# Patient Record
Sex: FEMALE | Race: WHITE | NOT HISPANIC OR LATINO | Employment: OTHER | URBAN - METROPOLITAN AREA
[De-identification: names, ages, dates, MRNs, and addresses within clinical notes are randomized per-mention and may not be internally consistent; named-entity substitution may affect disease eponyms.]

---

## 2017-04-21 ENCOUNTER — OUTSIDE FACILITY SERVICE (OUTPATIENT)
Dept: FAMILY MEDICINE CLINIC | Facility: CLINIC | Age: 82
End: 2017-04-21

## 2017-04-21 PROCEDURE — 99212 OFFICE O/P EST SF 10 MIN: CPT | Performed by: NURSE PRACTITIONER

## 2017-04-21 RX ORDER — DONEPEZIL HYDROCHLORIDE 10 MG/1
10 TABLET, FILM COATED ORAL DAILY
Qty: 30 TABLET | Refills: 11 | Status: SHIPPED | OUTPATIENT
Start: 2017-04-21 | End: 2017-10-07 | Stop reason: SDUPTHER

## 2017-04-29 ENCOUNTER — HOSPITAL ENCOUNTER (INPATIENT)
Facility: HOSPITAL | Age: 82
LOS: 6 days | Discharge: NURSING, REHAB OR LONG-TERM CARE | End: 2017-05-05
Attending: EMERGENCY MEDICINE | Admitting: INTERNAL MEDICINE

## 2017-04-29 ENCOUNTER — APPOINTMENT (OUTPATIENT)
Dept: GENERAL RADIOLOGY | Facility: HOSPITAL | Age: 82
End: 2017-04-29

## 2017-04-29 DIAGNOSIS — I50.21 ACUTE SYSTOLIC CONGESTIVE HEART FAILURE (HCC): ICD-10-CM

## 2017-04-29 DIAGNOSIS — R53.1 GENERALIZED WEAKNESS: ICD-10-CM

## 2017-04-29 DIAGNOSIS — R00.0 SINUS TACHYCARDIA: ICD-10-CM

## 2017-04-29 DIAGNOSIS — R06.09 DYSPNEA ON EXERTION: Primary | ICD-10-CM

## 2017-04-29 LAB
ALBUMIN SERPL-MCNC: 4 G/DL (ref 3.5–5.2)
ALBUMIN/GLOB SERPL: 1.1 G/DL
ALP SERPL-CCNC: 67 U/L (ref 39–117)
ALT SERPL W P-5'-P-CCNC: 37 U/L (ref 1–33)
ANION GAP SERPL CALCULATED.3IONS-SCNC: 17.8 MMOL/L
ANION GAP SERPL CALCULATED.3IONS-SCNC: 18.8 MMOL/L
ARTERIAL PATENCY WRIST A: ABNORMAL
AST SERPL-CCNC: 26 U/L (ref 1–32)
ATMOSPHERIC PRESS: 748.8 MMHG
BACTERIA UR QL AUTO: ABNORMAL /HPF
BASE EXCESS BLDA CALC-SCNC: -3.4 MMOL/L (ref 0–2)
BASOPHILS # BLD AUTO: 0.02 10*3/MM3 (ref 0–0.2)
BASOPHILS # BLD AUTO: 0.03 10*3/MM3 (ref 0–0.2)
BASOPHILS NFR BLD AUTO: 0.2 % (ref 0–1.5)
BASOPHILS NFR BLD AUTO: 0.3 % (ref 0–1.5)
BDY SITE: ABNORMAL
BILIRUB SERPL-MCNC: 1.1 MG/DL (ref 0.1–1.2)
BILIRUB UR QL STRIP: NEGATIVE
BUN BLD-MCNC: 25 MG/DL (ref 8–23)
BUN BLD-MCNC: 25 MG/DL (ref 8–23)
BUN/CREAT SERPL: 26.6 (ref 7–25)
BUN/CREAT SERPL: 26.9 (ref 7–25)
CALCIUM SPEC-SCNC: 9 MG/DL (ref 8.2–9.6)
CALCIUM SPEC-SCNC: 9.3 MG/DL (ref 8.2–9.6)
CHLORIDE SERPL-SCNC: 100 MMOL/L (ref 98–107)
CHLORIDE SERPL-SCNC: 98 MMOL/L (ref 98–107)
CLARITY UR: ABNORMAL
CO2 SERPL-SCNC: 22.2 MMOL/L (ref 22–29)
CO2 SERPL-SCNC: 23.2 MMOL/L (ref 22–29)
COLOR UR: YELLOW
CREAT BLD-MCNC: 0.93 MG/DL (ref 0.57–1)
CREAT BLD-MCNC: 0.94 MG/DL (ref 0.57–1)
D DIMER PPP FEU-MCNC: 0.94 MCGFEU/ML (ref 0–0.49)
DEPRECATED RDW RBC AUTO: 48.4 FL (ref 37–54)
DEPRECATED RDW RBC AUTO: 48.4 FL (ref 37–54)
EOSINOPHIL # BLD AUTO: 0.02 10*3/MM3 (ref 0–0.7)
EOSINOPHIL # BLD AUTO: 0.05 10*3/MM3 (ref 0–0.7)
EOSINOPHIL NFR BLD AUTO: 0.2 % (ref 0.3–6.2)
EOSINOPHIL NFR BLD AUTO: 0.5 % (ref 0.3–6.2)
ERYTHROCYTE [DISTWIDTH] IN BLOOD BY AUTOMATED COUNT: 15.1 % (ref 11.7–13)
ERYTHROCYTE [DISTWIDTH] IN BLOOD BY AUTOMATED COUNT: 15.1 % (ref 11.7–13)
GFR SERPL CREATININE-BSD FRML MDRD: 56 ML/MIN/1.73
GFR SERPL CREATININE-BSD FRML MDRD: 57 ML/MIN/1.73
GLOBULIN UR ELPH-MCNC: 3.5 GM/DL
GLUCOSE BLD-MCNC: 137 MG/DL (ref 65–99)
GLUCOSE BLD-MCNC: 144 MG/DL (ref 65–99)
GLUCOSE BLDC GLUCOMTR-MCNC: 168 MG/DL (ref 70–130)
GLUCOSE UR STRIP-MCNC: NEGATIVE MG/DL
HCO3 BLDA-SCNC: 20.4 MMOL/L (ref 22–28)
HCT VFR BLD AUTO: 37.9 % (ref 35.6–45.5)
HCT VFR BLD AUTO: 39.8 % (ref 35.6–45.5)
HGB BLD-MCNC: 12.4 G/DL (ref 11.9–15.5)
HGB BLD-MCNC: 13 G/DL (ref 11.9–15.5)
HGB UR QL STRIP.AUTO: ABNORMAL
HYALINE CASTS UR QL AUTO: ABNORMAL /LPF
IMM GRANULOCYTES # BLD: 0.03 10*3/MM3 (ref 0–0.03)
IMM GRANULOCYTES # BLD: 0.04 10*3/MM3 (ref 0–0.03)
IMM GRANULOCYTES NFR BLD: 0.3 % (ref 0–0.5)
IMM GRANULOCYTES NFR BLD: 0.4 % (ref 0–0.5)
INR PPP: 1.23 (ref 0.9–1.1)
KETONES UR QL STRIP: NEGATIVE
LEUKOCYTE ESTERASE UR QL STRIP.AUTO: ABNORMAL
LYMPHOCYTES # BLD AUTO: 1.24 10*3/MM3 (ref 0.9–4.8)
LYMPHOCYTES # BLD AUTO: 1.4 10*3/MM3 (ref 0.9–4.8)
LYMPHOCYTES NFR BLD AUTO: 11.6 % (ref 19.6–45.3)
LYMPHOCYTES NFR BLD AUTO: 13.8 % (ref 19.6–45.3)
MAGNESIUM SERPL-MCNC: 2.2 MG/DL (ref 1.7–2.3)
MCH RBC QN AUTO: 28.9 PG (ref 26.9–32)
MCH RBC QN AUTO: 29.1 PG (ref 26.9–32)
MCHC RBC AUTO-ENTMCNC: 32.7 G/DL (ref 32.4–36.3)
MCHC RBC AUTO-ENTMCNC: 32.7 G/DL (ref 32.4–36.3)
MCV RBC AUTO: 88.3 FL (ref 80.5–98.2)
MCV RBC AUTO: 89 FL (ref 80.5–98.2)
MODALITY: ABNORMAL
MONOCYTES # BLD AUTO: 0.76 10*3/MM3 (ref 0.2–1.2)
MONOCYTES # BLD AUTO: 0.85 10*3/MM3 (ref 0.2–1.2)
MONOCYTES NFR BLD AUTO: 7.1 % (ref 5–12)
MONOCYTES NFR BLD AUTO: 8.4 % (ref 5–12)
NEUTROPHILS # BLD AUTO: 7.75 10*3/MM3 (ref 1.9–8.1)
NEUTROPHILS # BLD AUTO: 8.65 10*3/MM3 (ref 1.9–8.1)
NEUTROPHILS NFR BLD AUTO: 76.7 % (ref 42.7–76)
NEUTROPHILS NFR BLD AUTO: 80.5 % (ref 42.7–76)
NITRITE UR QL STRIP: NEGATIVE
NT-PROBNP SERPL-MCNC: 8177 PG/ML (ref 0–1800)
PCO2 BLDA: 32.3 MM HG (ref 35–45)
PH BLDA: 7.41 PH UNITS (ref 7.35–7.45)
PH UR STRIP.AUTO: 6 [PH] (ref 5–8)
PLATELET # BLD AUTO: 280 10*3/MM3 (ref 140–500)
PLATELET # BLD AUTO: 296 10*3/MM3 (ref 140–500)
PMV BLD AUTO: 9.7 FL (ref 6–12)
PMV BLD AUTO: 9.8 FL (ref 6–12)
PO2 BLDA: 67.8 MM HG (ref 80–100)
POTASSIUM BLD-SCNC: 3.8 MMOL/L (ref 3.5–5.2)
POTASSIUM BLD-SCNC: 4.1 MMOL/L (ref 3.5–5.2)
PROT SERPL-MCNC: 7.5 G/DL (ref 6–8.5)
PROT UR QL STRIP: NEGATIVE
PROTHROMBIN TIME: 15 SECONDS (ref 11.7–14.2)
RBC # BLD AUTO: 4.29 10*6/MM3 (ref 3.9–5.2)
RBC # BLD AUTO: 4.47 10*6/MM3 (ref 3.9–5.2)
RBC # UR: ABNORMAL /HPF
REF LAB TEST METHOD: ABNORMAL
SAO2 % BLDCOA: 93.6 % (ref 92–99)
SODIUM BLD-SCNC: 138 MMOL/L (ref 136–145)
SODIUM BLD-SCNC: 142 MMOL/L (ref 136–145)
SP GR UR STRIP: 1.01 (ref 1–1.03)
SQUAMOUS #/AREA URNS HPF: ABNORMAL /HPF
T4 FREE SERPL-MCNC: 1.35 NG/DL (ref 0.93–1.7)
TOTAL RATE: 22 BREATHS/MINUTE
TROPONIN T SERPL-MCNC: <0.01 NG/ML (ref 0–0.03)
TSH SERPL DL<=0.05 MIU/L-ACNC: 3.76 MIU/ML (ref 0.27–4.2)
UROBILINOGEN UR QL STRIP: ABNORMAL
WBC NRBC COR # BLD: 10.11 10*3/MM3 (ref 4.5–10.7)
WBC NRBC COR # BLD: 10.73 10*3/MM3 (ref 4.5–10.7)
WBC UR QL AUTO: ABNORMAL /HPF

## 2017-04-29 PROCEDURE — 94799 UNLISTED PULMONARY SVC/PX: CPT

## 2017-04-29 PROCEDURE — 85025 COMPLETE CBC W/AUTO DIFF WBC: CPT | Performed by: EMERGENCY MEDICINE

## 2017-04-29 PROCEDURE — 94660 CPAP INITIATION&MGMT: CPT

## 2017-04-29 PROCEDURE — 99222 1ST HOSP IP/OBS MODERATE 55: CPT | Performed by: INTERNAL MEDICINE

## 2017-04-29 PROCEDURE — 84484 ASSAY OF TROPONIN QUANT: CPT | Performed by: EMERGENCY MEDICINE

## 2017-04-29 PROCEDURE — 84443 ASSAY THYROID STIM HORMONE: CPT | Performed by: EMERGENCY MEDICINE

## 2017-04-29 PROCEDURE — 80053 COMPREHEN METABOLIC PANEL: CPT | Performed by: EMERGENCY MEDICINE

## 2017-04-29 PROCEDURE — 93005 ELECTROCARDIOGRAM TRACING: CPT | Performed by: INTERNAL MEDICINE

## 2017-04-29 PROCEDURE — 82962 GLUCOSE BLOOD TEST: CPT

## 2017-04-29 PROCEDURE — 5A09357 ASSISTANCE WITH RESPIRATORY VENTILATION, LESS THAN 24 CONSECUTIVE HOURS, CONTINUOUS POSITIVE AIRWAY PRESSURE: ICD-10-PCS | Performed by: INTERNAL MEDICINE

## 2017-04-29 PROCEDURE — 93005 ELECTROCARDIOGRAM TRACING: CPT | Performed by: EMERGENCY MEDICINE

## 2017-04-29 PROCEDURE — 93010 ELECTROCARDIOGRAM REPORT: CPT | Performed by: INTERNAL MEDICINE

## 2017-04-29 PROCEDURE — 71010 HC CHEST PA OR AP: CPT

## 2017-04-29 PROCEDURE — 36600 WITHDRAWAL OF ARTERIAL BLOOD: CPT

## 2017-04-29 PROCEDURE — 85610 PROTHROMBIN TIME: CPT | Performed by: EMERGENCY MEDICINE

## 2017-04-29 PROCEDURE — 25010000002 FUROSEMIDE PER 20 MG: Performed by: EMERGENCY MEDICINE

## 2017-04-29 PROCEDURE — 81001 URINALYSIS AUTO W/SCOPE: CPT | Performed by: INTERNAL MEDICINE

## 2017-04-29 PROCEDURE — 83880 ASSAY OF NATRIURETIC PEPTIDE: CPT | Performed by: EMERGENCY MEDICINE

## 2017-04-29 PROCEDURE — 85379 FIBRIN DEGRADATION QUANT: CPT | Performed by: EMERGENCY MEDICINE

## 2017-04-29 PROCEDURE — 85025 COMPLETE CBC W/AUTO DIFF WBC: CPT | Performed by: INTERNAL MEDICINE

## 2017-04-29 PROCEDURE — 99284 EMERGENCY DEPT VISIT MOD MDM: CPT

## 2017-04-29 PROCEDURE — 82803 BLOOD GASES ANY COMBINATION: CPT

## 2017-04-29 PROCEDURE — 84439 ASSAY OF FREE THYROXINE: CPT | Performed by: EMERGENCY MEDICINE

## 2017-04-29 PROCEDURE — 83735 ASSAY OF MAGNESIUM: CPT | Performed by: EMERGENCY MEDICINE

## 2017-04-29 RX ORDER — SODIUM CHLORIDE 0.9 % (FLUSH) 0.9 %
10 SYRINGE (ML) INJECTION AS NEEDED
Status: DISCONTINUED | OUTPATIENT
Start: 2017-04-29 | End: 2017-05-05 | Stop reason: HOSPADM

## 2017-04-29 RX ORDER — ACETAMINOPHEN 650 MG/1
650 SUPPOSITORY RECTAL EVERY 4 HOURS PRN
Status: DISCONTINUED | OUTPATIENT
Start: 2017-04-29 | End: 2017-05-05 | Stop reason: HOSPADM

## 2017-04-29 RX ORDER — IPRATROPIUM BROMIDE AND ALBUTEROL SULFATE 2.5; .5 MG/3ML; MG/3ML
3 SOLUTION RESPIRATORY (INHALATION) EVERY 6 HOURS PRN
Status: DISCONTINUED | OUTPATIENT
Start: 2017-04-29 | End: 2017-05-03

## 2017-04-29 RX ORDER — FUROSEMIDE 10 MG/ML
80 INJECTION INTRAMUSCULAR; INTRAVENOUS ONCE
Status: COMPLETED | OUTPATIENT
Start: 2017-04-29 | End: 2017-04-29

## 2017-04-29 RX ORDER — BISACODYL 10 MG
10 SUPPOSITORY, RECTAL RECTAL DAILY PRN
Status: DISCONTINUED | OUTPATIENT
Start: 2017-04-29 | End: 2017-05-05 | Stop reason: HOSPADM

## 2017-04-29 RX ORDER — ACETAMINOPHEN 325 MG/1
650 TABLET ORAL EVERY 4 HOURS PRN
Status: DISCONTINUED | OUTPATIENT
Start: 2017-04-29 | End: 2017-05-05 | Stop reason: HOSPADM

## 2017-04-29 RX ORDER — METOPROLOL SUCCINATE 100 MG/1
100 TABLET, EXTENDED RELEASE ORAL DAILY
COMMUNITY
End: 2017-05-05 | Stop reason: HOSPADM

## 2017-04-29 RX ORDER — NITROGLYCERIN 0.4 MG/1
0.4 TABLET SUBLINGUAL
Status: DISCONTINUED | OUTPATIENT
Start: 2017-04-29 | End: 2017-05-05 | Stop reason: HOSPADM

## 2017-04-29 RX ORDER — DONEPEZIL HYDROCHLORIDE 10 MG/1
10 TABLET, FILM COATED ORAL DAILY
Status: DISCONTINUED | OUTPATIENT
Start: 2017-04-30 | End: 2017-05-05 | Stop reason: HOSPADM

## 2017-04-29 RX ORDER — ENALAPRIL MALEATE 20 MG/1
20 TABLET ORAL
Status: DISCONTINUED | OUTPATIENT
Start: 2017-04-30 | End: 2017-05-05 | Stop reason: HOSPADM

## 2017-04-29 RX ORDER — ASPIRIN 81 MG/1
81 TABLET ORAL DAILY
Status: DISCONTINUED | OUTPATIENT
Start: 2017-04-30 | End: 2017-05-05 | Stop reason: HOSPADM

## 2017-04-29 RX ORDER — BISACODYL 5 MG/1
5 TABLET, DELAYED RELEASE ORAL DAILY PRN
Status: DISCONTINUED | OUTPATIENT
Start: 2017-04-29 | End: 2017-05-05 | Stop reason: HOSPADM

## 2017-04-29 RX ORDER — SPIRONOLACTONE 25 MG/1
25 TABLET ORAL DAILY
Status: DISCONTINUED | OUTPATIENT
Start: 2017-04-30 | End: 2017-05-05 | Stop reason: HOSPADM

## 2017-04-29 RX ORDER — DOCUSATE SODIUM 100 MG/1
100 CAPSULE, LIQUID FILLED ORAL 2 TIMES DAILY
Status: DISCONTINUED | OUTPATIENT
Start: 2017-04-29 | End: 2017-05-05 | Stop reason: HOSPADM

## 2017-04-29 RX ORDER — PROMETHAZINE HYDROCHLORIDE 25 MG/1
12.5 TABLET ORAL EVERY 6 HOURS PRN
Status: DISCONTINUED | OUTPATIENT
Start: 2017-04-29 | End: 2017-05-05 | Stop reason: HOSPADM

## 2017-04-29 RX ORDER — HYDROCHLOROTHIAZIDE 12.5 MG/1
12.5 CAPSULE, GELATIN COATED ORAL DAILY
Status: DISCONTINUED | OUTPATIENT
Start: 2017-04-30 | End: 2017-04-30

## 2017-04-29 RX ORDER — SODIUM CHLORIDE 0.9 % (FLUSH) 0.9 %
1-10 SYRINGE (ML) INJECTION AS NEEDED
Status: DISCONTINUED | OUTPATIENT
Start: 2017-04-29 | End: 2017-05-05 | Stop reason: HOSPADM

## 2017-04-29 RX ORDER — METOPROLOL SUCCINATE 100 MG/1
100 TABLET, EXTENDED RELEASE ORAL DAILY
Status: DISCONTINUED | OUTPATIENT
Start: 2017-04-30 | End: 2017-05-02

## 2017-04-29 RX ORDER — MAGNESIUM HYDROXIDE/ALUMINUM HYDROXICE/SIMETHICONE 120; 1200; 1200 MG/30ML; MG/30ML; MG/30ML
30 SUSPENSION ORAL EVERY 6 HOURS PRN
Status: DISCONTINUED | OUTPATIENT
Start: 2017-04-29 | End: 2017-05-05 | Stop reason: HOSPADM

## 2017-04-29 RX ORDER — ONDANSETRON 4 MG/1
4 TABLET, ORALLY DISINTEGRATING ORAL EVERY 6 HOURS PRN
Status: DISCONTINUED | OUTPATIENT
Start: 2017-04-29 | End: 2017-05-05 | Stop reason: HOSPADM

## 2017-04-29 RX ORDER — ONDANSETRON 4 MG/1
4 TABLET, FILM COATED ORAL EVERY 6 HOURS PRN
Status: DISCONTINUED | OUTPATIENT
Start: 2017-04-29 | End: 2017-05-05 | Stop reason: HOSPADM

## 2017-04-29 RX ORDER — CALCIUM CARBONATE 200(500)MG
2 TABLET,CHEWABLE ORAL 2 TIMES DAILY PRN
Status: DISCONTINUED | OUTPATIENT
Start: 2017-04-29 | End: 2017-05-05 | Stop reason: HOSPADM

## 2017-04-29 RX ORDER — ONDANSETRON 2 MG/ML
4 INJECTION INTRAMUSCULAR; INTRAVENOUS EVERY 6 HOURS PRN
Status: DISCONTINUED | OUTPATIENT
Start: 2017-04-29 | End: 2017-05-05 | Stop reason: HOSPADM

## 2017-04-29 RX ORDER — HYDROCODONE BITARTRATE AND ACETAMINOPHEN 5; 325 MG/1; MG/1
1 TABLET ORAL EVERY 4 HOURS PRN
Status: DISCONTINUED | OUTPATIENT
Start: 2017-04-29 | End: 2017-05-05 | Stop reason: HOSPADM

## 2017-04-29 RX ORDER — FUROSEMIDE 10 MG/ML
40 INJECTION INTRAMUSCULAR; INTRAVENOUS EVERY 12 HOURS
Status: DISCONTINUED | OUTPATIENT
Start: 2017-04-29 | End: 2017-04-30

## 2017-04-29 RX ADMIN — FUROSEMIDE 80 MG: 10 INJECTION, SOLUTION INTRAMUSCULAR; INTRAVENOUS at 16:31

## 2017-04-29 RX ADMIN — IVABRADINE 7.5 MG: 5 TABLET, FILM COATED ORAL at 22:48

## 2017-04-29 RX ADMIN — METOROPROLOL TARTRATE 5 MG: 5 INJECTION, SOLUTION INTRAVENOUS at 15:51

## 2017-04-29 RX ADMIN — NITROGLYCERIN 0.5 INCH: 20 OINTMENT TOPICAL at 16:55

## 2017-04-30 ENCOUNTER — APPOINTMENT (OUTPATIENT)
Dept: GENERAL RADIOLOGY | Facility: HOSPITAL | Age: 82
End: 2017-04-30

## 2017-04-30 ENCOUNTER — APPOINTMENT (OUTPATIENT)
Dept: CARDIOLOGY | Facility: HOSPITAL | Age: 82
End: 2017-04-30
Attending: INTERNAL MEDICINE

## 2017-04-30 PROBLEM — J81.0 ACUTE PULMONARY EDEMA (HCC): Status: ACTIVE | Noted: 2017-04-30

## 2017-04-30 PROBLEM — K62.9 RECTAL DISORDER: Status: ACTIVE | Noted: 2017-04-30

## 2017-04-30 PROBLEM — I42.7 DILATED CARDIOMYOPATHY SECONDARY TO DRUG (HCC): Status: ACTIVE | Noted: 2017-04-30

## 2017-04-30 PROBLEM — R41.3 MEMORY LOSS: Status: ACTIVE | Noted: 2017-04-30

## 2017-04-30 PROBLEM — C83.50: Status: ACTIVE | Noted: 2017-04-30

## 2017-04-30 PROBLEM — R60.0 PEDAL EDEMA: Status: ACTIVE | Noted: 2017-04-30

## 2017-04-30 PROBLEM — M48.54XA COLLAPSED VERTEBRA, NOT ELSEWHERE CLASSIFIED, THORACIC REGION, INITIAL ENCOUNTER FOR FRACTURE (HCC): Status: ACTIVE | Noted: 2017-04-30

## 2017-04-30 PROBLEM — J90 PLEURAL EFFUSION ON RIGHT: Status: ACTIVE | Noted: 2017-04-30

## 2017-04-30 PROBLEM — G47.33 OBSTRUCTIVE SLEEP APNEA SYNDROME: Status: ACTIVE | Noted: 2017-04-30

## 2017-04-30 PROBLEM — I47.1 PAROXYSMAL SUPRAVENTRICULAR TACHYCARDIA (HCC): Status: ACTIVE | Noted: 2017-04-30

## 2017-04-30 PROBLEM — I50.21 ACUTE SYSTOLIC (CONGESTIVE) HEART FAILURE (HCC): Status: ACTIVE | Noted: 2017-04-30

## 2017-04-30 PROBLEM — I10 ESSENTIAL HYPERTENSION: Status: ACTIVE | Noted: 2017-04-30

## 2017-04-30 PROBLEM — K57.90 DIVERTICULOSIS OF INTESTINE: Status: ACTIVE | Noted: 2017-04-30

## 2017-04-30 PROBLEM — I34.0 MITRAL VALVE INSUFFICIENCY: Status: ACTIVE | Noted: 2017-04-30

## 2017-04-30 PROBLEM — M81.0 OSTEOPOROSIS: Status: ACTIVE | Noted: 2017-04-30

## 2017-04-30 PROBLEM — I51.89 DIASTOLIC DYSFUNCTION: Status: ACTIVE | Noted: 2017-04-30

## 2017-04-30 PROBLEM — I50.42 CHRONIC COMBINED SYSTOLIC AND DIASTOLIC HEART FAILURE (HCC): Status: ACTIVE | Noted: 2017-04-30

## 2017-04-30 PROBLEM — J96.01 ACUTE RESPIRATORY FAILURE WITH HYPOXEMIA (HCC): Status: ACTIVE | Noted: 2017-04-30

## 2017-04-30 PROBLEM — D25.9 UTERINE LEIOMYOMA: Status: ACTIVE | Noted: 2017-04-30

## 2017-04-30 LAB
AMYLASE SERPL-CCNC: 15 U/L (ref 28–100)
ANION GAP SERPL CALCULATED.3IONS-SCNC: 19.6 MMOL/L
AORTIC ARCH: 1.9 CM
ASCENDING AORTA: 2.7 CM
BASOPHILS # BLD AUTO: 0.04 10*3/MM3 (ref 0–0.2)
BASOPHILS NFR BLD AUTO: 0.4 % (ref 0–1.5)
BH CV ECHO MEAS - ACS: 0.8 CM
BH CV ECHO MEAS - AI DEC SLOPE: 183 CM/SEC^2
BH CV ECHO MEAS - AI MAX PG: 10.6 MMHG
BH CV ECHO MEAS - AI MAX VEL: 163 CM/SEC
BH CV ECHO MEAS - AI P1/2T: 260.9 MSEC
BH CV ECHO MEAS - AO MAX PG (FULL): 4 MMHG
BH CV ECHO MEAS - AO MAX PG: 6.6 MMHG
BH CV ECHO MEAS - AO MEAN PG (FULL): 3 MMHG
BH CV ECHO MEAS - AO MEAN PG: 4 MMHG
BH CV ECHO MEAS - AO ROOT AREA (BSA CORRECTED): 1.9
BH CV ECHO MEAS - AO ROOT AREA: 7.1 CM^2
BH CV ECHO MEAS - AO ROOT DIAM: 3 CM
BH CV ECHO MEAS - AO V2 MAX: 128 CM/SEC
BH CV ECHO MEAS - AO V2 MEAN: 93 CM/SEC
BH CV ECHO MEAS - AO V2 VTI: 22 CM
BH CV ECHO MEAS - ASC AORTA: 2.7 CM
BH CV ECHO MEAS - AVA(I,A): 1.5 CM^2
BH CV ECHO MEAS - AVA(I,D): 1.5 CM^2
BH CV ECHO MEAS - AVA(V,A): 1.6 CM^2
BH CV ECHO MEAS - AVA(V,D): 1.6 CM^2
BH CV ECHO MEAS - BSA(HAYCOCK): 1.6 M^2
BH CV ECHO MEAS - BSA: 1.6 M^2
BH CV ECHO MEAS - BZI_BMI: 25.5 KILOGRAMS/M^2
BH CV ECHO MEAS - BZI_METRIC_HEIGHT: 154.9 CM
BH CV ECHO MEAS - BZI_METRIC_WEIGHT: 61.2 KG
BH CV ECHO MEAS - CONTRAST EF (2CH): 22.4 ML/M^2
BH CV ECHO MEAS - CONTRAST EF 4CH: 27.2 ML/M^2
BH CV ECHO MEAS - EDV(CUBED): 132.7 ML
BH CV ECHO MEAS - EDV(MOD-SP2): 116 ML
BH CV ECHO MEAS - EDV(MOD-SP4): 136 ML
BH CV ECHO MEAS - EDV(TEICH): 123.8 ML
BH CV ECHO MEAS - EF(CUBED): 31.3 %
BH CV ECHO MEAS - EF(MOD-SP2): 22.4 %
BH CV ECHO MEAS - EF(MOD-SP4): 27.2 %
BH CV ECHO MEAS - EF(TEICH): 25.3 %
BH CV ECHO MEAS - ESV(CUBED): 91.1 ML
BH CV ECHO MEAS - ESV(MOD-SP2): 90 ML
BH CV ECHO MEAS - ESV(MOD-SP4): 99 ML
BH CV ECHO MEAS - ESV(TEICH): 92.4 ML
BH CV ECHO MEAS - FS: 11.8 %
BH CV ECHO MEAS - IVS/LVPW: 0.6
BH CV ECHO MEAS - IVSD: 0.6 CM
BH CV ECHO MEAS - LAT PEAK E' VEL: 10 CM/SEC
BH CV ECHO MEAS - LV DIASTOLIC VOL/BSA (35-75): 85.1 ML/M^2
BH CV ECHO MEAS - LV MASS(C)D: 140.5 GRAMS
BH CV ECHO MEAS - LV MASS(C)DI: 87.9 GRAMS/M^2
BH CV ECHO MEAS - LV MAX PG: 2.5 MMHG
BH CV ECHO MEAS - LV MEAN PG: 1 MMHG
BH CV ECHO MEAS - LV SYSTOLIC VOL/BSA (12-30): 61.9 ML/M^2
BH CV ECHO MEAS - LV V1 MAX: 79.8 CM/SEC
BH CV ECHO MEAS - LV V1 MEAN: 51.1 CM/SEC
BH CV ECHO MEAS - LV V1 VTI: 13.2 CM
BH CV ECHO MEAS - LVIDD: 5.1 CM
BH CV ECHO MEAS - LVIDS: 4.5 CM
BH CV ECHO MEAS - LVLD AP2: 6.6 CM
BH CV ECHO MEAS - LVLD AP4: 8.3 CM
BH CV ECHO MEAS - LVLS AP2: 6.4 CM
BH CV ECHO MEAS - LVLS AP4: 7.5 CM
BH CV ECHO MEAS - LVOT AREA (M): 2.5 CM^2
BH CV ECHO MEAS - LVOT AREA: 2.5 CM^2
BH CV ECHO MEAS - LVOT DIAM: 1.8 CM
BH CV ECHO MEAS - LVPWD: 1 CM
BH CV ECHO MEAS - MED PEAK E' VEL: 6 CM/SEC
BH CV ECHO MEAS - MR MAX PG: 84.3 MMHG
BH CV ECHO MEAS - MR MAX VEL: 459 CM/SEC
BH CV ECHO MEAS - MV DEC SLOPE: 1187 CM/SEC^2
BH CV ECHO MEAS - MV DEC TIME: 0.13 SEC
BH CV ECHO MEAS - MV E MAX VEL: 134 CM/SEC
BH CV ECHO MEAS - MV MAX PG: 9.2 MMHG
BH CV ECHO MEAS - MV MEAN PG: 3 MMHG
BH CV ECHO MEAS - MV P1/2T MAX VEL: 161 CM/SEC
BH CV ECHO MEAS - MV P1/2T: 39.7 MSEC
BH CV ECHO MEAS - MV V2 MAX: 152 CM/SEC
BH CV ECHO MEAS - MV V2 MEAN: 74.1 CM/SEC
BH CV ECHO MEAS - MV V2 VTI: 19.5 CM
BH CV ECHO MEAS - MVA P1/2T LCG: 1.4 CM^2
BH CV ECHO MEAS - MVA(P1/2T): 5.5 CM^2
BH CV ECHO MEAS - MVA(VTI): 1.7 CM^2
BH CV ECHO MEAS - PA ACC TIME: 0.05 SEC
BH CV ECHO MEAS - PA MAX PG (FULL): 0.95 MMHG
BH CV ECHO MEAS - PA MAX PG: 1.8 MMHG
BH CV ECHO MEAS - PA PR(ACCEL): 55.2 MMHG
BH CV ECHO MEAS - PA V2 MAX: 67.9 CM/SEC
BH CV ECHO MEAS - PI END-D VEL: 82.8 CM/SEC
BH CV ECHO MEAS - PULM DIAS VEL: 74.7 CM/SEC
BH CV ECHO MEAS - PULM S/D: 0.61
BH CV ECHO MEAS - PULM SYS VEL: 45.6 CM/SEC
BH CV ECHO MEAS - PVA(V,A): 3.7 CM^2
BH CV ECHO MEAS - PVA(V,D): 3.7 CM^2
BH CV ECHO MEAS - QP/QS: 0.98
BH CV ECHO MEAS - RAP SYSTOLE: 15 MMHG
BH CV ECHO MEAS - RV MAX PG: 0.89 MMHG
BH CV ECHO MEAS - RV MEAN PG: 1 MMHG
BH CV ECHO MEAS - RV V1 MAX: 47.3 CM/SEC
BH CV ECHO MEAS - RV V1 MEAN: 33.6 CM/SEC
BH CV ECHO MEAS - RV V1 VTI: 6.2 CM
BH CV ECHO MEAS - RVOT AREA: 5.3 CM^2
BH CV ECHO MEAS - RVOT DIAM: 2.6 CM
BH CV ECHO MEAS - RVSP: 43 MMHG
BH CV ECHO MEAS - SI(AO): 97.3 ML/M^2
BH CV ECHO MEAS - SI(CUBED): 26 ML/M^2
BH CV ECHO MEAS - SI(LVOT): 21 ML/M^2
BH CV ECHO MEAS - SI(MOD-SP2): 16.3 ML/M^2
BH CV ECHO MEAS - SI(MOD-SP4): 23.1 ML/M^2
BH CV ECHO MEAS - SI(TEICH): 19.6 ML/M^2
BH CV ECHO MEAS - SUP REN AO DIAM: 1.5 CM
BH CV ECHO MEAS - SV(AO): 155.5 ML
BH CV ECHO MEAS - SV(CUBED): 41.5 ML
BH CV ECHO MEAS - SV(LVOT): 33.6 ML
BH CV ECHO MEAS - SV(MOD-SP2): 26 ML
BH CV ECHO MEAS - SV(MOD-SP4): 37 ML
BH CV ECHO MEAS - SV(RVOT): 33 ML
BH CV ECHO MEAS - SV(TEICH): 31.4 ML
BH CV ECHO MEAS - TAPSE (>1.6): 1.5 CM2
BH CV ECHO MEAS - TR MAX VEL: 266 CM/SEC
BH CV XLRA - RV BASE: 3.5 CM
BH CV XLRA - TDI S': 11 CM/SEC
BUN BLD-MCNC: 28 MG/DL (ref 8–23)
BUN/CREAT SERPL: 29.5 (ref 7–25)
CALCIUM SPEC-SCNC: 8.9 MG/DL (ref 8.2–9.6)
CHLORIDE SERPL-SCNC: 101 MMOL/L (ref 98–107)
CHOLEST SERPL-MCNC: 155 MG/DL (ref 0–200)
CK MB SERPL-CCNC: 3.9 NG/ML
CK SERPL-CCNC: 57 U/L (ref 20–180)
CO2 SERPL-SCNC: 21.4 MMOL/L (ref 22–29)
CREAT BLD-MCNC: 0.95 MG/DL (ref 0.57–1)
D DIMER PPP FEU-MCNC: 0.99 MCGFEU/ML (ref 0–0.49)
DEPRECATED RDW RBC AUTO: 48.1 FL (ref 37–54)
E/E' RATIO: 19
EOSINOPHIL # BLD AUTO: 0.1 10*3/MM3 (ref 0–0.7)
EOSINOPHIL NFR BLD AUTO: 0.9 % (ref 0.3–6.2)
ERYTHROCYTE [DISTWIDTH] IN BLOOD BY AUTOMATED COUNT: 15.1 % (ref 11.7–13)
GFR SERPL CREATININE-BSD FRML MDRD: 55 ML/MIN/1.73
GLUCOSE BLD-MCNC: 125 MG/DL (ref 65–99)
GLUCOSE BLDC GLUCOMTR-MCNC: 124 MG/DL (ref 70–130)
GLUCOSE BLDC GLUCOMTR-MCNC: 164 MG/DL (ref 70–130)
GLUCOSE BLDC GLUCOMTR-MCNC: 177 MG/DL (ref 70–130)
GLUCOSE BLDC GLUCOMTR-MCNC: 184 MG/DL (ref 70–130)
HCT VFR BLD AUTO: 37.8 % (ref 35.6–45.5)
HDLC SERPL-MCNC: 22 MG/DL (ref 40–60)
HGB BLD-MCNC: 12.2 G/DL (ref 11.9–15.5)
IMM GRANULOCYTES # BLD: 0.03 10*3/MM3 (ref 0–0.03)
IMM GRANULOCYTES NFR BLD: 0.3 % (ref 0–0.5)
LDLC SERPL CALC-MCNC: 106 MG/DL (ref 0–100)
LDLC/HDLC SERPL: 4.83 {RATIO}
LEFT ATRIUM VOLUME INDEX: 41 ML/M2
LIPASE SERPL-CCNC: 30 U/L (ref 13–60)
LV EF 2D ECHO EST: 22 %
LYMPHOCYTES # BLD AUTO: 1.79 10*3/MM3 (ref 0.9–4.8)
LYMPHOCYTES NFR BLD AUTO: 17 % (ref 19.6–45.3)
MAGNESIUM SERPL-MCNC: 2.1 MG/DL (ref 1.7–2.3)
MCH RBC QN AUTO: 29 PG (ref 26.9–32)
MCHC RBC AUTO-ENTMCNC: 32.3 G/DL (ref 32.4–36.3)
MCV RBC AUTO: 90 FL (ref 80.5–98.2)
MONOCYTES # BLD AUTO: 1.04 10*3/MM3 (ref 0.2–1.2)
MONOCYTES NFR BLD AUTO: 9.9 % (ref 5–12)
NEUTROPHILS # BLD AUTO: 7.54 10*3/MM3 (ref 1.9–8.1)
NEUTROPHILS NFR BLD AUTO: 71.5 % (ref 42.7–76)
NT-PROBNP SERPL-MCNC: ABNORMAL PG/ML (ref 0–1800)
PHOSPHATE SERPL-MCNC: 4.6 MG/DL (ref 2.5–4.5)
PLATELET # BLD AUTO: 253 10*3/MM3 (ref 140–500)
PMV BLD AUTO: 10.1 FL (ref 6–12)
POTASSIUM BLD-SCNC: 4 MMOL/L (ref 3.5–5.2)
RBC # BLD AUTO: 4.2 10*6/MM3 (ref 3.9–5.2)
SODIUM BLD-SCNC: 142 MMOL/L (ref 136–145)
TRIGL SERPL-MCNC: 134 MG/DL (ref 0–150)
TROPONIN T SERPL-MCNC: <0.01 NG/ML (ref 0–0.03)
TSH SERPL DL<=0.05 MIU/L-ACNC: 3.33 MIU/ML (ref 0.27–4.2)
VLDLC SERPL-MCNC: 26.8 MG/DL (ref 5–40)
WBC NRBC COR # BLD: 10.54 10*3/MM3 (ref 4.5–10.7)

## 2017-04-30 PROCEDURE — 84443 ASSAY THYROID STIM HORMONE: CPT | Performed by: INTERNAL MEDICINE

## 2017-04-30 PROCEDURE — 93010 ELECTROCARDIOGRAM REPORT: CPT | Performed by: INTERNAL MEDICINE

## 2017-04-30 PROCEDURE — 80048 BASIC METABOLIC PNL TOTAL CA: CPT | Performed by: INTERNAL MEDICINE

## 2017-04-30 PROCEDURE — 80061 LIPID PANEL: CPT | Performed by: INTERNAL MEDICINE

## 2017-04-30 PROCEDURE — 94799 UNLISTED PULMONARY SVC/PX: CPT

## 2017-04-30 PROCEDURE — 82962 GLUCOSE BLOOD TEST: CPT

## 2017-04-30 PROCEDURE — 84484 ASSAY OF TROPONIN QUANT: CPT | Performed by: INTERNAL MEDICINE

## 2017-04-30 PROCEDURE — C8929 TTE W OR WO FOL WCON,DOPPLER: HCPCS

## 2017-04-30 PROCEDURE — 25010000002 ENOXAPARIN PER 10 MG: Performed by: INTERNAL MEDICINE

## 2017-04-30 PROCEDURE — 85379 FIBRIN DEGRADATION QUANT: CPT | Performed by: INTERNAL MEDICINE

## 2017-04-30 PROCEDURE — 0399T ADULT TRANSTHORACIC ECHO COMPLETE WITH CONTRAST: CPT | Performed by: INTERNAL MEDICINE

## 2017-04-30 PROCEDURE — 93306 TTE W/DOPPLER COMPLETE: CPT | Performed by: INTERNAL MEDICINE

## 2017-04-30 PROCEDURE — 93005 ELECTROCARDIOGRAM TRACING: CPT | Performed by: INTERNAL MEDICINE

## 2017-04-30 PROCEDURE — 82550 ASSAY OF CK (CPK): CPT | Performed by: INTERNAL MEDICINE

## 2017-04-30 PROCEDURE — 99233 SBSQ HOSP IP/OBS HIGH 50: CPT | Performed by: INTERNAL MEDICINE

## 2017-04-30 PROCEDURE — 99232 SBSQ HOSP IP/OBS MODERATE 35: CPT | Performed by: INTERNAL MEDICINE

## 2017-04-30 PROCEDURE — 25010000002 PERFLUTREN (DEFINITY) 8.476 MG IN SODIUM CHLORIDE 10 ML INJECTION: Performed by: INTERNAL MEDICINE

## 2017-04-30 PROCEDURE — 71010 HC CHEST PA OR AP: CPT

## 2017-04-30 PROCEDURE — 83880 ASSAY OF NATRIURETIC PEPTIDE: CPT | Performed by: INTERNAL MEDICINE

## 2017-04-30 PROCEDURE — 25010000002 FUROSEMIDE PER 20 MG: Performed by: INTERNAL MEDICINE

## 2017-04-30 PROCEDURE — 83690 ASSAY OF LIPASE: CPT | Performed by: INTERNAL MEDICINE

## 2017-04-30 PROCEDURE — 0399T HC MYOCARDL STRAIN IMAG QUAN ASSMT PER SESS: CPT

## 2017-04-30 PROCEDURE — 82150 ASSAY OF AMYLASE: CPT | Performed by: INTERNAL MEDICINE

## 2017-04-30 PROCEDURE — 84100 ASSAY OF PHOSPHORUS: CPT | Performed by: INTERNAL MEDICINE

## 2017-04-30 PROCEDURE — 85025 COMPLETE CBC W/AUTO DIFF WBC: CPT | Performed by: INTERNAL MEDICINE

## 2017-04-30 PROCEDURE — 83735 ASSAY OF MAGNESIUM: CPT | Performed by: INTERNAL MEDICINE

## 2017-04-30 PROCEDURE — 82553 CREATINE MB FRACTION: CPT | Performed by: INTERNAL MEDICINE

## 2017-04-30 RX ORDER — BUMETANIDE 0.25 MG/ML
2 INJECTION INTRAMUSCULAR; INTRAVENOUS EVERY 12 HOURS
Status: DISCONTINUED | OUTPATIENT
Start: 2017-04-30 | End: 2017-05-01

## 2017-04-30 RX ORDER — BUMETANIDE 0.25 MG/ML
2 INJECTION INTRAMUSCULAR; INTRAVENOUS EVERY 8 HOURS
Status: DISCONTINUED | OUTPATIENT
Start: 2017-04-30 | End: 2017-04-30

## 2017-04-30 RX ORDER — MUSCLE RUB CREAM 100; 150 MG/G; MG/G
CREAM TOPICAL
Status: DISCONTINUED | OUTPATIENT
Start: 2017-04-30 | End: 2017-05-05 | Stop reason: HOSPADM

## 2017-04-30 RX ADMIN — SPIRONOLACTONE 25 MG: 25 TABLET, FILM COATED ORAL at 08:27

## 2017-04-30 RX ADMIN — ASPIRIN 81 MG: 81 TABLET ORAL at 08:27

## 2017-04-30 RX ADMIN — DOCUSATE SODIUM 100 MG: 100 CAPSULE, LIQUID FILLED ORAL at 17:39

## 2017-04-30 RX ADMIN — ACETAMINOPHEN 650 MG: 325 TABLET ORAL at 01:31

## 2017-04-30 RX ADMIN — IVABRADINE 7.5 MG: 5 TABLET, FILM COATED ORAL at 08:28

## 2017-04-30 RX ADMIN — DOCUSATE SODIUM 100 MG: 100 CAPSULE, LIQUID FILLED ORAL at 01:31

## 2017-04-30 RX ADMIN — IVABRADINE 7.5 MG: 5 TABLET, FILM COATED ORAL at 17:39

## 2017-04-30 RX ADMIN — PERFLUTREN 2 ML: 6.52 INJECTION, SUSPENSION INTRAVENOUS at 11:36

## 2017-04-30 RX ADMIN — FUROSEMIDE 40 MG: 10 INJECTION, SOLUTION INTRAMUSCULAR; INTRAVENOUS at 05:25

## 2017-04-30 RX ADMIN — DOCUSATE SODIUM 100 MG: 100 CAPSULE, LIQUID FILLED ORAL at 08:27

## 2017-04-30 RX ADMIN — ENALAPRIL MALEATE 20 MG: 20 TABLET ORAL at 08:27

## 2017-04-30 RX ADMIN — DONEPEZIL HYDROCHLORIDE 10 MG: 10 TABLET, FILM COATED ORAL at 08:27

## 2017-04-30 RX ADMIN — ENOXAPARIN SODIUM 40 MG: 40 INJECTION SUBCUTANEOUS at 08:27

## 2017-04-30 RX ADMIN — METOPROLOL SUCCINATE 100 MG: 100 TABLET, FILM COATED, EXTENDED RELEASE ORAL at 08:27

## 2017-04-30 RX ADMIN — BUMETANIDE 2 MG: 0.25 INJECTION, SOLUTION INTRAMUSCULAR; INTRAVENOUS at 09:32

## 2017-04-30 RX ADMIN — HYDROCHLOROTHIAZIDE 12.5 MG: 12.5 CAPSULE, GELATIN COATED ORAL at 08:27

## 2017-05-01 ENCOUNTER — APPOINTMENT (OUTPATIENT)
Dept: GENERAL RADIOLOGY | Facility: HOSPITAL | Age: 82
End: 2017-05-01

## 2017-05-01 LAB
ANION GAP SERPL CALCULATED.3IONS-SCNC: 16.7 MMOL/L
BASOPHILS # BLD AUTO: 0.02 10*3/MM3 (ref 0–0.2)
BASOPHILS NFR BLD AUTO: 0.3 % (ref 0–1.5)
BUN BLD-MCNC: 26 MG/DL (ref 8–23)
BUN/CREAT SERPL: 25 (ref 7–25)
CALCIUM SPEC-SCNC: 8.8 MG/DL (ref 8.2–9.6)
CHLORIDE SERPL-SCNC: 94 MMOL/L (ref 98–107)
CO2 SERPL-SCNC: 28.3 MMOL/L (ref 22–29)
CREAT BLD-MCNC: 1.04 MG/DL (ref 0.57–1)
DEPRECATED RDW RBC AUTO: 48 FL (ref 37–54)
EOSINOPHIL # BLD AUTO: 0.24 10*3/MM3 (ref 0–0.7)
EOSINOPHIL NFR BLD AUTO: 3.2 % (ref 0.3–6.2)
ERYTHROCYTE [DISTWIDTH] IN BLOOD BY AUTOMATED COUNT: 14.9 % (ref 11.7–13)
GFR SERPL CREATININE-BSD FRML MDRD: 50 ML/MIN/1.73
GLUCOSE BLD-MCNC: 157 MG/DL (ref 65–99)
GLUCOSE BLDC GLUCOMTR-MCNC: 113 MG/DL (ref 70–130)
GLUCOSE BLDC GLUCOMTR-MCNC: 136 MG/DL (ref 70–130)
GLUCOSE BLDC GLUCOMTR-MCNC: 143 MG/DL (ref 70–130)
GLUCOSE BLDC GLUCOMTR-MCNC: 143 MG/DL (ref 70–130)
GLUCOSE BLDC GLUCOMTR-MCNC: 155 MG/DL (ref 70–130)
HCT VFR BLD AUTO: 36.7 % (ref 35.6–45.5)
HGB BLD-MCNC: 11.7 G/DL (ref 11.9–15.5)
IMM GRANULOCYTES # BLD: 0.03 10*3/MM3 (ref 0–0.03)
IMM GRANULOCYTES NFR BLD: 0.4 % (ref 0–0.5)
LYMPHOCYTES # BLD AUTO: 1.46 10*3/MM3 (ref 0.9–4.8)
LYMPHOCYTES NFR BLD AUTO: 19.5 % (ref 19.6–45.3)
MCH RBC QN AUTO: 28.6 PG (ref 26.9–32)
MCHC RBC AUTO-ENTMCNC: 31.9 G/DL (ref 32.4–36.3)
MCV RBC AUTO: 89.7 FL (ref 80.5–98.2)
MONOCYTES # BLD AUTO: 0.67 10*3/MM3 (ref 0.2–1.2)
MONOCYTES NFR BLD AUTO: 8.9 % (ref 5–12)
NEUTROPHILS # BLD AUTO: 5.07 10*3/MM3 (ref 1.9–8.1)
NEUTROPHILS NFR BLD AUTO: 67.7 % (ref 42.7–76)
PLATELET # BLD AUTO: 219 10*3/MM3 (ref 140–500)
PMV BLD AUTO: 10.8 FL (ref 6–12)
POTASSIUM BLD-SCNC: 3.2 MMOL/L (ref 3.5–5.2)
RBC # BLD AUTO: 4.09 10*6/MM3 (ref 3.9–5.2)
SODIUM BLD-SCNC: 139 MMOL/L (ref 136–145)
WBC NRBC COR # BLD: 7.49 10*3/MM3 (ref 4.5–10.7)

## 2017-05-01 PROCEDURE — 93005 ELECTROCARDIOGRAM TRACING: CPT | Performed by: NURSE PRACTITIONER

## 2017-05-01 PROCEDURE — 82962 GLUCOSE BLOOD TEST: CPT

## 2017-05-01 PROCEDURE — 85025 COMPLETE CBC W/AUTO DIFF WBC: CPT | Performed by: INTERNAL MEDICINE

## 2017-05-01 PROCEDURE — 99232 SBSQ HOSP IP/OBS MODERATE 35: CPT | Performed by: INTERNAL MEDICINE

## 2017-05-01 PROCEDURE — 97110 THERAPEUTIC EXERCISES: CPT

## 2017-05-01 PROCEDURE — 97165 OT EVAL LOW COMPLEX 30 MIN: CPT | Performed by: OCCUPATIONAL THERAPIST

## 2017-05-01 PROCEDURE — 97162 PT EVAL MOD COMPLEX 30 MIN: CPT

## 2017-05-01 PROCEDURE — 80048 BASIC METABOLIC PNL TOTAL CA: CPT | Performed by: INTERNAL MEDICINE

## 2017-05-01 PROCEDURE — 71010 HC CHEST PA OR AP: CPT

## 2017-05-01 PROCEDURE — 99233 SBSQ HOSP IP/OBS HIGH 50: CPT | Performed by: INTERNAL MEDICINE

## 2017-05-01 PROCEDURE — 93010 ELECTROCARDIOGRAM REPORT: CPT | Performed by: INTERNAL MEDICINE

## 2017-05-01 RX ORDER — POTASSIUM CHLORIDE 1.5 G/1.77G
40 POWDER, FOR SOLUTION ORAL AS NEEDED
Status: DISCONTINUED | OUTPATIENT
Start: 2017-05-01 | End: 2017-05-01 | Stop reason: CLARIF

## 2017-05-01 RX ORDER — POTASSIUM CHLORIDE 750 MG/1
40 CAPSULE, EXTENDED RELEASE ORAL AS NEEDED
Status: DISCONTINUED | OUTPATIENT
Start: 2017-05-01 | End: 2017-05-05 | Stop reason: HOSPADM

## 2017-05-01 RX ORDER — BUMETANIDE 0.25 MG/ML
2 INJECTION INTRAMUSCULAR; INTRAVENOUS EVERY 12 HOURS
Status: DISCONTINUED | OUTPATIENT
Start: 2017-05-01 | End: 2017-05-02

## 2017-05-01 RX ORDER — POTASSIUM CHLORIDE 7.45 MG/ML
10 INJECTION INTRAVENOUS
Status: DISCONTINUED | OUTPATIENT
Start: 2017-05-01 | End: 2017-05-05 | Stop reason: HOSPADM

## 2017-05-01 RX ADMIN — ASPIRIN 81 MG: 81 TABLET ORAL at 10:13

## 2017-05-01 RX ADMIN — POTASSIUM CHLORIDE 40 MEQ: 750 CAPSULE, EXTENDED RELEASE ORAL at 20:16

## 2017-05-01 RX ADMIN — DOCUSATE SODIUM 100 MG: 100 CAPSULE, LIQUID FILLED ORAL at 10:13

## 2017-05-01 RX ADMIN — DONEPEZIL HYDROCHLORIDE 10 MG: 10 TABLET, FILM COATED ORAL at 10:13

## 2017-05-01 RX ADMIN — POTASSIUM CHLORIDE 40 MEQ: 750 CAPSULE, EXTENDED RELEASE ORAL at 16:34

## 2017-05-01 RX ADMIN — BUMETANIDE 2 MG: 0.25 INJECTION, SOLUTION INTRAMUSCULAR; INTRAVENOUS at 00:12

## 2017-05-02 LAB
ANION GAP SERPL CALCULATED.3IONS-SCNC: 12.1 MMOL/L
BASOPHILS # BLD AUTO: 0.02 10*3/MM3 (ref 0–0.2)
BASOPHILS NFR BLD AUTO: 0.2 % (ref 0–1.5)
BUN BLD-MCNC: 22 MG/DL (ref 8–23)
BUN/CREAT SERPL: 28.2 (ref 7–25)
CALCIUM SPEC-SCNC: 8.8 MG/DL (ref 8.2–9.6)
CHLORIDE SERPL-SCNC: 98 MMOL/L (ref 98–107)
CO2 SERPL-SCNC: 27.9 MMOL/L (ref 22–29)
CREAT BLD-MCNC: 0.78 MG/DL (ref 0.57–1)
DEPRECATED RDW RBC AUTO: 49.8 FL (ref 37–54)
EOSINOPHIL # BLD AUTO: 0.23 10*3/MM3 (ref 0–0.7)
EOSINOPHIL NFR BLD AUTO: 2.6 % (ref 0.3–6.2)
ERYTHROCYTE [DISTWIDTH] IN BLOOD BY AUTOMATED COUNT: 15 % (ref 11.7–13)
GFR SERPL CREATININE-BSD FRML MDRD: 69 ML/MIN/1.73
GLUCOSE BLD-MCNC: 132 MG/DL (ref 65–99)
GLUCOSE BLDC GLUCOMTR-MCNC: 153 MG/DL (ref 70–130)
GLUCOSE BLDC GLUCOMTR-MCNC: 205 MG/DL (ref 70–130)
GLUCOSE BLDC GLUCOMTR-MCNC: 213 MG/DL (ref 70–130)
GLUCOSE BLDC GLUCOMTR-MCNC: 239 MG/DL (ref 70–130)
HCT VFR BLD AUTO: 40.3 % (ref 35.6–45.5)
HGB BLD-MCNC: 12.8 G/DL (ref 11.9–15.5)
IMM GRANULOCYTES # BLD: 0.04 10*3/MM3 (ref 0–0.03)
IMM GRANULOCYTES NFR BLD: 0.4 % (ref 0–0.5)
LYMPHOCYTES # BLD AUTO: 2.09 10*3/MM3 (ref 0.9–4.8)
LYMPHOCYTES NFR BLD AUTO: 23.4 % (ref 19.6–45.3)
MCH RBC QN AUTO: 29.2 PG (ref 26.9–32)
MCHC RBC AUTO-ENTMCNC: 31.8 G/DL (ref 32.4–36.3)
MCV RBC AUTO: 91.8 FL (ref 80.5–98.2)
MONOCYTES # BLD AUTO: 0.84 10*3/MM3 (ref 0.2–1.2)
MONOCYTES NFR BLD AUTO: 9.4 % (ref 5–12)
NEUTROPHILS # BLD AUTO: 5.72 10*3/MM3 (ref 1.9–8.1)
NEUTROPHILS NFR BLD AUTO: 64 % (ref 42.7–76)
PLATELET # BLD AUTO: 255 10*3/MM3 (ref 140–500)
PMV BLD AUTO: 10.3 FL (ref 6–12)
POTASSIUM BLD-SCNC: 4.3 MMOL/L (ref 3.5–5.2)
RBC # BLD AUTO: 4.39 10*6/MM3 (ref 3.9–5.2)
SODIUM BLD-SCNC: 138 MMOL/L (ref 136–145)
WBC NRBC COR # BLD: 8.94 10*3/MM3 (ref 4.5–10.7)

## 2017-05-02 PROCEDURE — 85025 COMPLETE CBC W/AUTO DIFF WBC: CPT | Performed by: INTERNAL MEDICINE

## 2017-05-02 PROCEDURE — 63710000001 PROMETHAZINE PER 25 MG: Performed by: INTERNAL MEDICINE

## 2017-05-02 PROCEDURE — 82962 GLUCOSE BLOOD TEST: CPT

## 2017-05-02 PROCEDURE — 99232 SBSQ HOSP IP/OBS MODERATE 35: CPT | Performed by: INTERNAL MEDICINE

## 2017-05-02 PROCEDURE — 97110 THERAPEUTIC EXERCISES: CPT

## 2017-05-02 PROCEDURE — 93005 ELECTROCARDIOGRAM TRACING: CPT | Performed by: NURSE PRACTITIONER

## 2017-05-02 PROCEDURE — 25010000002 ENOXAPARIN PER 10 MG: Performed by: INTERNAL MEDICINE

## 2017-05-02 PROCEDURE — 99233 SBSQ HOSP IP/OBS HIGH 50: CPT | Performed by: INTERNAL MEDICINE

## 2017-05-02 PROCEDURE — 80048 BASIC METABOLIC PNL TOTAL CA: CPT | Performed by: INTERNAL MEDICINE

## 2017-05-02 PROCEDURE — 97535 SELF CARE MNGMENT TRAINING: CPT

## 2017-05-02 PROCEDURE — 93010 ELECTROCARDIOGRAM REPORT: CPT | Performed by: INTERNAL MEDICINE

## 2017-05-02 RX ORDER — METOPROLOL SUCCINATE 50 MG/1
50 TABLET, EXTENDED RELEASE ORAL ONCE
Status: COMPLETED | OUTPATIENT
Start: 2017-05-02 | End: 2017-05-02

## 2017-05-02 RX ORDER — METOPROLOL SUCCINATE 50 MG/1
50 TABLET, EXTENDED RELEASE ORAL DAILY
Status: DISCONTINUED | OUTPATIENT
Start: 2017-05-03 | End: 2017-05-05 | Stop reason: HOSPADM

## 2017-05-02 RX ORDER — FUROSEMIDE 40 MG/1
40 TABLET ORAL 2 TIMES DAILY
Status: DISCONTINUED | OUTPATIENT
Start: 2017-05-02 | End: 2017-05-05 | Stop reason: HOSPADM

## 2017-05-02 RX ORDER — BUMETANIDE 0.25 MG/ML
1 INJECTION INTRAMUSCULAR; INTRAVENOUS ONCE
Status: DISCONTINUED | OUTPATIENT
Start: 2017-05-02 | End: 2017-05-05 | Stop reason: HOSPADM

## 2017-05-02 RX ORDER — ALBUTEROL SULFATE 2.5 MG/3ML
2.5 SOLUTION RESPIRATORY (INHALATION) EVERY 4 HOURS PRN
Status: DISCONTINUED | OUTPATIENT
Start: 2017-05-02 | End: 2017-05-05 | Stop reason: HOSPADM

## 2017-05-02 RX ORDER — BUMETANIDE 0.25 MG/ML
0.5 INJECTION INTRAMUSCULAR; INTRAVENOUS ONCE
Status: COMPLETED | OUTPATIENT
Start: 2017-05-02 | End: 2017-05-02

## 2017-05-02 RX ADMIN — METOPROLOL SUCCINATE 50 MG: 50 TABLET, FILM COATED, EXTENDED RELEASE ORAL at 16:46

## 2017-05-02 RX ADMIN — ENOXAPARIN SODIUM 40 MG: 40 INJECTION SUBCUTANEOUS at 08:51

## 2017-05-02 RX ADMIN — BUMETANIDE 0.5 MG: 0.25 INJECTION, SOLUTION INTRAMUSCULAR; INTRAVENOUS at 14:11

## 2017-05-02 RX ADMIN — SODIUM CHLORIDE 500 ML: 9 INJECTION, SOLUTION INTRAVENOUS at 01:16

## 2017-05-02 RX ADMIN — PROMETHAZINE HYDROCHLORIDE 12.5 MG: 25 TABLET ORAL at 18:04

## 2017-05-02 RX ADMIN — ONDANSETRON 4 MG: 4 TABLET, FILM COATED ORAL at 16:45

## 2017-05-02 RX ADMIN — DONEPEZIL HYDROCHLORIDE 10 MG: 10 TABLET, FILM COATED ORAL at 08:51

## 2017-05-02 RX ADMIN — SPIRONOLACTONE 25 MG: 25 TABLET, FILM COATED ORAL at 11:17

## 2017-05-02 RX ADMIN — ASPIRIN 81 MG: 81 TABLET ORAL at 08:51

## 2017-05-02 RX ADMIN — FUROSEMIDE 40 MG: 40 TABLET ORAL at 18:27

## 2017-05-02 RX ADMIN — FUROSEMIDE 40 MG: 40 TABLET ORAL at 13:30

## 2017-05-02 RX ADMIN — IVABRADINE 7.5 MG: 5 TABLET, FILM COATED ORAL at 09:07

## 2017-05-02 RX ADMIN — DOCUSATE SODIUM 100 MG: 100 CAPSULE, LIQUID FILLED ORAL at 18:27

## 2017-05-02 RX ADMIN — METOPROLOL SUCCINATE 50 MG: 100 TABLET, FILM COATED, EXTENDED RELEASE ORAL at 09:07

## 2017-05-02 RX ADMIN — DOCUSATE SODIUM 100 MG: 100 CAPSULE, LIQUID FILLED ORAL at 08:51

## 2017-05-02 RX ADMIN — IVABRADINE 7.5 MG: 5 TABLET, FILM COATED ORAL at 22:02

## 2017-05-03 LAB
ANION GAP SERPL CALCULATED.3IONS-SCNC: 16.9 MMOL/L
BASOPHILS # BLD AUTO: 0.02 10*3/MM3 (ref 0–0.2)
BASOPHILS NFR BLD AUTO: 0.2 % (ref 0–1.5)
BUN BLD-MCNC: 22 MG/DL (ref 8–23)
BUN/CREAT SERPL: 25 (ref 7–25)
CALCIUM SPEC-SCNC: 9.2 MG/DL (ref 8.2–9.6)
CHLORIDE SERPL-SCNC: 94 MMOL/L (ref 98–107)
CO2 SERPL-SCNC: 24.1 MMOL/L (ref 22–29)
CREAT BLD-MCNC: 0.88 MG/DL (ref 0.57–1)
DEPRECATED RDW RBC AUTO: 50 FL (ref 37–54)
EOSINOPHIL # BLD AUTO: 0.05 10*3/MM3 (ref 0–0.7)
EOSINOPHIL NFR BLD AUTO: 0.5 % (ref 0.3–6.2)
ERYTHROCYTE [DISTWIDTH] IN BLOOD BY AUTOMATED COUNT: 15.2 % (ref 11.7–13)
GFR SERPL CREATININE-BSD FRML MDRD: 60 ML/MIN/1.73
GLUCOSE BLD-MCNC: 152 MG/DL (ref 65–99)
GLUCOSE BLDC GLUCOMTR-MCNC: 157 MG/DL (ref 70–130)
GLUCOSE BLDC GLUCOMTR-MCNC: 163 MG/DL (ref 70–130)
GLUCOSE BLDC GLUCOMTR-MCNC: 184 MG/DL (ref 70–130)
GLUCOSE BLDC GLUCOMTR-MCNC: 192 MG/DL (ref 70–130)
HCT VFR BLD AUTO: 39.7 % (ref 35.6–45.5)
HGB BLD-MCNC: 12.7 G/DL (ref 11.9–15.5)
IMM GRANULOCYTES # BLD: 0.03 10*3/MM3 (ref 0–0.03)
IMM GRANULOCYTES NFR BLD: 0.3 % (ref 0–0.5)
LYMPHOCYTES # BLD AUTO: 2.01 10*3/MM3 (ref 0.9–4.8)
LYMPHOCYTES NFR BLD AUTO: 21.2 % (ref 19.6–45.3)
MCH RBC QN AUTO: 29.3 PG (ref 26.9–32)
MCHC RBC AUTO-ENTMCNC: 32 G/DL (ref 32.4–36.3)
MCV RBC AUTO: 91.5 FL (ref 80.5–98.2)
MONOCYTES # BLD AUTO: 0.77 10*3/MM3 (ref 0.2–1.2)
MONOCYTES NFR BLD AUTO: 8.1 % (ref 5–12)
NEUTROPHILS # BLD AUTO: 6.61 10*3/MM3 (ref 1.9–8.1)
NEUTROPHILS NFR BLD AUTO: 69.7 % (ref 42.7–76)
PLATELET # BLD AUTO: 282 10*3/MM3 (ref 140–500)
PMV BLD AUTO: 10.4 FL (ref 6–12)
POTASSIUM BLD-SCNC: 4.1 MMOL/L (ref 3.5–5.2)
RBC # BLD AUTO: 4.34 10*6/MM3 (ref 3.9–5.2)
SODIUM BLD-SCNC: 135 MMOL/L (ref 136–145)
WBC NRBC COR # BLD: 9.49 10*3/MM3 (ref 4.5–10.7)

## 2017-05-03 PROCEDURE — 80048 BASIC METABOLIC PNL TOTAL CA: CPT | Performed by: INTERNAL MEDICINE

## 2017-05-03 PROCEDURE — 25010000002 ENOXAPARIN PER 10 MG: Performed by: INTERNAL MEDICINE

## 2017-05-03 PROCEDURE — 97535 SELF CARE MNGMENT TRAINING: CPT | Performed by: OCCUPATIONAL THERAPIST

## 2017-05-03 PROCEDURE — 99232 SBSQ HOSP IP/OBS MODERATE 35: CPT | Performed by: INTERNAL MEDICINE

## 2017-05-03 PROCEDURE — 97110 THERAPEUTIC EXERCISES: CPT

## 2017-05-03 PROCEDURE — 82962 GLUCOSE BLOOD TEST: CPT

## 2017-05-03 PROCEDURE — 85025 COMPLETE CBC W/AUTO DIFF WBC: CPT | Performed by: INTERNAL MEDICINE

## 2017-05-03 RX ADMIN — ASPIRIN 81 MG: 81 TABLET ORAL at 08:46

## 2017-05-03 RX ADMIN — IVABRADINE 7.5 MG: 5 TABLET, FILM COATED ORAL at 17:55

## 2017-05-03 RX ADMIN — FUROSEMIDE 40 MG: 40 TABLET ORAL at 08:46

## 2017-05-03 RX ADMIN — METOPROLOL SUCCINATE 50 MG: 50 TABLET, FILM COATED, EXTENDED RELEASE ORAL at 08:46

## 2017-05-03 RX ADMIN — DOCUSATE SODIUM 100 MG: 100 CAPSULE, LIQUID FILLED ORAL at 08:46

## 2017-05-03 RX ADMIN — DONEPEZIL HYDROCHLORIDE 10 MG: 10 TABLET, FILM COATED ORAL at 08:45

## 2017-05-03 RX ADMIN — IVABRADINE 7.5 MG: 5 TABLET, FILM COATED ORAL at 08:47

## 2017-05-03 RX ADMIN — ENALAPRIL MALEATE 20 MG: 20 TABLET ORAL at 13:00

## 2017-05-03 RX ADMIN — FUROSEMIDE 40 MG: 40 TABLET ORAL at 17:54

## 2017-05-03 RX ADMIN — DOCUSATE SODIUM 100 MG: 100 CAPSULE, LIQUID FILLED ORAL at 17:54

## 2017-05-03 RX ADMIN — SPIRONOLACTONE 25 MG: 25 TABLET, FILM COATED ORAL at 08:45

## 2017-05-03 RX ADMIN — ENOXAPARIN SODIUM 40 MG: 40 INJECTION SUBCUTANEOUS at 08:46

## 2017-05-04 LAB
ANION GAP SERPL CALCULATED.3IONS-SCNC: 22.5 MMOL/L
BASOPHILS # BLD AUTO: 0.02 10*3/MM3 (ref 0–0.2)
BASOPHILS NFR BLD AUTO: 0.2 % (ref 0–1.5)
BUN BLD-MCNC: 29 MG/DL (ref 8–23)
BUN/CREAT SERPL: 27.1 (ref 7–25)
CALCIUM SPEC-SCNC: 8.6 MG/DL (ref 8.2–9.6)
CHLORIDE SERPL-SCNC: 96 MMOL/L (ref 98–107)
CO2 SERPL-SCNC: 22.5 MMOL/L (ref 22–29)
CREAT BLD-MCNC: 1.07 MG/DL (ref 0.57–1)
DEPRECATED RDW RBC AUTO: 50.7 FL (ref 37–54)
EOSINOPHIL # BLD AUTO: 0.19 10*3/MM3 (ref 0–0.7)
EOSINOPHIL NFR BLD AUTO: 2.3 % (ref 0.3–6.2)
ERYTHROCYTE [DISTWIDTH] IN BLOOD BY AUTOMATED COUNT: 15.2 % (ref 11.7–13)
GFR SERPL CREATININE-BSD FRML MDRD: 48 ML/MIN/1.73
GLUCOSE BLD-MCNC: 148 MG/DL (ref 65–99)
GLUCOSE BLDC GLUCOMTR-MCNC: 134 MG/DL (ref 70–130)
GLUCOSE BLDC GLUCOMTR-MCNC: 186 MG/DL (ref 70–130)
GLUCOSE BLDC GLUCOMTR-MCNC: 189 MG/DL (ref 70–130)
GLUCOSE BLDC GLUCOMTR-MCNC: 208 MG/DL (ref 70–130)
HCT VFR BLD AUTO: 38.6 % (ref 35.6–45.5)
HGB BLD-MCNC: 12.2 G/DL (ref 11.9–15.5)
IMM GRANULOCYTES # BLD: 0.03 10*3/MM3 (ref 0–0.03)
IMM GRANULOCYTES NFR BLD: 0.4 % (ref 0–0.5)
LYMPHOCYTES # BLD AUTO: 1.89 10*3/MM3 (ref 0.9–4.8)
LYMPHOCYTES NFR BLD AUTO: 22.5 % (ref 19.6–45.3)
MCH RBC QN AUTO: 29.3 PG (ref 26.9–32)
MCHC RBC AUTO-ENTMCNC: 31.6 G/DL (ref 32.4–36.3)
MCV RBC AUTO: 92.6 FL (ref 80.5–98.2)
MONOCYTES # BLD AUTO: 0.83 10*3/MM3 (ref 0.2–1.2)
MONOCYTES NFR BLD AUTO: 9.9 % (ref 5–12)
NEUTROPHILS # BLD AUTO: 5.44 10*3/MM3 (ref 1.9–8.1)
NEUTROPHILS NFR BLD AUTO: 64.7 % (ref 42.7–76)
PLATELET # BLD AUTO: 211 10*3/MM3 (ref 140–500)
PMV BLD AUTO: 10.5 FL (ref 6–12)
POTASSIUM BLD-SCNC: 4.4 MMOL/L (ref 3.5–5.2)
RBC # BLD AUTO: 4.17 10*6/MM3 (ref 3.9–5.2)
SODIUM BLD-SCNC: 141 MMOL/L (ref 136–145)
WBC NRBC COR # BLD: 8.4 10*3/MM3 (ref 4.5–10.7)

## 2017-05-04 PROCEDURE — 99233 SBSQ HOSP IP/OBS HIGH 50: CPT | Performed by: INTERNAL MEDICINE

## 2017-05-04 PROCEDURE — 25010000002 DIGOXIN PER 500 MCG: Performed by: INTERNAL MEDICINE

## 2017-05-04 PROCEDURE — 94762 N-INVAS EAR/PLS OXIMTRY CONT: CPT

## 2017-05-04 PROCEDURE — 25010000002 ENOXAPARIN PER 10 MG: Performed by: INTERNAL MEDICINE

## 2017-05-04 PROCEDURE — 80048 BASIC METABOLIC PNL TOTAL CA: CPT | Performed by: INTERNAL MEDICINE

## 2017-05-04 PROCEDURE — 85025 COMPLETE CBC W/AUTO DIFF WBC: CPT | Performed by: INTERNAL MEDICINE

## 2017-05-04 PROCEDURE — 99232 SBSQ HOSP IP/OBS MODERATE 35: CPT | Performed by: INTERNAL MEDICINE

## 2017-05-04 PROCEDURE — 93010 ELECTROCARDIOGRAM REPORT: CPT | Performed by: INTERNAL MEDICINE

## 2017-05-04 PROCEDURE — 97110 THERAPEUTIC EXERCISES: CPT

## 2017-05-04 PROCEDURE — 93005 ELECTROCARDIOGRAM TRACING: CPT | Performed by: NURSE PRACTITIONER

## 2017-05-04 PROCEDURE — 82962 GLUCOSE BLOOD TEST: CPT

## 2017-05-04 RX ORDER — DIGOXIN 0.25 MG/ML
250 INJECTION INTRAMUSCULAR; INTRAVENOUS EVERY 6 HOURS
Status: COMPLETED | OUTPATIENT
Start: 2017-05-04 | End: 2017-05-05

## 2017-05-04 RX ORDER — DIGOXIN 125 MCG
125 TABLET ORAL
Status: DISCONTINUED | OUTPATIENT
Start: 2017-05-05 | End: 2017-05-05 | Stop reason: HOSPADM

## 2017-05-04 RX ADMIN — DONEPEZIL HYDROCHLORIDE 10 MG: 10 TABLET, FILM COATED ORAL at 08:20

## 2017-05-04 RX ADMIN — ENALAPRIL MALEATE 20 MG: 20 TABLET ORAL at 08:20

## 2017-05-04 RX ADMIN — FUROSEMIDE 40 MG: 40 TABLET ORAL at 08:20

## 2017-05-04 RX ADMIN — DOCUSATE SODIUM 100 MG: 100 CAPSULE, LIQUID FILLED ORAL at 08:20

## 2017-05-04 RX ADMIN — IVABRADINE 7.5 MG: 5 TABLET, FILM COATED ORAL at 18:19

## 2017-05-04 RX ADMIN — ASPIRIN 81 MG: 81 TABLET ORAL at 08:20

## 2017-05-04 RX ADMIN — METOPROLOL SUCCINATE 50 MG: 50 TABLET, FILM COATED, EXTENDED RELEASE ORAL at 08:20

## 2017-05-04 RX ADMIN — DIGOXIN 250 MCG: 250 INJECTION, SOLUTION INTRAMUSCULAR; INTRAVENOUS at 23:07

## 2017-05-04 RX ADMIN — ENOXAPARIN SODIUM 40 MG: 40 INJECTION SUBCUTANEOUS at 08:20

## 2017-05-04 RX ADMIN — IVABRADINE 7.5 MG: 5 TABLET, FILM COATED ORAL at 08:21

## 2017-05-04 RX ADMIN — DOCUSATE SODIUM 100 MG: 100 CAPSULE, LIQUID FILLED ORAL at 18:19

## 2017-05-04 RX ADMIN — DIGOXIN 250 MCG: 250 INJECTION, SOLUTION INTRAMUSCULAR; INTRAVENOUS at 18:19

## 2017-05-04 RX ADMIN — FUROSEMIDE 40 MG: 40 TABLET ORAL at 18:18

## 2017-05-04 RX ADMIN — SPIRONOLACTONE 25 MG: 25 TABLET, FILM COATED ORAL at 08:20

## 2017-05-05 VITALS
HEART RATE: 100 BPM | DIASTOLIC BLOOD PRESSURE: 66 MMHG | SYSTOLIC BLOOD PRESSURE: 125 MMHG | OXYGEN SATURATION: 94 % | TEMPERATURE: 97.4 F | RESPIRATION RATE: 18 BRPM | BODY MASS INDEX: 26.06 KG/M2 | HEIGHT: 61 IN | WEIGHT: 138 LBS

## 2017-05-05 LAB
ANION GAP SERPL CALCULATED.3IONS-SCNC: 15.5 MMOL/L
BUN BLD-MCNC: 28 MG/DL (ref 8–23)
BUN/CREAT SERPL: 26.7 (ref 7–25)
CALCIUM SPEC-SCNC: 9.2 MG/DL (ref 8.2–9.6)
CHLORIDE SERPL-SCNC: 95 MMOL/L (ref 98–107)
CO2 SERPL-SCNC: 26.5 MMOL/L (ref 22–29)
CREAT BLD-MCNC: 1.05 MG/DL (ref 0.57–1)
DIGOXIN SERPL-MCNC: 1.1 NG/ML (ref 0.6–1.2)
GFR SERPL CREATININE-BSD FRML MDRD: 49 ML/MIN/1.73
GLUCOSE BLD-MCNC: 141 MG/DL (ref 65–99)
GLUCOSE BLDC GLUCOMTR-MCNC: 132 MG/DL (ref 70–130)
GLUCOSE BLDC GLUCOMTR-MCNC: 153 MG/DL (ref 70–130)
GLUCOSE BLDC GLUCOMTR-MCNC: 197 MG/DL (ref 70–130)
POTASSIUM BLD-SCNC: 4 MMOL/L (ref 3.5–5.2)
SODIUM BLD-SCNC: 137 MMOL/L (ref 136–145)

## 2017-05-05 PROCEDURE — 80048 BASIC METABOLIC PNL TOTAL CA: CPT | Performed by: INTERNAL MEDICINE

## 2017-05-05 PROCEDURE — 80162 ASSAY OF DIGOXIN TOTAL: CPT | Performed by: INTERNAL MEDICINE

## 2017-05-05 PROCEDURE — 25010000002 ENOXAPARIN PER 10 MG: Performed by: INTERNAL MEDICINE

## 2017-05-05 PROCEDURE — 25010000002 DIGOXIN PER 500 MCG: Performed by: INTERNAL MEDICINE

## 2017-05-05 PROCEDURE — 82962 GLUCOSE BLOOD TEST: CPT

## 2017-05-05 PROCEDURE — 94620 HC PULMONARY STRESS TEST SIMPLE: CPT

## 2017-05-05 PROCEDURE — 99232 SBSQ HOSP IP/OBS MODERATE 35: CPT | Performed by: INTERNAL MEDICINE

## 2017-05-05 PROCEDURE — 97110 THERAPEUTIC EXERCISES: CPT

## 2017-05-05 PROCEDURE — 99239 HOSP IP/OBS DSCHRG MGMT >30: CPT | Performed by: INTERNAL MEDICINE

## 2017-05-05 PROCEDURE — 93010 ELECTROCARDIOGRAM REPORT: CPT | Performed by: INTERNAL MEDICINE

## 2017-05-05 PROCEDURE — 93005 ELECTROCARDIOGRAM TRACING: CPT | Performed by: NURSE PRACTITIONER

## 2017-05-05 RX ORDER — ALBUTEROL SULFATE 2.5 MG/3ML
2.5 SOLUTION RESPIRATORY (INHALATION) EVERY 4 HOURS PRN
Refills: 12 | Status: ON HOLD
Start: 2017-05-05 | End: 2017-10-07

## 2017-05-05 RX ORDER — ONDANSETRON 4 MG/1
4 TABLET, FILM COATED ORAL EVERY 6 HOURS PRN
Qty: 60 TABLET | Refills: 0
Start: 2017-05-05 | End: 2017-12-02

## 2017-05-05 RX ORDER — MUSCLE RUB CREAM 100; 150 MG/G; MG/G
CREAM TOPICAL
Refills: 0
Start: 2017-05-05

## 2017-05-05 RX ORDER — ACETAMINOPHEN 325 MG/1
650 TABLET ORAL EVERY 4 HOURS PRN
Refills: 0
Start: 2017-05-05 | End: 2017-10-12 | Stop reason: HOSPADM

## 2017-05-05 RX ORDER — HYDROCODONE BITARTRATE AND ACETAMINOPHEN 5; 325 MG/1; MG/1
1 TABLET ORAL EVERY 4 HOURS PRN
Qty: 40 TABLET | Refills: 0 | Status: SHIPPED | OUTPATIENT
Start: 2017-05-05 | End: 2017-05-09

## 2017-05-05 RX ORDER — SPIRONOLACTONE 25 MG/1
25 TABLET ORAL DAILY
Start: 2017-05-05 | End: 2017-10-07 | Stop reason: SDUPTHER

## 2017-05-05 RX ORDER — ACETAMINOPHEN 650 MG/1
650 SUPPOSITORY RECTAL EVERY 4 HOURS PRN
Refills: 0
Start: 2017-05-05 | End: 2017-10-12 | Stop reason: HOSPADM

## 2017-05-05 RX ORDER — PSEUDOEPHEDRINE HCL 30 MG
100 TABLET ORAL 2 TIMES DAILY
Refills: 0 | Status: ON HOLD
Start: 2017-05-05 | End: 2017-10-07

## 2017-05-05 RX ORDER — CALCIUM CARBONATE 200(500)MG
2 TABLET,CHEWABLE ORAL 2 TIMES DAILY PRN
Start: 2017-05-05 | End: 2017-12-02

## 2017-05-05 RX ORDER — DIGOXIN 125 MCG
125 TABLET ORAL
Start: 2017-05-05 | End: 2017-10-07 | Stop reason: SDUPTHER

## 2017-05-05 RX ORDER — BISACODYL 10 MG
10 SUPPOSITORY, RECTAL RECTAL DAILY PRN
Refills: 0
Start: 2017-05-05 | End: 2017-12-02

## 2017-05-05 RX ORDER — MAGNESIUM HYDROXIDE/ALUMINUM HYDROXICE/SIMETHICONE 120; 1200; 1200 MG/30ML; MG/30ML; MG/30ML
30 SUSPENSION ORAL EVERY 6 HOURS PRN
Refills: 0 | Status: ON HOLD
Start: 2017-05-05 | End: 2017-10-07

## 2017-05-05 RX ADMIN — METOPROLOL SUCCINATE 50 MG: 50 TABLET, FILM COATED, EXTENDED RELEASE ORAL at 08:22

## 2017-05-05 RX ADMIN — IVABRADINE 7.5 MG: 5 TABLET, FILM COATED ORAL at 17:43

## 2017-05-05 RX ADMIN — SPIRONOLACTONE 25 MG: 25 TABLET, FILM COATED ORAL at 08:22

## 2017-05-05 RX ADMIN — DIGOXIN 250 MCG: 250 INJECTION, SOLUTION INTRAMUSCULAR; INTRAVENOUS at 05:54

## 2017-05-05 RX ADMIN — DIGOXIN 125 MCG: 0.12 TABLET ORAL at 12:50

## 2017-05-05 RX ADMIN — ENOXAPARIN SODIUM 40 MG: 40 INJECTION SUBCUTANEOUS at 08:22

## 2017-05-05 RX ADMIN — ASPIRIN 81 MG: 81 TABLET ORAL at 08:22

## 2017-05-05 RX ADMIN — FUROSEMIDE 40 MG: 40 TABLET ORAL at 08:22

## 2017-05-05 RX ADMIN — IVABRADINE 7.5 MG: 5 TABLET, FILM COATED ORAL at 08:22

## 2017-05-05 RX ADMIN — DOCUSATE SODIUM 100 MG: 100 CAPSULE, LIQUID FILLED ORAL at 08:22

## 2017-05-05 RX ADMIN — DOCUSATE SODIUM 100 MG: 100 CAPSULE, LIQUID FILLED ORAL at 17:43

## 2017-05-05 RX ADMIN — DONEPEZIL HYDROCHLORIDE 10 MG: 10 TABLET, FILM COATED ORAL at 08:22

## 2017-05-05 RX ADMIN — ENALAPRIL MALEATE 20 MG: 20 TABLET ORAL at 08:22

## 2017-05-05 RX ADMIN — FUROSEMIDE 40 MG: 40 TABLET ORAL at 17:42

## 2017-05-08 ENCOUNTER — OUTSIDE FACILITY SERVICE (OUTPATIENT)
Dept: FAMILY MEDICINE CLINIC | Facility: CLINIC | Age: 82
End: 2017-05-08

## 2017-05-08 PROCEDURE — 99305 1ST NF CARE MODERATE MDM 35: CPT | Performed by: INTERNAL MEDICINE

## 2017-10-07 ENCOUNTER — APPOINTMENT (OUTPATIENT)
Dept: CT IMAGING | Facility: HOSPITAL | Age: 82
End: 2017-10-07

## 2017-10-07 ENCOUNTER — APPOINTMENT (OUTPATIENT)
Dept: GENERAL RADIOLOGY | Facility: HOSPITAL | Age: 82
End: 2017-10-07

## 2017-10-07 ENCOUNTER — HOSPITAL ENCOUNTER (INPATIENT)
Facility: HOSPITAL | Age: 82
LOS: 5 days | Discharge: NURSING, REHAB OR LONG-TERM CARE | End: 2017-10-12
Attending: FAMILY MEDICINE | Admitting: HOSPITALIST

## 2017-10-07 DIAGNOSIS — R26.2 DIFFICULTY WALKING: ICD-10-CM
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
## 2017-10-07 DIAGNOSIS — S72.011A CLOSED SUBCAPITAL FRACTURE OF RIGHT FEMUR, INITIAL ENCOUNTER (HCC): ICD-10-CM

## 2017-10-07 PROBLEM — R73.9 HYPERGLYCEMIA: Status: ACTIVE | Noted: 2017-10-07

## 2017-10-07 PROBLEM — N17.9 ACUTE KIDNEY INJURY (HCC): Status: ACTIVE | Noted: 2017-10-07

## 2017-10-07 PROBLEM — D72.829 LEUKOCYTOSIS: Status: ACTIVE | Noted: 2017-10-07

## 2017-10-07 PROBLEM — M80.00XA OSTEOPOROSIS WITH PATHOLOGICAL FRACTURE: Status: ACTIVE | Noted: 2017-04-30

## 2017-10-07 PROBLEM — S72.012A CLOSED SUBCAPITAL FRACTURE OF LEFT FEMUR (HCC): Status: ACTIVE | Noted: 2017-10-07

## 2017-10-07 LAB
ALBUMIN SERPL-MCNC: 3.7 G/DL (ref 3.5–5.2)
ALBUMIN/GLOB SERPL: 1.1 G/DL
ALP SERPL-CCNC: 72 U/L (ref 39–117)
ALT SERPL W P-5'-P-CCNC: 12 U/L (ref 1–33)
ANION GAP SERPL CALCULATED.3IONS-SCNC: 13.7 MMOL/L
AST SERPL-CCNC: 15 U/L (ref 1–32)
BACTERIA UR QL AUTO: ABNORMAL /HPF
BASOPHILS # BLD AUTO: 0.04 10*3/MM3 (ref 0–0.2)
BASOPHILS NFR BLD AUTO: 0.3 % (ref 0–1.5)
BILIRUB SERPL-MCNC: 0.6 MG/DL (ref 0.1–1.2)
BILIRUB UR QL STRIP: NEGATIVE
BUN BLD-MCNC: 29 MG/DL (ref 8–23)
BUN/CREAT SERPL: 20.9 (ref 7–25)
CALCIUM SPEC-SCNC: 9.1 MG/DL (ref 8.2–9.6)
CHLORIDE SERPL-SCNC: 94 MMOL/L (ref 98–107)
CK SERPL-CCNC: 50 U/L (ref 20–180)
CLARITY UR: ABNORMAL
CO2 SERPL-SCNC: 25.3 MMOL/L (ref 22–29)
COLOR UR: YELLOW
CREAT BLD-MCNC: 1.39 MG/DL (ref 0.57–1)
DEPRECATED RDW RBC AUTO: 46.3 FL (ref 37–54)
DIGOXIN SERPL-MCNC: 1.1 NG/ML (ref 0.6–1.2)
EOSINOPHIL # BLD AUTO: 0.31 10*3/MM3 (ref 0–0.7)
EOSINOPHIL NFR BLD AUTO: 2.2 % (ref 0.3–6.2)
ERYTHROCYTE [DISTWIDTH] IN BLOOD BY AUTOMATED COUNT: 14.2 % (ref 11.7–13)
GFR SERPL CREATININE-BSD FRML MDRD: 36 ML/MIN/1.73
GLOBULIN UR ELPH-MCNC: 3.4 GM/DL
GLUCOSE BLD-MCNC: 280 MG/DL (ref 65–99)
GLUCOSE UR STRIP-MCNC: ABNORMAL MG/DL
HCT VFR BLD AUTO: 40.1 % (ref 35.6–45.5)
HGB BLD-MCNC: 13.2 G/DL (ref 11.9–15.5)
HGB UR QL STRIP.AUTO: NEGATIVE
HYALINE CASTS UR QL AUTO: ABNORMAL /LPF
IMM GRANULOCYTES # BLD: 0.08 10*3/MM3 (ref 0–0.03)
IMM GRANULOCYTES NFR BLD: 0.6 % (ref 0–0.5)
KETONES UR QL STRIP: NEGATIVE
LEUKOCYTE ESTERASE UR QL STRIP.AUTO: ABNORMAL
LYMPHOCYTES # BLD AUTO: 2.44 10*3/MM3 (ref 0.9–4.8)
LYMPHOCYTES NFR BLD AUTO: 17.6 % (ref 19.6–45.3)
MCH RBC QN AUTO: 29.3 PG (ref 26.9–32)
MCHC RBC AUTO-ENTMCNC: 32.9 G/DL (ref 32.4–36.3)
MCV RBC AUTO: 89.1 FL (ref 80.5–98.2)
MONOCYTES # BLD AUTO: 1 10*3/MM3 (ref 0.2–1.2)
MONOCYTES NFR BLD AUTO: 7.2 % (ref 5–12)
NEUTROPHILS # BLD AUTO: 9.99 10*3/MM3 (ref 1.9–8.1)
NEUTROPHILS NFR BLD AUTO: 72.1 % (ref 42.7–76)
NITRITE UR QL STRIP: NEGATIVE
NRBC BLD MANUAL-RTO: 0 /100 WBC (ref 0–0)
PH UR STRIP.AUTO: 5.5 [PH] (ref 5–8)
PLATELET # BLD AUTO: 196 10*3/MM3 (ref 140–500)
PMV BLD AUTO: 9.8 FL (ref 6–12)
POTASSIUM BLD-SCNC: 4.5 MMOL/L (ref 3.5–5.2)
PROT SERPL-MCNC: 7.1 G/DL (ref 6–8.5)
PROT UR QL STRIP: NEGATIVE
RBC # BLD AUTO: 4.5 10*6/MM3 (ref 3.9–5.2)
RBC # UR: ABNORMAL /HPF
REF LAB TEST METHOD: ABNORMAL
SODIUM BLD-SCNC: 133 MMOL/L (ref 136–145)
SP GR UR STRIP: 1.02 (ref 1–1.03)
SQUAMOUS #/AREA URNS HPF: ABNORMAL /HPF
TROPONIN T SERPL-MCNC: <0.01 NG/ML (ref 0–0.03)
UROBILINOGEN UR QL STRIP: ABNORMAL
WBC NRBC COR # BLD: 13.86 10*3/MM3 (ref 4.5–10.7)
WBC UR QL AUTO: ABNORMAL /HPF

## 2017-10-07 PROCEDURE — 80053 COMPREHEN METABOLIC PANEL: CPT | Performed by: FAMILY MEDICINE

## 2017-10-07 PROCEDURE — 25010000002 DIGOXIN PER 500 MCG: Performed by: FAMILY MEDICINE

## 2017-10-07 PROCEDURE — 81001 URINALYSIS AUTO W/SCOPE: CPT | Performed by: HOSPITALIST

## 2017-10-07 PROCEDURE — 80162 ASSAY OF DIGOXIN TOTAL: CPT | Performed by: FAMILY MEDICINE

## 2017-10-07 PROCEDURE — 70450 CT HEAD/BRAIN W/O DYE: CPT

## 2017-10-07 PROCEDURE — 82550 ASSAY OF CK (CPK): CPT | Performed by: FAMILY MEDICINE

## 2017-10-07 PROCEDURE — 93005 ELECTROCARDIOGRAM TRACING: CPT | Performed by: FAMILY MEDICINE

## 2017-10-07 PROCEDURE — 93010 ELECTROCARDIOGRAM REPORT: CPT | Performed by: INTERNAL MEDICINE

## 2017-10-07 PROCEDURE — 71010 HC CHEST PA OR AP: CPT

## 2017-10-07 PROCEDURE — 73552 X-RAY EXAM OF FEMUR 2/>: CPT

## 2017-10-07 PROCEDURE — 84484 ASSAY OF TROPONIN QUANT: CPT | Performed by: FAMILY MEDICINE

## 2017-10-07 PROCEDURE — 87086 URINE CULTURE/COLONY COUNT: CPT | Performed by: HOSPITALIST

## 2017-10-07 PROCEDURE — 99284 EMERGENCY DEPT VISIT MOD MDM: CPT

## 2017-10-07 PROCEDURE — 85025 COMPLETE CBC W/AUTO DIFF WBC: CPT | Performed by: FAMILY MEDICINE

## 2017-10-07 RX ORDER — ENALAPRIL MALEATE 20 MG/1
20 TABLET ORAL DAILY
Status: ON HOLD | COMMUNITY
Start: 2017-05-23 | End: 2017-12-06

## 2017-10-07 RX ORDER — ASPIRIN 81 MG/1
81 TABLET ORAL DAILY
Status: ON HOLD | COMMUNITY
End: 2017-10-12

## 2017-10-07 RX ORDER — METOPROLOL SUCCINATE 50 MG/1
50 TABLET, EXTENDED RELEASE ORAL DAILY
Status: DISCONTINUED | OUTPATIENT
Start: 2017-10-07 | End: 2017-10-09

## 2017-10-07 RX ORDER — CALCIUM CARBONATE 500(1250)
500 TABLET ORAL 2 TIMES DAILY
Status: DISCONTINUED | OUTPATIENT
Start: 2017-10-07 | End: 2017-10-12 | Stop reason: HOSPADM

## 2017-10-07 RX ORDER — DEXTROSE MONOHYDRATE 25 G/50ML
25 INJECTION, SOLUTION INTRAVENOUS
Status: DISCONTINUED | OUTPATIENT
Start: 2017-10-07 | End: 2017-10-12 | Stop reason: HOSPADM

## 2017-10-07 RX ORDER — NITROGLYCERIN 0.4 MG/1
0.4 TABLET SUBLINGUAL
Status: DISCONTINUED | OUTPATIENT
Start: 2017-10-07 | End: 2017-10-12 | Stop reason: HOSPADM

## 2017-10-07 RX ORDER — FUROSEMIDE 40 MG/1
40 TABLET ORAL 2 TIMES DAILY
Status: ON HOLD | COMMUNITY
Start: 2017-10-03 | End: 2017-12-06

## 2017-10-07 RX ORDER — CALCIUM CARBONATE 200(500)MG
2 TABLET,CHEWABLE ORAL 2 TIMES DAILY PRN
Status: DISCONTINUED | OUTPATIENT
Start: 2017-10-07 | End: 2017-10-12 | Stop reason: HOSPADM

## 2017-10-07 RX ORDER — BISACODYL 10 MG
10 SUPPOSITORY, RECTAL RECTAL DAILY PRN
Status: DISCONTINUED | OUTPATIENT
Start: 2017-10-07 | End: 2017-10-12 | Stop reason: HOSPADM

## 2017-10-07 RX ORDER — MORPHINE SULFATE 2 MG/ML
2 INJECTION, SOLUTION INTRAMUSCULAR; INTRAVENOUS
Status: DISCONTINUED | OUTPATIENT
Start: 2017-10-07 | End: 2017-10-09

## 2017-10-07 RX ORDER — SODIUM CHLORIDE 9 MG/ML
75 INJECTION, SOLUTION INTRAVENOUS CONTINUOUS
Status: DISCONTINUED | OUTPATIENT
Start: 2017-10-07 | End: 2017-10-09

## 2017-10-07 RX ORDER — DONEPEZIL HYDROCHLORIDE 10 MG/1
10 TABLET, FILM COATED ORAL NIGHTLY
Status: ON HOLD | COMMUNITY
End: 2021-11-18

## 2017-10-07 RX ORDER — HYDROCODONE BITARTRATE AND ACETAMINOPHEN 5; 325 MG/1; MG/1
1 TABLET ORAL EVERY 6 HOURS PRN
COMMUNITY
End: 2017-10-12 | Stop reason: HOSPADM

## 2017-10-07 RX ORDER — ONDANSETRON 2 MG/ML
4 INJECTION INTRAMUSCULAR; INTRAVENOUS EVERY 6 HOURS PRN
Status: DISCONTINUED | OUTPATIENT
Start: 2017-10-07 | End: 2017-10-12 | Stop reason: SDUPTHER

## 2017-10-07 RX ORDER — NALOXONE HCL 0.4 MG/ML
0.4 VIAL (ML) INJECTION
Status: DISCONTINUED | OUTPATIENT
Start: 2017-10-07 | End: 2017-10-09

## 2017-10-07 RX ORDER — ACETAMINOPHEN 325 MG/1
650 TABLET ORAL EVERY 6 HOURS PRN
Status: DISCONTINUED | OUTPATIENT
Start: 2017-10-07 | End: 2017-10-12 | Stop reason: HOSPADM

## 2017-10-07 RX ORDER — DIGOXIN 125 MCG
125 TABLET ORAL
Status: DISCONTINUED | OUTPATIENT
Start: 2017-10-08 | End: 2017-10-12 | Stop reason: HOSPADM

## 2017-10-07 RX ORDER — DONEPEZIL HYDROCHLORIDE 10 MG/1
10 TABLET, FILM COATED ORAL NIGHTLY
Status: DISCONTINUED | OUTPATIENT
Start: 2017-10-07 | End: 2017-10-12 | Stop reason: HOSPADM

## 2017-10-07 RX ORDER — NICOTINE POLACRILEX 4 MG
15 LOZENGE BUCCAL
Status: DISCONTINUED | OUTPATIENT
Start: 2017-10-07 | End: 2017-10-08

## 2017-10-07 RX ORDER — SENNA AND DOCUSATE SODIUM 50; 8.6 MG/1; MG/1
2 TABLET, FILM COATED ORAL 2 TIMES DAILY
Status: DISCONTINUED | OUTPATIENT
Start: 2017-10-07 | End: 2017-10-08

## 2017-10-07 RX ORDER — METOPROLOL SUCCINATE 50 MG/1
50 TABLET, EXTENDED RELEASE ORAL DAILY
COMMUNITY
Start: 2017-10-03 | End: 2017-12-02 | Stop reason: SDUPTHER

## 2017-10-07 RX ORDER — HYDROCODONE BITARTRATE AND ACETAMINOPHEN 5; 325 MG/1; MG/1
1 TABLET ORAL EVERY 4 HOURS PRN
Status: DISCONTINUED | OUTPATIENT
Start: 2017-10-07 | End: 2017-10-09 | Stop reason: SDUPTHER

## 2017-10-07 RX ORDER — DIGOXIN 0.25 MG/ML
125 INJECTION INTRAMUSCULAR; INTRAVENOUS ONCE
Status: COMPLETED | OUTPATIENT
Start: 2017-10-07 | End: 2017-10-07

## 2017-10-07 RX ORDER — DIGOXIN 0.25 MG/ML
250 INJECTION INTRAMUSCULAR; INTRAVENOUS ONCE
Status: DISCONTINUED | OUTPATIENT
Start: 2017-10-07 | End: 2017-10-07 | Stop reason: DRUGHIGH

## 2017-10-07 RX ORDER — SPIRONOLACTONE 25 MG/1
25 TABLET ORAL DAILY
Status: ON HOLD | COMMUNITY
Start: 2017-10-03 | End: 2017-12-06

## 2017-10-07 RX ORDER — DIGOXIN 125 MCG
125 TABLET ORAL
Status: ON HOLD | COMMUNITY
Start: 2017-05-23 | End: 2017-12-06

## 2017-10-07 RX ORDER — DIGOXIN 0.25 MG/ML
125 INJECTION INTRAMUSCULAR; INTRAVENOUS ONCE
Status: DISCONTINUED | OUTPATIENT
Start: 2017-10-07 | End: 2017-10-07 | Stop reason: SDUPTHER

## 2017-10-07 RX ADMIN — METOPROLOL SUCCINATE 50 MG: 50 TABLET, FILM COATED, EXTENDED RELEASE ORAL at 19:27

## 2017-10-07 RX ADMIN — SODIUM CHLORIDE 75 ML/HR: 9 INJECTION, SOLUTION INTRAVENOUS at 19:28

## 2017-10-07 RX ADMIN — HYDROCODONE BITARTRATE AND ACETAMINOPHEN 1 TABLET: 5; 325 TABLET ORAL at 16:34

## 2017-10-07 RX ADMIN — DIGOXIN 125 MCG: 250 INJECTION, SOLUTION INTRAMUSCULAR; INTRAVENOUS at 12:59

## 2017-10-07 NOTE — CONSULTS
Patient is a 89-year-old female who lives in assisted living who was found down on the floor with a right hip injury.  She does not remember the events of the fall and does not have a history of right hip pain or lower back pain.  She presented through the emergency room where x-rays revealed a fracture of the right hip and orthopedic consultation was placed.    PMH- past medical history was reviewed    PE- patient is lying comfortably in bed.  She is a good historian and is in no distress.  The patient's right lower extremity is slightly shortened and internally rotated.  There is a contusion over her right patella.  She is tender to palpation over the greater trochanter.  Distal pulses are palpable and sensation and motor are intact.    XR- multiple views of the right hip show a fracture of the femoral neck with mild displacement.  There is slight impaction and shortening.    DX- right femoral neck fracture    PLAN- I had a discussion with the patient and her daughter.  We will plan on performing a bipolar hemiarthroplasty of the right hip for her fracture.  The patient is medically cleared we will try to get this done tomorrow morning.  I discussed the risk of the surgery with the patient and her daughter.  Those risks include but are not limited to blood clots, infection, nerve injury, instability of the implant and dislocations,  Fracture of the femur, continued pain or need for further surgery.  We discussed the need for admission to rehabilitation Hospital for 2-3 weeks.  We also discussed postoperative DVT prophylaxis.  Questions were answered and informed consent was obtained.

## 2017-10-07 NOTE — H&P
Name: Jada Keith ADMIT: 10/7/2017   : 4/10/1926  PCP: Son Wall MD    MRN: [id number] LOS: 0 days   AGE/SEX: 89 y.o. female  ROOM:      Chief Complaint   Patient presents with   • Fall       Subjective   Ms. Keith is a 89 y.o. female who presents to [facility] complaining of right hip pain after sustaining a fall in her home. She lives in an independent living facility but has people checking on her at least twice daily. She remembers being in her bathroom at some point this morning and falling. It is unclear as to whether she passed out. She evidently laid on the ground until she eventually heard a knock at the door. She was able to crawl herself to the door and to open it. She complains of severe pain on her right side mainly in her hip. Worse with movement or trying to stand. She has no prior history of falling. She has mild dementia. Not a reliable historian. Denies any chest pain or shortness of breath. Family reports she is on diuretics for congestive heart failure. She's lost approximate 7 pounds over the last few months. She saw her cardiologist last week and her creatinine was elevated at that time as was her WBC. She's had no fever, chills etc. She denies dysuria or foul-smelling urine but does report increased frequency of urination.      History of Present Illness    Past Medical History:   Diagnosis Date   • CHF (congestive heart failure)    • Coronary artery disease    • GERD (gastroesophageal reflux disease)    • History of transfusion    • Hypertension    • Lymphoma      Past Surgical History:   Procedure Laterality Date   • BACK SURGERY     • CARDIAC CATHETERIZATION     • CHOLECYSTECTOMY     • HYSTERECTOMY     • TUMOR REMOVAL       Family History   Problem Relation Age of Onset   • Family history unknown: Yes     Social History   Substance Use Topics   • Smoking status: Never Smoker   • Smokeless tobacco: None   • Alcohol use No       (Not in a hospital  admission)  Allergies:  Sertraline; Risedronate sodium; and Sulfa antibiotics    Review of Systems   Constitutional: Positive for activity change, fatigue and unexpected weight change (7 pound weight loss).   HENT: Negative.    Eyes: Negative.    Respiratory: Negative.  Negative for chest tightness and shortness of breath.    Cardiovascular: Negative.  Negative for chest pain, palpitations and leg swelling.   Gastrointestinal: Negative.    Endocrine: Negative.    Genitourinary: Positive for frequency.   Musculoskeletal:        See above   Skin: Negative.    Neurological: Positive for weakness.   Hematological: Negative.    Psychiatric/Behavioral: Positive for confusion.        Objective    Vital Signs  Temp:  [98.7 °F (37.1 °C)] 98.7 °F (37.1 °C)  Heart Rate:  [68-80] 80  Resp:  [22-26] 22  BP: (133-142)/(48-60) 135/48  SpO2:  [92 %-97 %] 97 %  on   ;   O2 Device: room air  Body mass index is 24.03 kg/(m^2).    Physical Exam   Constitutional: She is oriented to person, place, and time. She appears well-developed and well-nourished. No distress.   HENT:   Head: Normocephalic and atraumatic.   Eyes: Conjunctivae and EOM are normal. No scleral icterus.   Neck: Normal range of motion. Neck supple. No tracheal deviation present.   Cardiovascular: Normal rate and regular rhythm.    No murmur heard.  Pulmonary/Chest: Effort normal and breath sounds normal. No respiratory distress. She has no wheezes. She has no rales.   Abdominal: Soft. Bowel sounds are normal. She exhibits no distension and no mass. There is no tenderness.   Musculoskeletal: She exhibits tenderness (With palpation or range of motion). She exhibits no edema or deformity.   Neurological: She is alert and oriented to person, place, and time. No cranial nerve deficit.   Skin: Skin is warm and dry. No rash noted. No erythema.   Psychiatric: She has a normal mood and affect. Her behavior is normal. Judgment and thought content normal.   Nursing note and vitals  reviewed.      Results Review:   I reviewed the patient's new clinical results.    Results from last 7 days  Lab Units 10/07/17  1338   WBC 10*3/mm3 13.86*   HEMOGLOBIN g/dL 13.2   PLATELETS 10*3/mm3 196     Results from last 7 days  Lab Units 10/07/17  1259   SODIUM mmol/L 133*   POTASSIUM mmol/L 4.5   CHLORIDE mmol/L 94*   CO2 mmol/L 25.3   BUN mg/dL 29*   CREATININE mg/dL 1.39*   GLUCOSE mg/dL 280*   ALBUMIN g/dL 3.70   BILIRUBIN mg/dL 0.6   ALK PHOS U/L 72   AST (SGOT) U/L 15   ALT (SGPT) U/L 12   Estimated Creatinine Clearance: 26.4 mL/min (by C-G formula based on Cr of 1.39).  Results from last 7 days  Lab Units 10/07/17  1259   TROPONIN T ng/mL <0.010           XR Femur 2 View Right   Preliminary Result   Linear nondisplaced subcapital left femoral neck fracture.          CT Head Without Contrast   Preliminary Result   There is no evidence for an acute intracranial abnormality.   Findings consistent with moderate chronic small vessel ischemic change   of the cerebral white matter are noted.       Radiation dose reduction techniques were utilized, including automated   exposure control and exposure modulation based on body size.                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        Plan:   Patient will require surgical repair of her hip fracture. Will ask cardiology to evaluate preoperatively. Most recent echocardiogram was in April with EF 22%. She appears compensated at this time and may be even slightly dry. Will hold diuretics and gently hydrate. Will prescribe morphine for pain. Orthopedic surgery to evaluate.    No clear explanation for elevation in WBC. Was elevated last week as well so not related to stress of fracture. Her glucose is quite elevated as well. Will check A1c. Accu-Cheks ordered.      I discussed the patients findings and my recommendations with patient and family.          Gilles Helms MD  [facility]  10/07/17  2:03 PM

## 2017-10-07 NOTE — ED NOTES
Pt fell this AM and landed on right hip. Pt given 50mg of fentanyl in route.     Claudine Magana RN  10/07/17 0284

## 2017-10-07 NOTE — ED NOTES
Pure wick placed by RN x1 and ERT x1. Pt has no c/o at this time and appears comfortable. Family at bedside and updated. Charge RN/ MD notified.     Martine Hager RN  10/07/17 0622

## 2017-10-07 NOTE — ED PROVIDER NOTES
EMERGENCY DEPARTMENT ENCOUNTER    CHIEF COMPLAINT  Chief Complaint: Fall  History given by: Daughter  History limited by: Dementia  Room Number: 12/12  PMD: Son Wall MD      HPI:  Pt is a 89 y.o. female with h/o dementia who presents via EMS after falling on her right side earlier this morning. The patient has experienced right hip pain since the fall which has worsened with movement or palpation. The daughter said the pt stood up from seated position and fell down. Assistance that comes to the pt's home in the morning called EMS when she didn't answer the door, and the pt crawled to open the door. The pt is unable to confirm the length of time she spent on the ground. No other complaints at this time.    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 04/30/2017   • Chronic combined systolic and diastolic heart failure 04/30/2017   • Collapsed vertebra, not elsewhere classified, thoracic region, initial encounter for fracture 04/30/2017   • Diastolic dysfunction 04/30/2017   • Diffuse non-Hodgkin's lymphoblastic lymphoma 04/30/2017   • Diverticulosis of intestine 04/30/2017   • Uterine leiomyoma 04/30/2017   • Gastroesophageal reflux disease 06/01/1994   • Memory loss 04/30/2017   • Moderate mitral regurgitation 04/30/2017   • Obstructive sleep apnea syndrome 04/30/2017   • Osteoporosis 04/30/2017   • Paroxysmal supraventricular tachycardia 04/30/2017   • Rectal disorder 04/30/2017   • Sinus tachycardia 10/01/2015   • Acute systolic (congestive) heart  failure 04/30/2017   • Acute pulmonary edema 04/30/2017   • Pedal edema 04/30/2017   • Acute respiratory failure with hypoxemia 04/30/2017   • Pleural effusion on right 04/30/2017   • Essential hypertension 04/30/2017     Resolved Ambulatory Problems     Diagnosis Date Noted   • No Resolved Ambulatory Problems     Past Medical History:   Diagnosis Date   • CHF (congestive heart failure)    • Coronary artery disease    • GERD (gastroesophageal reflux disease)    • History of transfusion    • Hypertension    • Lymphoma        PAST SURGICAL HISTORY  Past Surgical History:   Procedure Laterality Date   • BACK SURGERY     • CARDIAC CATHETERIZATION     • CHOLECYSTECTOMY     • HYSTERECTOMY     • TUMOR REMOVAL         FAMILY HISTORY  Family History   Problem Relation Age of Onset   • Family history unknown: Yes       SOCIAL HISTORY  Social History     Social History   • Marital status:      Spouse name: N/A   • Number of children: N/A   • Years of education: N/A     Occupational History   • Not on file.     Social History Main Topics   • Smoking status: Never Smoker   • Smokeless tobacco: Not on file   • Alcohol use No   • Drug use: No   • Sexual activity: Defer     Other Topics Concern   • Not on file     Social History Narrative    Exercises at [employer]       ALLERGIES  Sertraline; Risedronate sodium; and Sulfa antibiotics    REVIEW OF SYSTEMS  Review of Systems   Unable to perform ROS: Dementia       PHYSICAL EXAM  ED Triage Vitals   Temp Heart Rate Resp BP SpO2   10/07/17 1037 10/07/17 1037 10/07/17 1037 10/07/17 1037 10/07/17 1037   98.7 °F (37.1 °C) 68 26 142/60 96 %      Temp src Heart Rate Source Patient Position BP Location FiO2 (%)   -- 10/07/17 1055 10/07/17 1055 10/07/17 1055 --    Monitor Lying Right arm        Physical Exam   Constitutional: She is oriented to person, place, and time. No distress.   HENT:   Head: Normocephalic and atraumatic.   Neck: No muscular tenderness present.   Cardiovascular: Normal  rate and regular rhythm.    Pulmonary/Chest: Effort normal and breath sounds normal. No respiratory distress.   Abdominal: Soft. There is no tenderness.   Musculoskeletal:        Right hip: She exhibits decreased range of motion and tenderness. She exhibits no deformity.   Neurological: She is alert and oriented to person, place, and time.   Skin: No rash noted.   Nursing note and vitals reviewed.      LAB RESULTS  Lab Results (last 24 hours)     Procedure Component Value Units Date/Time    Comprehensive Metabolic Panel [[id number]]  (Abnormal) Collected:  10/07/17 1259    Specimen:  Blood Updated:  10/07/17 1339     Glucose 280 (H) mg/dL      BUN 29 (H) mg/dL      Creatinine 1.39 (H) mg/dL      Sodium 133 (L) mmol/L      Potassium 4.5 mmol/L      Chloride 94 (L) mmol/L      CO2 25.3 mmol/L      Calcium 9.1 mg/dL      Total Protein 7.1 g/dL      Albumin 3.70 g/dL      ALT (SGPT) 12 U/L      AST (SGOT) 15 U/L      Alkaline Phosphatase 72 U/L      Total Bilirubin 0.6 mg/dL      eGFR Non African Amer 36 (L) mL/min/1.73      Globulin 3.4 gm/dL      A/G Ratio 1.1 g/dL      BUN/Creatinine Ratio 20.9     Anion Gap 13.7 mmol/L     Narrative:       The MDRD GFR formula is only valid for adults with stable renal function between ages 18 and 70.    Troponin [[id number]]  (Normal) Collected:  10/07/17 1259    Specimen:  Blood Updated:  10/07/17 1339     Troponin T <0.010 ng/mL     Narrative:       Troponin T Reference Ranges:  Less than 0.03 ng/mL:    Negative for AMI  0.03 to 0.09 ng/mL:      Indeterminant for AMI  Greater than 0.09 ng/mL: Positive for AMI    CK [[id number]] Collected:  10/07/17 1259    Specimen:  Blood Updated:  10/07/17 1415    Digoxin Level [[id number]] Collected:  10/07/17 1259    Specimen:  Blood Updated:  10/07/17 1415    CBC & Differential [[id number]] Collected:  10/07/17 1338    Specimen:  Blood Updated:  10/07/17 1356    Narrative:       The following orders were created for panel order CBC &  Differential.  Procedure                               Abnormality         Status                     ---------                               -----------         ------                     Scan Slide[[id number]]                                                                  CBC Auto Differential[[id number]]        Abnormal            Final result                 Please view results for these tests on the individual orders.    CBC Auto Differential [[id number]]  (Abnormal) Collected:  10/07/17 1338    Specimen:  Blood Updated:  10/07/17 1356     WBC 13.86 (H) 10*3/mm3      RBC 4.50 10*6/mm3      Hemoglobin 13.2 g/dL      Hematocrit 40.1 %      MCV 89.1 fL      MCH 29.3 pg      MCHC 32.9 g/dL      RDW 14.2 (H) %      RDW-SD 46.3 fl      MPV 9.8 fL      Platelets 196 10*3/mm3      Neutrophil % 72.1 %      Lymphocyte % 17.6 (L) %      Monocyte % 7.2 %      Eosinophil % 2.2 %      Basophil % 0.3 %      Immature Grans % 0.6 (H) %      Neutrophils, Absolute 9.99 (H) 10*3/mm3      Lymphocytes, Absolute 2.44 10*3/mm3      Monocytes, Absolute 1.00 10*3/mm3      Eosinophils, Absolute 0.31 10*3/mm3      Basophils, Absolute 0.04 10*3/mm3      Immature Grans, Absolute 0.08 (H) 10*3/mm3      nRBC 0.0 /100 WBC           I ordered the above labs and reviewed the results    RADIOLOGY  XR Femur 2 View Right   Preliminary Result   Linear nondisplaced subcapital left femoral neck fracture.          CT Head Without Contrast   Preliminary Result   There is no evidence for an acute intracranial abnormality.   Findings consistent with moderate chronic small vessel ischemic change   of the cerebral white matter are noted.       Radiation dose reduction techniques were utilized, including automated   exposure control and exposure modulation based on body size.                   I ordered the above noted radiological studies. Interpreted by radiologist. Reviewed by me in PACS.       PROCEDURES  Procedures    EKG           EKG time:  1211  Rhythm/Rate: Paced Rhythm   P waves and MN: Normal  QRS, axis: LBBB   ST and T waves: Non specific ST wave changes      Interpreted Contemporaneously by me, independently viewed  Unchanged compared to prior 5/2017, except the rhythm is now paced       PROGRESS AND CONSULTS  ED Course     1121  Ordered Labs, EKG, XR R Femur and CT head for further evaluation.    1336  Placed call out to Ortho and A.     1359  Discussed case with Dr England (Ortho). Reviewed history, exam, results and treatments.  Discussed concerns and plan of care.     1401  Discussed case with Dr Helms. Reviewed history, exam, results and treatments.  Discussed concerns and plan of care. Dr Helms accepts pt to be admitted.      MEDICAL DECISION MAKING  Results were reviewed/discussed with the patient and they were also made aware of online access. Pt also made aware that some labs, such as cultures, will not be resulted during ER visit and follow up with PMD is necessary.     MDM  Number of Diagnoses or Management Options     Amount and/or Complexity of Data Reviewed  Clinical lab tests: ordered and reviewed  Tests in the radiology section of CPT®: reviewed and ordered (XR L Femur - Linear nondisplaced subcapital left femoral neck fracture.  )  Tests in the medicine section of CPT®: reviewed and ordered (See EKG)  Discussion of test results with the performing providers: yes (CT head -There is no evidence for an acute intracranial abnormality. Findings consistent with moderate chronic small vessel ischemic change of the cerebral white matter are noted.  )  Discuss the patient with other providers: yes (Discussed case with Dr England (Ortho). Discussed case with Dr Helms)           DIAGNOSIS  Final diagnoses:   Fall, initial encounter   Closed subcapital fracture of right femur, initial encounter       DISPOSITION  ADMISSION    Discussed treatment plan and reason for admission with pt/family and admitting physician.  Pt/family voiced  understanding of the plan for admission for further testing/treatment as needed.       Latest Documented Vital Signs:  As of 2:20 PM  BP- 143/59 HR- 76 Temp- 98.7 °F (37.1 °C) O2 sat- 98%    --  Documentation assistance provided by vanessa Robertson and Rishi Watson for Dr. Garcia.  Information recorded by the scribe was done at my direction and has been verified and validated by me.       Kathy Robertson  10/07/17 1159       Rishi Watson  10/07/17 1420       Blaise Garcia MD  10/07/17 4290

## 2017-10-07 NOTE — ED NOTES
Attempt to call report to [facility] [address] unsuccessful. Pt to be roomed on ortho floor instead per CCP RN.     Martine Hager, JOSIE  10/07/17 6681

## 2017-10-08 ENCOUNTER — APPOINTMENT (OUTPATIENT)
Dept: GENERAL RADIOLOGY | Facility: HOSPITAL | Age: 82
End: 2017-10-08

## 2017-10-08 ENCOUNTER — ANESTHESIA EVENT (OUTPATIENT)
Dept: PERIOP | Facility: HOSPITAL | Age: 82
End: 2017-10-08

## 2017-10-08 ENCOUNTER — ANESTHESIA (OUTPATIENT)
Dept: PERIOP | Facility: HOSPITAL | Age: 82
End: 2017-10-08

## 2017-10-08 PROBLEM — E11.9 DM TYPE 2 (DIABETES MELLITUS, TYPE 2) (HCC): Status: ACTIVE | Noted: 2017-10-08

## 2017-10-08 LAB
ANION GAP SERPL CALCULATED.3IONS-SCNC: 10 MMOL/L
BUN BLD-MCNC: 21 MG/DL (ref 8–23)
BUN/CREAT SERPL: 20.4 (ref 7–25)
CALCIUM SPEC-SCNC: 9 MG/DL (ref 8.2–9.6)
CHLORIDE SERPL-SCNC: 96 MMOL/L (ref 98–107)
CO2 SERPL-SCNC: 27 MMOL/L (ref 22–29)
CREAT BLD-MCNC: 1.03 MG/DL (ref 0.57–1)
DEPRECATED RDW RBC AUTO: 46.6 FL (ref 37–54)
ERYTHROCYTE [DISTWIDTH] IN BLOOD BY AUTOMATED COUNT: 14.1 % (ref 11.7–13)
GFR SERPL CREATININE-BSD FRML MDRD: 50 ML/MIN/1.73
GLUCOSE BLD-MCNC: 285 MG/DL (ref 65–99)
GLUCOSE BLDC GLUCOMTR-MCNC: 251 MG/DL (ref 70–130)
GLUCOSE BLDC GLUCOMTR-MCNC: 336 MG/DL (ref 70–130)
GLUCOSE BLDC GLUCOMTR-MCNC: 348 MG/DL (ref 70–130)
HBA1C MFR BLD: 10.95 % (ref 4.8–5.6)
HCT VFR BLD AUTO: 37.6 % (ref 35.6–45.5)
HGB BLD-MCNC: 12.2 G/DL (ref 11.9–15.5)
MCH RBC QN AUTO: 29.3 PG (ref 26.9–32)
MCHC RBC AUTO-ENTMCNC: 32.4 G/DL (ref 32.4–36.3)
MCV RBC AUTO: 90.4 FL (ref 80.5–98.2)
PLATELET # BLD AUTO: 203 10*3/MM3 (ref 140–500)
PMV BLD AUTO: 9.9 FL (ref 6–12)
POTASSIUM BLD-SCNC: 4.6 MMOL/L (ref 3.5–5.2)
RBC # BLD AUTO: 4.16 10*6/MM3 (ref 3.9–5.2)
SODIUM BLD-SCNC: 133 MMOL/L (ref 136–145)
WBC NRBC COR # BLD: 14.69 10*3/MM3 (ref 4.5–10.7)

## 2017-10-08 PROCEDURE — 25010000002 PROPOFOL 10 MG/ML EMULSION: Performed by: ANESTHESIOLOGY

## 2017-10-08 PROCEDURE — 85027 COMPLETE CBC AUTOMATED: CPT | Performed by: HOSPITALIST

## 2017-10-08 PROCEDURE — 73501 X-RAY EXAM HIP UNI 1 VIEW: CPT

## 2017-10-08 PROCEDURE — 82962 GLUCOSE BLOOD TEST: CPT

## 2017-10-08 PROCEDURE — 25010000002 CEFTRIAXONE PER 250 MG: Performed by: HOSPITALIST

## 2017-10-08 PROCEDURE — 83036 HEMOGLOBIN GLYCOSYLATED A1C: CPT | Performed by: HOSPITALIST

## 2017-10-08 PROCEDURE — 0SRR0J9 REPLACEMENT OF RIGHT HIP JOINT, FEMORAL SURFACE WITH SYNTHETIC SUBSTITUTE, CEMENTED, OPEN APPROACH: ICD-10-PCS | Performed by: ORTHOPAEDIC SURGERY

## 2017-10-08 PROCEDURE — 99222 1ST HOSP IP/OBS MODERATE 55: CPT | Performed by: INTERNAL MEDICINE

## 2017-10-08 PROCEDURE — 25010000002 FENTANYL CITRATE (PF) 100 MCG/2ML SOLUTION: Performed by: ANESTHESIOLOGY

## 2017-10-08 PROCEDURE — C1713 ANCHOR/SCREW BN/BN,TIS/BN: HCPCS | Performed by: ORTHOPAEDIC SURGERY

## 2017-10-08 PROCEDURE — 63710000001 INSULIN ASPART PER 5 UNITS: Performed by: HOSPITALIST

## 2017-10-08 PROCEDURE — 25010000002 MIDAZOLAM PER 1 MG: Performed by: ANESTHESIOLOGY

## 2017-10-08 PROCEDURE — 94799 UNLISTED PULMONARY SVC/PX: CPT

## 2017-10-08 PROCEDURE — 25010000002 PHENYLEPHRINE PER 1 ML: Performed by: ANESTHESIOLOGY

## 2017-10-08 PROCEDURE — C1776 JOINT DEVICE (IMPLANTABLE): HCPCS | Performed by: ORTHOPAEDIC SURGERY

## 2017-10-08 PROCEDURE — 80048 BASIC METABOLIC PNL TOTAL CA: CPT | Performed by: HOSPITALIST

## 2017-10-08 PROCEDURE — 25010000002 SUCCINYLCHOLINE PER 20 MG: Performed by: ANESTHESIOLOGY

## 2017-10-08 DEVICE — CAP PRT HIP BIPOL FX: Type: IMPLANTABLE DEVICE | Status: FUNCTIONAL

## 2017-10-08 DEVICE — SHLL VERSYS M/BIPOL MTL OD 46MM: Type: IMPLANTABLE DEVICE | Status: FUNCTIONAL

## 2017-10-08 DEVICE — STEM FEM VERSYS/HERITAGE PRI STD 11X120MM: Type: IMPLANTABLE DEVICE | Status: FUNCTIONAL

## 2017-10-08 DEVICE — IMPLANTABLE DEVICE: Type: IMPLANTABLE DEVICE | Status: FUNCTIONAL

## 2017-10-08 DEVICE — CMT BONE PALACOS 120001: Type: IMPLANTABLE DEVICE | Status: FUNCTIONAL

## 2017-10-08 DEVICE — HD FEM/HIP VERSYS COCR 12/14TPR 28MM PLS0MM: Type: IMPLANTABLE DEVICE | Status: FUNCTIONAL

## 2017-10-08 RX ORDER — DEXTROSE MONOHYDRATE 25 G/50ML
25 INJECTION, SOLUTION INTRAVENOUS
Status: DISCONTINUED | OUTPATIENT
Start: 2017-10-08 | End: 2017-10-08

## 2017-10-08 RX ORDER — PROPOFOL 10 MG/ML
VIAL (ML) INTRAVENOUS AS NEEDED
Status: DISCONTINUED | OUTPATIENT
Start: 2017-10-08 | End: 2017-10-08 | Stop reason: SURG

## 2017-10-08 RX ORDER — HYDROCODONE BITARTRATE AND ACETAMINOPHEN 5; 325 MG/1; MG/1
1 TABLET ORAL EVERY 6 HOURS PRN
Status: DISCONTINUED | OUTPATIENT
Start: 2017-10-08 | End: 2017-10-12 | Stop reason: HOSPADM

## 2017-10-08 RX ORDER — NALOXONE HCL 0.4 MG/ML
0.4 VIAL (ML) INJECTION
Status: DISCONTINUED | OUTPATIENT
Start: 2017-10-08 | End: 2017-10-09

## 2017-10-08 RX ORDER — CEFTRIAXONE SODIUM 1 G/50ML
1 INJECTION, SOLUTION INTRAVENOUS EVERY 24 HOURS
Status: DISCONTINUED | OUTPATIENT
Start: 2017-10-08 | End: 2017-10-09

## 2017-10-08 RX ORDER — ASPIRIN 325 MG
325 TABLET ORAL DAILY
Status: DISCONTINUED | OUTPATIENT
Start: 2017-10-09 | End: 2017-10-12 | Stop reason: HOSPADM

## 2017-10-08 RX ORDER — PROMETHAZINE HYDROCHLORIDE 25 MG/1
25 SUPPOSITORY RECTAL ONCE AS NEEDED
Status: DISCONTINUED | OUTPATIENT
Start: 2017-10-08 | End: 2017-10-08 | Stop reason: HOSPADM

## 2017-10-08 RX ORDER — HYDROCODONE BITARTRATE AND ACETAMINOPHEN 5; 325 MG/1; MG/1
2 TABLET ORAL EVERY 6 HOURS PRN
Status: DISCONTINUED | OUTPATIENT
Start: 2017-10-08 | End: 2017-10-12 | Stop reason: HOSPADM

## 2017-10-08 RX ORDER — LIDOCAINE HYDROCHLORIDE 20 MG/ML
INJECTION, SOLUTION INFILTRATION; PERINEURAL AS NEEDED
Status: DISCONTINUED | OUTPATIENT
Start: 2017-10-08 | End: 2017-10-08 | Stop reason: SURG

## 2017-10-08 RX ORDER — HYDROMORPHONE HYDROCHLORIDE 1 MG/ML
0.5 INJECTION, SOLUTION INTRAMUSCULAR; INTRAVENOUS; SUBCUTANEOUS
Status: DISCONTINUED | OUTPATIENT
Start: 2017-10-08 | End: 2017-10-08 | Stop reason: HOSPADM

## 2017-10-08 RX ORDER — PROMETHAZINE HYDROCHLORIDE 25 MG/1
12.5 TABLET ORAL ONCE AS NEEDED
Status: DISCONTINUED | OUTPATIENT
Start: 2017-10-08 | End: 2017-10-08 | Stop reason: HOSPADM

## 2017-10-08 RX ORDER — ROCURONIUM BROMIDE 10 MG/ML
INJECTION, SOLUTION INTRAVENOUS AS NEEDED
Status: DISCONTINUED | OUTPATIENT
Start: 2017-10-08 | End: 2017-10-08 | Stop reason: SURG

## 2017-10-08 RX ORDER — GLYCOPYRROLATE 0.2 MG/ML
INJECTION INTRAMUSCULAR; INTRAVENOUS AS NEEDED
Status: DISCONTINUED | OUTPATIENT
Start: 2017-10-08 | End: 2017-10-08 | Stop reason: SURG

## 2017-10-08 RX ORDER — MIDAZOLAM HYDROCHLORIDE 1 MG/ML
0.5 INJECTION INTRAMUSCULAR; INTRAVENOUS
Status: DISCONTINUED | OUTPATIENT
Start: 2017-10-08 | End: 2017-10-08 | Stop reason: HOSPADM

## 2017-10-08 RX ORDER — FENTANYL CITRATE 50 UG/ML
50 INJECTION, SOLUTION INTRAMUSCULAR; INTRAVENOUS
Status: DISCONTINUED | OUTPATIENT
Start: 2017-10-08 | End: 2017-10-08 | Stop reason: HOSPADM

## 2017-10-08 RX ORDER — MORPHINE SULFATE 2 MG/ML
1 INJECTION, SOLUTION INTRAMUSCULAR; INTRAVENOUS EVERY 4 HOURS PRN
Status: DISCONTINUED | OUTPATIENT
Start: 2017-10-08 | End: 2017-10-09

## 2017-10-08 RX ORDER — PROMETHAZINE HYDROCHLORIDE 25 MG/1
25 TABLET ORAL ONCE AS NEEDED
Status: DISCONTINUED | OUTPATIENT
Start: 2017-10-08 | End: 2017-10-08 | Stop reason: HOSPADM

## 2017-10-08 RX ORDER — PROMETHAZINE HYDROCHLORIDE 25 MG/ML
12.5 INJECTION, SOLUTION INTRAMUSCULAR; INTRAVENOUS ONCE AS NEEDED
Status: DISCONTINUED | OUTPATIENT
Start: 2017-10-08 | End: 2017-10-08 | Stop reason: HOSPADM

## 2017-10-08 RX ORDER — OXYCODONE HYDROCHLORIDE AND ACETAMINOPHEN 5; 325 MG/1; MG/1
1 TABLET ORAL EVERY 4 HOURS PRN
Status: DISCONTINUED | OUTPATIENT
Start: 2017-10-08 | End: 2017-10-08

## 2017-10-08 RX ORDER — SODIUM CHLORIDE, SODIUM LACTATE, POTASSIUM CHLORIDE, CALCIUM CHLORIDE 600; 310; 30; 20 MG/100ML; MG/100ML; MG/100ML; MG/100ML
INJECTION, SOLUTION INTRAVENOUS CONTINUOUS PRN
Status: DISCONTINUED | OUTPATIENT
Start: 2017-10-08 | End: 2017-10-08 | Stop reason: SURG

## 2017-10-08 RX ORDER — SENNA AND DOCUSATE SODIUM 50; 8.6 MG/1; MG/1
2 TABLET, FILM COATED ORAL 2 TIMES DAILY PRN
Status: DISCONTINUED | OUTPATIENT
Start: 2017-10-08 | End: 2017-10-12 | Stop reason: HOSPADM

## 2017-10-08 RX ORDER — NICOTINE POLACRILEX 4 MG
15 LOZENGE BUCCAL
Status: DISCONTINUED | OUTPATIENT
Start: 2017-10-08 | End: 2017-10-08

## 2017-10-08 RX ORDER — OXYCODONE AND ACETAMINOPHEN 7.5; 325 MG/1; MG/1
1 TABLET ORAL ONCE AS NEEDED
Status: DISCONTINUED | OUTPATIENT
Start: 2017-10-08 | End: 2017-10-08 | Stop reason: HOSPADM

## 2017-10-08 RX ORDER — EPHEDRINE SULFATE 50 MG/ML
5 INJECTION, SOLUTION INTRAVENOUS ONCE AS NEEDED
Status: DISCONTINUED | OUTPATIENT
Start: 2017-10-08 | End: 2017-10-08 | Stop reason: HOSPADM

## 2017-10-08 RX ORDER — FAMOTIDINE 10 MG/ML
20 INJECTION, SOLUTION INTRAVENOUS ONCE
Status: COMPLETED | OUTPATIENT
Start: 2017-10-08 | End: 2017-10-08

## 2017-10-08 RX ORDER — NALOXONE HCL 0.4 MG/ML
0.2 VIAL (ML) INJECTION AS NEEDED
Status: DISCONTINUED | OUTPATIENT
Start: 2017-10-08 | End: 2017-10-08 | Stop reason: HOSPADM

## 2017-10-08 RX ORDER — FENTANYL CITRATE 50 UG/ML
INJECTION, SOLUTION INTRAMUSCULAR; INTRAVENOUS
Status: DISPENSED
Start: 2017-10-08 | End: 2017-10-08

## 2017-10-08 RX ORDER — ONDANSETRON 2 MG/ML
4 INJECTION INTRAMUSCULAR; INTRAVENOUS EVERY 6 HOURS PRN
Status: DISCONTINUED | OUTPATIENT
Start: 2017-10-08 | End: 2017-10-12 | Stop reason: HOSPADM

## 2017-10-08 RX ORDER — ONDANSETRON 2 MG/ML
4 INJECTION INTRAMUSCULAR; INTRAVENOUS ONCE AS NEEDED
Status: DISCONTINUED | OUTPATIENT
Start: 2017-10-08 | End: 2017-10-08 | Stop reason: HOSPADM

## 2017-10-08 RX ORDER — DIPHENHYDRAMINE HYDROCHLORIDE 50 MG/ML
12.5 INJECTION INTRAMUSCULAR; INTRAVENOUS
Status: DISCONTINUED | OUTPATIENT
Start: 2017-10-08 | End: 2017-10-08 | Stop reason: HOSPADM

## 2017-10-08 RX ORDER — HYDRALAZINE HYDROCHLORIDE 20 MG/ML
5 INJECTION INTRAMUSCULAR; INTRAVENOUS
Status: DISCONTINUED | OUTPATIENT
Start: 2017-10-08 | End: 2017-10-08 | Stop reason: HOSPADM

## 2017-10-08 RX ORDER — LABETALOL HYDROCHLORIDE 5 MG/ML
5 INJECTION, SOLUTION INTRAVENOUS
Status: DISCONTINUED | OUTPATIENT
Start: 2017-10-08 | End: 2017-10-08 | Stop reason: HOSPADM

## 2017-10-08 RX ORDER — POLYETHYLENE GLYCOL 3350 17 G/17G
17 POWDER, FOR SOLUTION ORAL DAILY
Status: DISCONTINUED | OUTPATIENT
Start: 2017-10-08 | End: 2017-10-12 | Stop reason: HOSPADM

## 2017-10-08 RX ORDER — SUCCINYLCHOLINE CHLORIDE 20 MG/ML
INJECTION INTRAMUSCULAR; INTRAVENOUS AS NEEDED
Status: DISCONTINUED | OUTPATIENT
Start: 2017-10-08 | End: 2017-10-08 | Stop reason: SURG

## 2017-10-08 RX ORDER — SODIUM CHLORIDE 9 MG/ML
9 INJECTION, SOLUTION INTRAVENOUS CONTINUOUS PRN
Status: DISCONTINUED | OUTPATIENT
Start: 2017-10-08 | End: 2017-10-08 | Stop reason: HOSPADM

## 2017-10-08 RX ORDER — HYDROCODONE BITARTRATE AND ACETAMINOPHEN 7.5; 325 MG/1; MG/1
1 TABLET ORAL ONCE AS NEEDED
Status: DISCONTINUED | OUTPATIENT
Start: 2017-10-08 | End: 2017-10-08 | Stop reason: HOSPADM

## 2017-10-08 RX ORDER — PROMETHAZINE HYDROCHLORIDE 25 MG/ML
12.5 INJECTION, SOLUTION INTRAMUSCULAR; INTRAVENOUS EVERY 4 HOURS PRN
Status: DISCONTINUED | OUTPATIENT
Start: 2017-10-08 | End: 2017-10-09

## 2017-10-08 RX ORDER — FLUMAZENIL 0.1 MG/ML
0.2 INJECTION INTRAVENOUS AS NEEDED
Status: DISCONTINUED | OUTPATIENT
Start: 2017-10-08 | End: 2017-10-08 | Stop reason: HOSPADM

## 2017-10-08 RX ORDER — SODIUM CHLORIDE 0.9 % (FLUSH) 0.9 %
1-10 SYRINGE (ML) INJECTION AS NEEDED
Status: DISCONTINUED | OUTPATIENT
Start: 2017-10-08 | End: 2017-10-08 | Stop reason: HOSPADM

## 2017-10-08 RX ADMIN — METOPROLOL SUCCINATE 50 MG: 50 TABLET, FILM COATED, EXTENDED RELEASE ORAL at 09:59

## 2017-10-08 RX ADMIN — INSULIN ASPART 7 UNITS: 100 INJECTION, SOLUTION INTRAVENOUS; SUBCUTANEOUS at 18:37

## 2017-10-08 RX ADMIN — POLYETHYLENE GLYCOL 3350 17 G: 17 POWDER, FOR SOLUTION ORAL at 18:38

## 2017-10-08 RX ADMIN — HYDROCODONE BITARTRATE AND ACETAMINOPHEN 2 TABLET: 5; 325 TABLET ORAL at 15:58

## 2017-10-08 RX ADMIN — CEFTRIAXONE SODIUM 1 G: 1 INJECTION, SOLUTION INTRAVENOUS at 10:19

## 2017-10-08 RX ADMIN — HYDROCODONE BITARTRATE AND ACETAMINOPHEN 2 TABLET: 5; 325 TABLET ORAL at 23:02

## 2017-10-08 RX ADMIN — ROCURONIUM BROMIDE 5 MG: 10 INJECTION INTRAVENOUS at 11:29

## 2017-10-08 RX ADMIN — FAMOTIDINE 20 MG: 10 INJECTION INTRAVENOUS at 10:33

## 2017-10-08 RX ADMIN — PHENYLEPHRINE HYDROCHLORIDE 100 MCG: 10 INJECTION INTRAVENOUS at 12:55

## 2017-10-08 RX ADMIN — SUGAMMADEX 200 MG: 100 INJECTION, SOLUTION INTRAVENOUS at 13:05

## 2017-10-08 RX ADMIN — DONEPEZIL HYDROCHLORIDE 10 MG: 10 TABLET, FILM COATED ORAL at 20:45

## 2017-10-08 RX ADMIN — PROPOFOL 100 MG: 10 INJECTION, EMULSION INTRAVENOUS at 11:30

## 2017-10-08 RX ADMIN — SODIUM CHLORIDE, POTASSIUM CHLORIDE, SODIUM LACTATE AND CALCIUM CHLORIDE: 600; 310; 30; 20 INJECTION, SOLUTION INTRAVENOUS at 11:24

## 2017-10-08 RX ADMIN — SODIUM CHLORIDE 9 ML/HR: 9 INJECTION, SOLUTION INTRAVENOUS at 10:33

## 2017-10-08 RX ADMIN — INSULIN ASPART 7 UNITS: 100 INJECTION, SOLUTION INTRAVENOUS; SUBCUTANEOUS at 20:45

## 2017-10-08 RX ADMIN — PHENYLEPHRINE HYDROCHLORIDE 100 MCG: 10 INJECTION INTRAVENOUS at 12:59

## 2017-10-08 RX ADMIN — PHENYLEPHRINE HYDROCHLORIDE 100 MCG: 10 INJECTION INTRAVENOUS at 11:50

## 2017-10-08 RX ADMIN — PHENYLEPHRINE HYDROCHLORIDE 100 MCG: 10 INJECTION INTRAVENOUS at 12:38

## 2017-10-08 RX ADMIN — FENTANYL CITRATE 25 MCG: 50 INJECTION INTRAMUSCULAR; INTRAVENOUS at 10:37

## 2017-10-08 RX ADMIN — LIDOCAINE HYDROCHLORIDE 100 MG: 20 INJECTION, SOLUTION INFILTRATION; PERINEURAL at 11:30

## 2017-10-08 RX ADMIN — PHENYLEPHRINE HYDROCHLORIDE 100 MCG: 10 INJECTION INTRAVENOUS at 11:40

## 2017-10-08 RX ADMIN — ROCURONIUM BROMIDE 25 MG: 10 INJECTION INTRAVENOUS at 11:40

## 2017-10-08 RX ADMIN — SODIUM CHLORIDE 75 ML/HR: 9 INJECTION, SOLUTION INTRAVENOUS at 19:44

## 2017-10-08 RX ADMIN — SUCCINYLCHOLINE CHLORIDE 140 MG: 20 INJECTION, SOLUTION INTRAMUSCULAR; INTRAVENOUS; PARENTERAL at 11:31

## 2017-10-08 RX ADMIN — GLYCOPYRROLATE 0.2 MG: 0.2 INJECTION INTRAMUSCULAR; INTRAVENOUS at 13:05

## 2017-10-08 RX ADMIN — MIDAZOLAM 0.5 MG: 1 INJECTION INTRAMUSCULAR; INTRAVENOUS at 10:36

## 2017-10-08 NOTE — ANESTHESIA PREPROCEDURE EVALUATION
Anesthesia Evaluation     Patient summary reviewed and Nursing notes reviewed   NPO Solid Status: > 8 hours       Airway   Mallampati: II  TM distance: >3 FB  Neck ROM: full  Dental - normal exam     Pulmonary - normal exam    breath sounds clear to auscultation  (+) sleep apnea on CPAP,   Cardiovascular - normal exam    Rhythm: regular  Rate: normal    (+) hypertension, valvular problems/murmurs MR and AS, CAD, dysrhythmias (Paroxysmal supraventricular tachycardia) Tachycardia, CHF (Estimated EF = 22%), ARCHER,   (-) angina      Neuro/Psych- negative ROS  GI/Hepatic/Renal/Endo    (+)  GERD, renal disease ARF,     Musculoskeletal     (+) back pain,   Abdominal    Substance History - negative use     OB/GYN negative ob/gyn ROS         Other      history of cancer (Diffuse non-Hodgkin's lymphoblastic lymphoma)                                    Anesthesia Plan    ASA 4     general     intravenous induction   Anesthetic plan and risks discussed with patient.

## 2017-10-08 NOTE — PLAN OF CARE
Problem: Patient Care Overview (Adult)  Goal: Plan of Care Review  Outcome: Ongoing (interventions implemented as appropriate)    10/08/17 1018   Coping/Psychosocial Response Interventions   Plan Of Care Reviewed With patient   Patient Care Overview   Progress no change   Outcome Evaluation   Outcome Summary/Follow up Plan preparing for surgery         Problem: Perioperative Period (Adult)  Goal: Signs and Symptoms of Listed Potential Problems Will be Absent or Manageable (Perioperative Period)  Outcome: Ongoing (interventions implemented as appropriate)    10/08/17 1018   Perioperative Period   Problems Assessed (Perioperative Period) pain;infection;situational response   Problems Present (Perioperative Period) infection

## 2017-10-08 NOTE — ANESTHESIA POSTPROCEDURE EVALUATION
Patient: Jada Keith    Procedure Summary     Date Anesthesia Start Anesthesia Stop Room / Location    10/08/17 1124 1322 [facility] / [facility]       Procedure Diagnosis Surgeon Provider    CEMENTED BIPOLAR (Right Hip) No diagnosis on file. MD Chavez Lux MD          Anesthesia Type: general  Last vitals  BP   140/59 (10/08/17 1355)    Temp   36.7 °C (98.1 °F) (10/08/17 1322)    Pulse   93 (10/08/17 1355)   Resp   14 (10/08/17 1355)    SpO2   100 % (10/08/17 1355)      Post Anesthesia Care and Evaluation    Patient location during evaluation: PACU  Patient participation: complete - patient participated  Level of consciousness: awake  Pain management: adequate  Airway patency: patent  Anesthetic complications: No anesthetic complications    Cardiovascular status: acceptable  Respiratory status: acceptable  Hydration status: acceptable

## 2017-10-08 NOTE — PLAN OF CARE
Problem: Patient Care Overview (Adult)  Goal: Plan of Care Review  Outcome: Ongoing (interventions implemented as appropriate)    10/08/17 0627   Coping/Psychosocial Response Interventions   Plan Of Care Reviewed With patient   Patient Care Overview   Progress no change   Outcome Evaluation   Outcome Summary/Follow up Plan patient alert follows commands forgetful, able to use a fracture bedpan to eliminate. no prn pain or nausea meds given iv fludis infusing. no acute distress noted will continue to monitor       Goal: Adult Individualization and Mutuality  Outcome: Ongoing (interventions implemented as appropriate)  Goal: Discharge Needs Assessment  Outcome: Ongoing (interventions implemented as appropriate)    Problem: Fractured Hip (Adult)  Goal: Signs and Symptoms of Listed Potential Problems Will be Absent or Manageable (Fractured Hip)  Outcome: Ongoing (interventions implemented as appropriate)    Problem: Fall Risk (Adult)  Goal: Identify Related Risk Factors and Signs and Symptoms  Outcome: Ongoing (interventions implemented as appropriate)  Goal: Absence of Falls  Outcome: Ongoing (interventions implemented as appropriate)

## 2017-10-08 NOTE — PROGRESS NOTES
Name: Jada Keith ADMIT: 10/7/2017   : 4/10/1926  PCP: Son Wall MD    MRN: [id number] LOS: 1 days   AGE/SEX: 89 y.o. female  ROOM: [facility] OR/MAIN OR         Subjective       Subjective  Seen postop. Some right hip pain. Denies chest pain, shortness of breath. Discussed with RN, family x2      Objective      Vital Signs  Temp:  [97.5 °F (36.4 °C)-98.7 °F (37.1 °C)] 98.1 °F (36.7 °C)  Heart Rate:  [] 105  Resp:  [14-18] 14  BP: (121-166)/(51-74) 131/65  Arterial Line BP: (153-159)/(66-68) 159/68  SpO2:  [90 %-100 %] 99 %  on  Flow (L/min):  [2-4] 4;   O2 Device: nasal cannula  Body mass index is 24.03 kg/(m^2).     Physical Exam   Constitutional: She is oriented to person, place, and time. No distress.   Cardiovascular: Normal rate and regular rhythm.    No murmur heard.  Pulmonary/Chest: Effort normal and breath sounds normal.   Abdominal: Soft. Bowel sounds are normal. She exhibits no distension. There is no tenderness.   Musculoskeletal: Normal range of motion. She exhibits no edema.   Neurological: She is alert and oriented to person, place, and time.   Skin: Skin is warm and dry. She is not diaphoretic. Right hip wound dressed.     Results Review:       I reviewed the patient's new clinical results.     Results from last 7 days  Lab Units 10/08/17  0655 10/07/17  1338   WBC 10*3/mm3 14.69* 13.86*   HEMOGLOBIN g/dL 12.2 13.2   PLATELETS 10*3/mm3 203 196         Results from last 7 days  Lab Units 10/08/17  0655 10/07/17  1259   SODIUM mmol/L 133* 133*   POTASSIUM mmol/L 4.6 4.5   CHLORIDE mmol/L 96* 94*   CO2 mmol/L 27.0 25.3   BUN mg/dL 21 29*   CREATININE mg/dL 1.03* 1.39*   GLUCOSE mg/dL 285* 280*   Estimated Creatinine Clearance: 35.7 mL/min (by C-G formula based on Cr of 1.03).     Results from last 7 days  Lab Units 10/08/17  0655 10/07/17  1259   CALCIUM mg/dL 9.0 9.1   ALBUMIN g/dL  --  3.70         Results from last 7 days  Lab Units 10/07/17  1722   NITRITE UA   Negative    WBC UA /HPF 31-50*   BACTERIA UA /HPF 1+*   SQUAM EPITHEL UA /HPF 0-2   URINECX   Culture in progress            Hemoglobin A1C   Date/Time Value Ref Range Status   10/08/2017 0655 10.95 (H) 4.80 - 5.60 % Final            Glucose   Date/Time Value Ref Range Status   10/08/2017 0952 251 (H) 70 - 130 mg/dL Final         CT Head Without Contrast   Final Result   There is no evidence for an acute intracranial abnormality.   Findings consistent with moderate chronic small vessel ischemic change   of the cerebral white matter are noted.        XR Femur 2 View Right   Final Result   Linear nondisplaced subcapital left femoral neck fracture.        XR Chest 1 View   Final Result   There is no evidence for an active or acute cardiopulmonary   process.            [MAR Hold] calcium carbonate 500 mg Oral BID   [MAR Hold] ceftriaxone 1 g Intravenous Q24H   [MAR Hold] digoxin 125 mcg Oral Daily   [MAR Hold] donepezil 10 mg Oral Nightly   fentaNYL citrate (PF)         [MAR Hold] insulin aspart 0-9 Units Subcutaneous 4x Daily With Meals & Nightly   [MAR Hold] ivabradine HCl 7.5 mg Oral Daily With Breakfast & Dinner   metoprolol succinate XL 50 mg Oral Daily   [MAR Hold] sennosides-docusate sodium 2 tablet Oral BID         sodium chloride 75 mL/hr Last Rate: 75 mL/hr (10/07/17 1928)      Assessment/Plan      Assessment:            Active Hospital Problems (** Indicates Principal Problem)     Diagnosis Date Noted   • **Closed subcapital fracture of left femur [S72.012A] 10/07/2017   • Acute kidney injury [N17.9] 10/07/2017   • Hyperglycemia [R73.9] 10/07/2017   • Leukocytosis [D72.829] 10/07/2017   • Chronic combined systolic and diastolic heart failure [I50.42] 04/30/2017   • 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      Plan:   ¨ POD#0 CEMENTED BIPOLAR  ¨ Appreciate cardiology assistance. Monitor on telemetry postop.  ¨ LORTAB 5/325 mg PO q6h prn pain.  ¨ Continue MiraLAX and Senokot prn for constipation.  ¨ DVT prophylaxis per ortho ( mg a day. When complete resume 81 mg daily)  ¨ Physical therapy to improve mobility and range of motion.  ¨ Patient encouraged to use incentive spirometer as instructed.  ¨ Patient on calcium and will continue after discharge.  ¨ Recommend bisphosphonate therapy, such as Alendronate, for osteoporosis post discharge.  Should also have bone densitometry to establish baseline and monitor treatment response.  Will defer this to the outpatient setting with PCP.  ¨ Leukocytosis suspect is reactive. Recheck.  ¨ A1c 10.95. Discussed with family x2 at length. For now use correctional insulin. DM educator. Nutrition. Probably home on metformin assuming renal function stable. Time: 35 minutes, greater than 50% spent in counseling/ coordination of care.     Disposition  ¨ Will need SNU.         Xander Apodaca MD  10/08/17  4:08 PM

## 2017-10-08 NOTE — ANESTHESIA PROCEDURE NOTES
Arterial Line    Patient location during procedure: holding area  Start time: 10/8/2017 10:35 AM  Stop Time:10/8/2017 10:46 AM       Line placed for ABGs/Labs/ISTAT and hemodynamic monitoring.  Performed By   Anesthesiologist: ARCELIA HUERTA  Preanesthetic Checklist  Completed: patient identified, site marked, surgical consent, pre-op evaluation, timeout performed, IV checked, risks and benefits discussed and monitors and equipment checked  Arterial Line Prep   Sterile Tech: cap and gloves  Prep: ChloraPrep  Patient monitoring: blood pressure monitoring, continuous pulse oximetry and EKG  Arterial Line Procedure   Laterality:left  Location:  radial artery  Catheter size: 20 G   Guidance: ultrasound guided  PROCEDURE NOTE/ULTRASOUND INTERPRETATION.  Using ultrasound guidance the potential vascular sites for insertion of the catheter were visualized to determine the patency of the vessel to be used for vascular access.  After selecting the appropriate site for insertion, the needle was visualized under ultrasound being inserted into the radial artery, followed by ultrasound confirmation of wire and catheter placement. There were no abnormalities seen on ultrasound; an image was taken; and the patient tolerated the procedure with no complications.   Number of attempts: 1  Successful placement: yes          Post Assessment   Dressing Type: occlusive dressing applied, secured with tape and wrist guard applied.   Complications no  Circ/Move/Sens Assessment: normal.   Patient Tolerance: patient tolerated the procedure well with no apparent complications  Additional Notes  Ultrasound interpretation:  With ultrasound guidance, the available vessels were examined and the artery was found to be patent.  The needle and wire were seen entering the artery and the catheter was confirmed in the artery.  No abnormalities noted.

## 2017-10-08 NOTE — ANESTHESIA PROCEDURE NOTES
Airway  Urgency: elective    Date/Time: 10/8/2017 11:32 AM  End Time:10/8/2017 11:32 AM  Airway not difficult    General Information and Staff    Patient location during procedure: OR  Anesthesiologist: MAVERICK PRADHAN    Indications and Patient Condition  Indications for airway management: airway protection    Preoxygenated: yes  Mask difficulty assessment: 1 - vent by mask    Final Airway Details  Final airway type: endotracheal airway      Successful airway: ETT  Cuffed: yes   Successful intubation technique: direct laryngoscopy  Facilitating devices/methods: intubating stylet  Endotracheal tube insertion site: oral  Blade: Haro  Blade size: #2  ETT size: 7.0 mm  Cormack-Lehane Classification: grade IIa - partial view of glottis  Placement verified by: chest auscultation and capnometry   Number of attempts at approach: 1

## 2017-10-08 NOTE — CONSULTS
Date of Hospital Visit: 10/08/17  Encounter Provider: Son Jasmien MD  Place of Service: [facility] CARDIOLOGY  Patient Name: Jada Keith  :4/10/1926  [id number]  Referral Provider: Gilles Helms MD    Chief complaint: fall    Consulted for: CHF, pre op clearance    History of Present Illness: Ms. Keith is a 89 y/o patient of [facility] who has a history of chemotherapy induced CM, AMBER-nocturnal O2, PSVT, HTN, GERD and non-Hodgkin's lymphoma. She just saw the NP with [facility] on 10/3/17 for f/u CHF. At that time she was euvolemic and was not decompensated. No medication changes were made and she was scheduled to follow up in 2 months.     She presented to the ED yesterday s/p fall with right hip pain. On arrival her bp was 142/60, HR 68-paced with LBBB on EKG. Labs showed a bun/creat of 29/1.39, Na 133, K 4.5, neg troponin, WBC 13.8, h/h 13.2/40. XR femur confirmed a left femoral neck fracture.     We have been asked to see for cardiac clearance and CHF. Surgery to right hip is planned for today. She last had a cardiac cath in  with showed luminal irregularities, EF of 35% and mild MR. Her HR and BP have been stable overnight. Looks as though she may have had a stress test in -records not in epic.    Previous testing:  Echo 2017  · The left ventricular cavity is borderline dilated.  · Left ventricular wall thickness is consistent with a thin walled ventricle.  · Right ventricular cavity is mildly dilated.  · Severely reduced right ventricular systolic function noted.  · Moderate-to-severe mitral valve regurgitation is present  · Moderate aortic valve stenosis is present.  · Mild to moderate tricuspid valve regurgitation is present.  · Left ventricular function is severely decreased. Estimated EF = 22%.  · Mild pulmonic valve regurgitation is present.  · Left atrial cavity size is moderately dilated.    Past Medical History:   Diagnosis Date   • CHF (congestive heart failure)     • Coronary artery disease    • GERD (gastroesophageal reflux disease)    • History of transfusion    • Hypertension    • Lymphoma        Past Surgical History:   Procedure Laterality Date   • BACK SURGERY     • CARDIAC CATHETERIZATION     • CHOLECYSTECTOMY     • HYSTERECTOMY     • TUMOR REMOVAL         Prescriptions Prior to Admission   Medication Sig Dispense Refill Last Dose   • aspirin 81 MG EC tablet Take 81 mg by mouth Daily.   10/7/2017 at Unknown time   • digoxin (LANOXIN) 125 MCG tablet Take 125 mcg by mouth Daily.   10/6/2017 at Unknown time   • donepezil (ARICEPT) 10 MG tablet Take 10 mg by mouth Every Night.   10/6/2017 at Unknown time   • enalapril (VASOTEC) 20 MG tablet Take 20 mg by mouth Daily.   10/6/2017 at Unknown time   • furosemide (LASIX) 40 MG tablet Take 40 mg by mouth 2 (Two) Times a Day.   10/6/2017 at Unknown time   • HYDROcodone-acetaminophen (NORCO) 5-325 MG per tablet Take 1 tablet by mouth Every 6 (Six) Hours As Needed.   10/7/2017 at Unknown time   • ivabradine HCl (CORLANOR) 5 MG tablet tablet Take 7.5 mg by mouth Daily With Breakfast & Dinner.   10/6/2017 at Unknown time   • metoprolol succinate XL (TOPROL-XL) 50 MG 24 hr tablet Take 50 mg by mouth Daily.   10/6/2017 at Unknown time   • spironolactone (ALDACTONE) 25 MG tablet Take 25 mg by mouth Daily.   10/6/2017 at Unknown time   • acetaminophen (TYLENOL) 325 MG tablet Take 2 tablets by mouth Every 4 (Four) Hours As Needed for Headache or Fever (fever greater than 101.5 F).  0 Unknown at Unknown time   • acetaminophen (TYLENOL) 650 MG suppository Insert 1 suppository into the rectum Every 4 (Four) Hours As Needed for Fever (temperature greater than 101.5 F or headache).  0 Unknown at Unknown time   • bisacodyl (DULCOLAX) 10 MG suppository Insert 1 suppository into the rectum Daily As Needed for Constipation.  0 Unknown at Unknown time   • calcium carbonate (TUMS) 500 MG chewable tablet Chew 1,000 mg 2 (Two) Times a Day As Needed  for Heartburn.   Unknown at Unknown time   • calcium citrate-vitamin d (CITRACAL) 200-250 MG-UNIT tablet tablet Take  by mouth Daily.   Unknown at Unknown time   • muscle rub (Merrill-Loo) 10-15 % cream cream Apply  topically Every 1 (One) Hour As Needed (leg pain/cramps).  0 Unknown at Unknown time   • ondansetron (ZOFRAN) 4 MG tablet Take 1 tablet by mouth Every 6 (Six) Hours As Needed for Nausea or Vomiting. 60 tablet 0 Unknown at Unknown time       Current Meds  Scheduled Meds:    calcium carbonate 500 mg Oral BID   digoxin 125 mcg Oral Daily   donepezil 10 mg Oral Nightly   insulin aspart 0-7 Units Subcutaneous 4x Daily With Meals & Nightly   ivabradine HCl 7.5 mg Oral Daily With Breakfast & Dinner   metoprolol succinate XL 50 mg Oral Daily   sennosides-docusate sodium 2 tablet Oral BID     Continuous Infusions:    sodium chloride 75 mL/hr Last Rate: 75 mL/hr (10/07/17 1928)         Allergies as of 10/07/2017 - Emilio as Reviewed 10/07/2017   Allergen Reaction Noted   • Sertraline Hallucinations 06/11/2012   • Risedronate sodium  06/11/2012   • Sulfa antibiotics Nausea And Vomiting 11/01/2016       Social History     Social History   • Marital status:      Spouse name: N/A   • Number of children: N/A   • Years of education: N/A     Occupational History   • Not on file.     Social History Main Topics   • Smoking status: Never Smoker   • Smokeless tobacco: Not on file   • Alcohol use No   • Drug use: No   • Sexual activity: Defer     Other Topics Concern   • Not on file     Social History Narrative    Exercises at [employer]       Family History   Problem Relation Age of Onset   • Family history unknown: Yes       REVIEW OF SYSTEMS:   ROS was performed and is negative except as outlined in HPI     REVIEW OF SYSTEMS:   CONSTITUTIONAL: No weight loss, fever, chills, weakness or fatigue.   HEENT: Eyes: No visual loss, blurred vision, double vision or yellow sclerae. Ears, Nose, Throat: No hearing loss, sneezing,  "congestion, runny nose or sore throat.   SKIN: No rash or itching.     RESPIRATORY: No shortness of breath, hemoptysis, cough or sputum.   GASTROINTESTINAL: No anorexia, nausea, vomiting or diarrhea. No abdominal pain, bright red blood per rectum or melena.  GENITOURINARY: No burning on urination, hematuria or increased frequency.  NEUROLOGICAL: No headache, dizziness, syncope, paralysis, ataxia, numbness or tingling in the extremities. No change in bowel or bladder control.   MUSCULOSKELETAL: No muscle, back pain, joint pain or stiffness.   HEMATOLOGIC: No anemia, bleeding or bruising.   LYMPHATICS: No enlarged nodes. No history of splenectomy.   PSYCHIATRIC: No history of depression, anxiety, hallucinations.   ENDOCRINOLOGIC: No reports of sweating, cold or heat intolerance. No polyuria or polydipsia.       Objective:   Temp:  [97.5 °F (36.4 °C)-98.7 °F (37.1 °C)] 98.3 °F (36.8 °C)  Heart Rate:  [68-88] 80  Resp:  [18-26] 18  BP: (133-166)/(48-74) 134/61  Body mass index is 24.03 kg/(m^2).  Flowsheet Rows         First Filed Value    Admission Height  64\" (162.6 cm) Documented at 10/07/2017 1037    Admission Weight  140 lb (63.5 kg) Documented at 10/07/2017 1037        Vitals:    10/08/17 0721   BP: 134/61   Pulse: 80   Resp: 18   Temp: 98.3 °F (36.8 °C)   SpO2: 95%       Head:    Normocephalic, without obvious abnormality, atraumatic   Eyes:            Lids and lashes normal, conjunctivae and sclerae normal, no   icterus, no pallor   Ears:    Ears appear intact with no abnormalities noted   Throat:   No oral lesions, dentition good   Neck:   No adenopathy, supple, trachea midline, no thyromegaly, no   carotid bruit, no JVD   Lungs:     Breath sounds are equal and clear to auscultation    Heart:    Normal S1 and S2, RRR, No M/G/R   Abdomen:     Normal bowel sounds, no masses, no organomegaly, soft        non-tender, non-distended, no guarding   Extremities:   Moves all extremities well, no edema, no cyanosis, no " redness   Pulses:   Pulses palpable and equal bilaterally.    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       Plan:This is an elderly lady with a history of a severe cardiomyopathy diabetes it's not treated non-Hodgkin's lymphoma who tripped and fell and broke her hip she is not having chest pain shortness of breath PND orthopnea edema no syncope no palpitations.  Given her age and other comorbidities she certainly is at increased risk however her hip is broken and if we do not fix that she will never get out of bed and will be quite painful for her and gone over her goals of therapy which her comfort and dignity.  In line with that I would recommend that she go ahead and have her hip repaired.  After surgery she will be a DNR and I did go over that with her    Son Jasmine MD  10/08/17  7:42 AM.

## 2017-10-08 NOTE — OP NOTE
HIP ENDOPROSTHESIS  Procedure Note    Jada Keith  10/7/2017 - 10/8/2017    Pre-op Diagnosis:   1. Right femoral neck fracture displaced  2.   3.     Post-op Diagnosis:     1.  Same  2.   3.     Procedure(s):  1.  Right cemented bipolar hemiarthroplasty of the hip for fracture  2.   3.   4.     Surgeon(s):  Michael England MD    Anesthesia: General  Anesthesiologist: Chavez Livingston MD; Yaakov Handley MD  CRNA: Gregory Gonzalez CRNA    Staff:   Circulator: Destiny Rollins RN; Leigh Sullivan RN  Scrub Person: Mehnaz Christina  Assistant: Eugenio Case PA-C    Specimens:                * No orders in the log *      Drains: None    Estimated Blood Loss: minimal    Description of Procedure: Following induction of anesthesia patient was placed in the left lateral decubitus position.  All bony processes were well padded.  The right hip and lower sugar me with her prepped and draped in a sterile fashion.  I marked the hip for a posterior approach and the skin was and cover with Ioban.  I made a slightly curved incision centering over the posterior border of the greater trochanter.  I sharply dissected down to the tensor fascia luann and gluteus medius.  We incised the tensor fascia longitudinally and bluntly divided the fibers of the gluteus medius.  The right lower joint was in place into abduction and internal rotation.  I was able to expose the short external rotating tendons as well as the capsule of the posterior hip.  I sharply released the tendons of the exterior rotators sharply released the capsule exposing the fracture site.  I then located the lesser trochanter and marked the bone fingers breadth above this for our femoral neck cut.  The oscillating saw was used to make the femoral neck cut and I removed a small portion of the bone.  We then used the comminution of the corkscrew and a Zhong elevator to arias and then removed the intact femoral head.  It was taken to the back table for  sizing.  I then removed a portion of the ligamentum teres and irrigated the acetabulum.  She did not have significant arthritic change.  We then began preparing the proximal femoral canal.  With the leg again in abduction and severe internal rotation I was able to expose the cut surface of the femoral neck.  I used the cookie cutter to make a starting point near the greater trochanter and then placed T-handle awl into the proximal femoral canal.  We sequentially reamed and then broached up to a diameter of 11 mm distally.  I decided upon an 11 mm stem for final implant size.  We then removed the trial implant and prepare the proximal femoral canal for cementing.  I used irrigation and the whisk brush to remove cancellus bony pieces.  I then place our cement restrictor distally at 120 mm.  We performed a retrograde cementing technique with pressurization and I slowly advanced the [employer] into the stem into our cement mantle.  I held in around 20-25° of anteversion.  After removing excessive cement and holding the stem in place during the curing time for the cement we then performed a trial reduction with a 46 mm head with a standard offset which gave excellent restoration of the motion of her hip with good stability and appeared to restore her limb length.  We then removed the trial shell and then dried the trunnion of the stem and impacted our final components into place.  We then reduced the hip again and extensively irrigated with a pulse lavage.  We then performed closure using 0 Ethibond to close the tensor fascia and fascia of the gluteus medius.  I used a running 0 Vicryl to close deep subcuticular tissue and 2-0 Vicryl to close subcutaneous tissue.  We then closed the skin with staples.  She was placed into a soft sterile dressing anterior abduction pillow .   Note that Eugenio Walls assisted me in the surgery his help was necessary to help both of holding the patient's limb and the retractors and he  allowed me to perform the procedure in a much quicker and safer fashion.    Findings: See Dictation    Complications: None      Michael Engladn MD     Date: 10/8/2017  Time: 1:28 PM

## 2017-10-09 PROBLEM — E87.1 HYPONATREMIA: Status: ACTIVE | Noted: 2017-10-09

## 2017-10-09 LAB
ANION GAP SERPL CALCULATED.3IONS-SCNC: 12.6 MMOL/L
BACTERIA SPEC AEROBE CULT: NORMAL
BACTERIA SPEC AEROBE CULT: NORMAL
BUN BLD-MCNC: 16 MG/DL (ref 8–23)
BUN/CREAT SERPL: 14.8 (ref 7–25)
CALCIUM SPEC-SCNC: 8.2 MG/DL (ref 8.2–9.6)
CHLORIDE SERPL-SCNC: 98 MMOL/L (ref 98–107)
CO2 SERPL-SCNC: 21.4 MMOL/L (ref 22–29)
CREAT BLD-MCNC: 1.08 MG/DL (ref 0.57–1)
DEPRECATED RDW RBC AUTO: 48.2 FL (ref 37–54)
DIGOXIN SERPL-MCNC: 0.5 NG/ML (ref 0.6–1.2)
ERYTHROCYTE [DISTWIDTH] IN BLOOD BY AUTOMATED COUNT: 14.4 % (ref 11.7–13)
GFR SERPL CREATININE-BSD FRML MDRD: 48 ML/MIN/1.73
GLUCOSE BLD-MCNC: 348 MG/DL (ref 65–99)
GLUCOSE BLDC GLUCOMTR-MCNC: 307 MG/DL (ref 70–130)
GLUCOSE BLDC GLUCOMTR-MCNC: 333 MG/DL (ref 70–130)
GLUCOSE BLDC GLUCOMTR-MCNC: 350 MG/DL (ref 70–130)
GLUCOSE BLDC GLUCOMTR-MCNC: 383 MG/DL (ref 70–130)
HCT VFR BLD AUTO: 32.5 % (ref 35.6–45.5)
HGB BLD-MCNC: 10.3 G/DL (ref 11.9–15.5)
MAGNESIUM SERPL-MCNC: 1.8 MG/DL (ref 1.7–2.3)
MCH RBC QN AUTO: 29.2 PG (ref 26.9–32)
MCHC RBC AUTO-ENTMCNC: 31.7 G/DL (ref 32.4–36.3)
MCV RBC AUTO: 92.1 FL (ref 80.5–98.2)
PLATELET # BLD AUTO: 179 10*3/MM3 (ref 140–500)
PMV BLD AUTO: 10 FL (ref 6–12)
POTASSIUM BLD-SCNC: 4.6 MMOL/L (ref 3.5–5.2)
POTASSIUM BLD-SCNC: 4.6 MMOL/L (ref 3.5–5.2)
RBC # BLD AUTO: 3.53 10*6/MM3 (ref 3.9–5.2)
SODIUM BLD-SCNC: 132 MMOL/L (ref 136–145)
WBC NRBC COR # BLD: 17.91 10*3/MM3 (ref 4.5–10.7)

## 2017-10-09 PROCEDURE — 80162 ASSAY OF DIGOXIN TOTAL: CPT | Performed by: HOSPITALIST

## 2017-10-09 PROCEDURE — 82962 GLUCOSE BLOOD TEST: CPT

## 2017-10-09 PROCEDURE — 25010000002 ONDANSETRON PER 1 MG: Performed by: HOSPITALIST

## 2017-10-09 PROCEDURE — 85027 COMPLETE CBC AUTOMATED: CPT | Performed by: HOSPITALIST

## 2017-10-09 PROCEDURE — 97110 THERAPEUTIC EXERCISES: CPT

## 2017-10-09 PROCEDURE — 93010 ELECTROCARDIOGRAM REPORT: CPT | Performed by: INTERNAL MEDICINE

## 2017-10-09 PROCEDURE — 25010000002 CEFTRIAXONE PER 250 MG: Performed by: HOSPITALIST

## 2017-10-09 PROCEDURE — 93005 ELECTROCARDIOGRAM TRACING: CPT | Performed by: HOSPITALIST

## 2017-10-09 PROCEDURE — 25010000002 MORPHINE PER 10 MG: Performed by: HOSPITALIST

## 2017-10-09 PROCEDURE — 83735 ASSAY OF MAGNESIUM: CPT | Performed by: HOSPITALIST

## 2017-10-09 PROCEDURE — 63710000001 INSULIN DETEMER PER 5 UNITS: Performed by: HOSPITALIST

## 2017-10-09 PROCEDURE — 80048 BASIC METABOLIC PNL TOTAL CA: CPT | Performed by: HOSPITALIST

## 2017-10-09 PROCEDURE — 97162 PT EVAL MOD COMPLEX 30 MIN: CPT

## 2017-10-09 PROCEDURE — 84132 ASSAY OF SERUM POTASSIUM: CPT | Performed by: HOSPITALIST

## 2017-10-09 PROCEDURE — 99232 SBSQ HOSP IP/OBS MODERATE 35: CPT | Performed by: INTERNAL MEDICINE

## 2017-10-09 PROCEDURE — 63710000001 INSULIN ASPART PER 5 UNITS: Performed by: HOSPITALIST

## 2017-10-09 RX ORDER — METOPROLOL SUCCINATE 25 MG/1
25 TABLET, EXTENDED RELEASE ORAL DAILY
Status: DISCONTINUED | OUTPATIENT
Start: 2017-10-10 | End: 2017-10-12 | Stop reason: HOSPADM

## 2017-10-09 RX ORDER — SENNA AND DOCUSATE SODIUM 50; 8.6 MG/1; MG/1
2 TABLET, FILM COATED ORAL NIGHTLY
Status: DISCONTINUED | OUTPATIENT
Start: 2017-10-09 | End: 2017-10-12 | Stop reason: HOSPADM

## 2017-10-09 RX ORDER — SODIUM CHLORIDE 9 MG/ML
75 INJECTION, SOLUTION INTRAVENOUS CONTINUOUS
Status: DISCONTINUED | OUTPATIENT
Start: 2017-10-09 | End: 2017-10-10

## 2017-10-09 RX ADMIN — DONEPEZIL HYDROCHLORIDE 10 MG: 10 TABLET, FILM COATED ORAL at 23:03

## 2017-10-09 RX ADMIN — ONDANSETRON 4 MG: 2 INJECTION INTRAMUSCULAR; INTRAVENOUS at 02:56

## 2017-10-09 RX ADMIN — CALCIUM 500 MG: 500 TABLET ORAL at 08:40

## 2017-10-09 RX ADMIN — CEFTRIAXONE SODIUM 1 G: 1 INJECTION, SOLUTION INTRAVENOUS at 08:45

## 2017-10-09 RX ADMIN — INSULIN ASPART 8 UNITS: 100 INJECTION, SOLUTION INTRAVENOUS; SUBCUTANEOUS at 11:35

## 2017-10-09 RX ADMIN — SENNOSIDES AND DOCUSATE SODIUM 2 TABLET: 8.6; 5 TABLET ORAL at 23:02

## 2017-10-09 RX ADMIN — MORPHINE SULFATE 2 MG: 2 INJECTION, SOLUTION INTRAMUSCULAR; INTRAVENOUS at 06:56

## 2017-10-09 RX ADMIN — INSULIN ASPART 7 UNITS: 100 INJECTION, SOLUTION INTRAVENOUS; SUBCUTANEOUS at 23:03

## 2017-10-09 RX ADMIN — METOPROLOL SUCCINATE 50 MG: 50 TABLET, FILM COATED, EXTENDED RELEASE ORAL at 08:40

## 2017-10-09 RX ADMIN — POLYETHYLENE GLYCOL 3350 17 G: 17 POWDER, FOR SOLUTION ORAL at 08:39

## 2017-10-09 RX ADMIN — INSULIN ASPART 8 UNITS: 100 INJECTION, SOLUTION INTRAVENOUS; SUBCUTANEOUS at 17:24

## 2017-10-09 RX ADMIN — SODIUM CHLORIDE 75 ML/HR: 9 INJECTION, SOLUTION INTRAVENOUS at 17:23

## 2017-10-09 RX ADMIN — CALCIUM 500 MG: 500 TABLET ORAL at 17:23

## 2017-10-09 RX ADMIN — ASPIRIN 325 MG: 325 TABLET ORAL at 08:40

## 2017-10-09 RX ADMIN — INSULIN DETEMIR 20 UNITS: 100 INJECTION, SOLUTION SUBCUTANEOUS at 23:04

## 2017-10-09 RX ADMIN — HYDROCODONE BITARTRATE AND ACETAMINOPHEN 2 TABLET: 5; 325 TABLET ORAL at 09:53

## 2017-10-09 RX ADMIN — INSULIN ASPART 7 UNITS: 100 INJECTION, SOLUTION INTRAVENOUS; SUBCUTANEOUS at 08:40

## 2017-10-09 RX ADMIN — DIGOXIN 125 MCG: 0.12 TABLET ORAL at 11:35

## 2017-10-09 NOTE — PROGRESS NOTES
Name: Jada Keith ADMIT: 10/7/2017   : 4/10/1926  PCP: Son Wall MD    MRN: [id number] LOS: 2 days   AGE/SEX: 89 y.o. female  ROOM: [facility]     Subjective   Subjective:  Symptoms:  Improved.  No shortness of breath or chest pain.    Diet:  Adequate intake.  No nausea.    Activity level: Impaired due to pain.    Pain:  She complains of pain that is mild.  She reports pain is improving.  Pain is well controlled and requiring pain medication.        Objective   Vital Signs  Temp:  [97.1 °F (36.2 °C)-98.7 °F (37.1 °C)] 97.9 °F (36.6 °C)  Heart Rate:  [] 79  Resp:  [14-18] 18  BP: (111-140)/(46-71) 113/49  Arterial Line BP: (144-159)/(53-69) 144/53  SpO2:  [90 %-100 %] 90 %  on  Flow (L/min):  [2-4] 2;   O2 Device: room air  Body mass index is 24.03 kg/(m^2).    Physical Exam   Constitutional: She is oriented to person, place, and time. No distress.   Cardiovascular: Normal rate and regular rhythm.    No murmur heard.  Pulmonary/Chest: Effort normal and breath sounds normal.   Abdominal: Soft. Bowel sounds are normal. She exhibits no distension. There is no tenderness.   Musculoskeletal: Normal range of motion. She exhibits no edema.   Neurological: She is alert and oriented to person, place, and time.   Skin: Skin is warm and dry. She is not diaphoretic.       Results Review:       I reviewed the patient's new clinical results.    Results from last 7 days  Lab Units 10/09/17  0420 10/08/17  0655 10/07/17  1338   WBC 10*3/mm3 17.91* 14.69* 13.86*   HEMOGLOBIN g/dL 10.3* 12.2 13.2   PLATELETS 10*3/mm3 179 203 196     Results from last 7 days  Lab Units 10/09/17  0420 10/08/17  0655 10/07/17  1259   SODIUM mmol/L 132* 133* 133*   POTASSIUM mmol/L 4.6  4.6 4.6 4.5   CHLORIDE mmol/L 98 96* 94*   CO2 mmol/L 21.4* 27.0 25.3   BUN mg/dL 16 21 29*   CREATININE mg/dL 1.08* 1.03* 1.39*   GLUCOSE mg/dL 348* 285* 280*   Estimated Creatinine Clearance: 34 mL/min (by C-G formula based on Cr of 1.08).  Results  from last 7 days  Lab Units 10/09/17  0420 10/08/17  0655 10/07/17  1259   CALCIUM mg/dL 8.2 9.0 9.1   ALBUMIN g/dL  --   --  3.70   MAGNESIUM mg/dL 1.8  --   --          Results from last 7 days  Lab Units 10/07/17  1722   NITRITE UA  Negative   WBC UA /HPF 31-50*   BACTERIA UA /HPF 1+*   SQUAM EPITHEL UA /HPF 0-2   URINECX  <25,000 CFU/mL Mixed Trini Isolated         aspirin 325 mg Oral Daily   calcium carbonate 500 mg Oral BID   ceftriaxone 1 g Intravenous Q24H   digoxin 125 mcg Oral Daily   donepezil 10 mg Oral Nightly   insulin aspart 0-9 Units Subcutaneous 4x Daily With Meals & Nightly   ivabradine HCl 7.5 mg Oral Daily With Breakfast & Dinner   metoprolol succinate XL 50 mg Oral Daily   polyethylene glycol 17 g Oral Daily       sodium chloride 75 mL/hr Last Rate: Stopped (10/09/17 0302)         Assessment/Plan   Assessment:     Active Hospital Problems (** Indicates Principal Problem)    Diagnosis Date Noted   • **Closed subcapital fracture of left femur [S72.012A] 10/07/2017   • Hyponatremia [E87.1] 10/09/2017   • DM type 2 (diabetes mellitus, type 2), new diagnosis [E11.9] 10/08/2017   • Acute kidney injury [N17.9] 10/07/2017   • Leukocytosis [D72.829] 10/07/2017   • Chronic combined systolic and diastolic heart failure [I50.42] 04/30/2017   • 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       ¨ POD#1 CEMENTED BIPOLAR  ¨ Appreciate cardiology assistance. Monitor on telemetry postop. Transfer to ortho floor when okay w/ cardiology.  ¨ LORTAB 5/325 mg PO q6h prn pain.  ¨ Continue MiraLAX and Senokot prn for constipation.  ¨ DVT prophylaxis per ortho ( mg a  day. When complete resume 81 mg daily)  ¨ Physical therapy to improve mobility and range of motion.  ¨ Patient encouraged to use incentive spirometer as instructed.  ¨ Patient on calcium and will continue after discharge.  ¨ Recommend bisphosphonate therapy, such as Alendronate, for osteoporosis post discharge.  Should also have bone densitometry to establish baseline and monitor treatment response.  Will defer this to the outpatient setting with PCP.  ¨ Leukocytosis suspect is reactive. Monitor. Doubt UTI given culture results. Will stop Rocephin.  ¨ A1c 10.95. For now use correctional insulin. DM educator. Nutrition. Probably home on metformin assuming renal function stable.       Disposition  ¨ Will need SNU. Can transfer to ortho floor if okay w/ cards.      Gilles Helms MD  [facility]  10/09/17  9:08 AM

## 2017-10-09 NOTE — PROGRESS NOTES
POD 1 right hip bipolar, comfortable  AF, VSS  Right hip= dressing dry, intact, no swelling    hgb- 10.3    Plan- slowly mobilize         D/c planning       DVT proph

## 2017-10-09 NOTE — DISCHARGE PLACEMENT REQUEST
"Jada Keith (89 y.o. Female)     Date of Birth Social Security Number Address Home Phone MRN    04/10/1926  [address] [address] [phone] [id number]    Protestant Marital Status          Pentecostalism        Admission Date Admission Type Admitting Provider Attending Provider Department, Room/Bed    10/7/17 Emergency Gilles Helms MD Ray, Jonathan, MD [address], 669/1    Discharge Date Discharge Disposition Discharge Destination                      Attending Provider: Gilles Helms MD     Allergies:  Sertraline, Risedronate Sodium, Sulfa Antibiotics    Isolation:  None   Infection:  None   Code Status:  Conditional    Ht:  64\" (162.6 cm)   Wt:  140 lb (63.5 kg)    Admission Cmt:  None   Principal Problem:  Closed subcapital fracture of left femur [S72.012A]                 Active Insurance as of 10/7/2017     Primary Coverage     Payor Plan Insurance Group Employer/Plan Group    MEDICARE MEDICARE A & B      Payor Plan Address Payor Plan Phone Number Effective From Effective To    [address] [phone] 4/1/[address]     [place] [address]       Subscriber Name Subscriber Birth Date Member ID       JADA KEITH 4/10/1926 [id number]           Secondary Coverage     Payor Plan Insurance Group Employer/Plan Group    AARP MED SUPP AAR HEALTH CARE OPTIONS PLAN N     Payor Plan Address Payor Plan Phone Number Effective From Effective To    [facility] [phone] 1/1/2013     [address]       [place] [address]       Subscriber Name Subscriber Birth Date Member ID       JADA KEITH 4/10/1926 [id number]                 Emergency Contacts      (Rel.) Home Phone Work Phone Mobile Phone    Berkley Grullon (Daughter) [phone] -- [phone]              "

## 2017-10-09 NOTE — DISCHARGE PLACEMENT REQUEST
"Jada Keith (89 y.o. Female)     Date of Birth Social Security Number Address Home Phone MRN    04/10/1926  [address] [address] [phone] [id number]    Pentecostalism Marital Status          Sikhism        Admission Date Admission Type Admitting Provider Attending Provider Department, Room/Bed    10/7/17 Emergency Gilles Helms MD Ray, Jonathan, MD [address], 669/1    Discharge Date Discharge Disposition Discharge Destination                      Attending Provider: Gilles Helms MD     Allergies:  Sertraline, Risedronate Sodium, Sulfa Antibiotics    Isolation:  None   Infection:  None   Code Status:  Conditional    Ht:  64\" (162.6 cm)   Wt:  140 lb (63.5 kg)    Admission Cmt:  None   Principal Problem:  Closed subcapital fracture of left femur [S72.012A]                 Active Insurance as of 10/7/2017     Primary Coverage     Payor Plan Insurance Group Employer/Plan Group    MEDICARE MEDICARE A & B      Payor Plan Address Payor Plan Phone Number Effective From Effective To    [address] [phone] 4/1[birth year][address]     [place] [address]       Subscriber Name Subscriber Birth Date Member ID       JADA KEITH 4/10/1926 [id number]           Secondary Coverage     Payor Plan Insurance Group Employer/Plan Group    AARP MED SUPP AAR HEALTH CARE OPTIONS PLAN N     Payor Plan Address Payor Plan Phone Number Effective From Effective To    [facility] [phone] 1/1/2013     [address]       [place] [address]       Subscriber Name Subscriber Birth Date Member ID       JADA KEITH 4/10/1926 [id number]                 Emergency Contacts      (Rel.) Home Phone Work Phone Mobile Phone    Berkley Grullon (Daughter) [phone] -- [phone]              "

## 2017-10-09 NOTE — PROGRESS NOTES
Discharge Planning Assessment  [facility]     Patient Name: Jada Keith  MRN: [id number]  Today's Date: 10/9/2017    Admit Date: 10/7/2017          Discharge Needs Assessment       10/09/17 1417    Living Environment    Lives With alone    Living Arrangements independent living facility    Home Accessibility no concerns    Type of Financial/Environmental Concern none    Transportation Available ambulance;car;family or friend will provide    Living Environment Comment From Independent Living at the [facility]    Living Environment    Provides Primary Care For no one, unable/limited ability to care for self    Primary Care Provided By other (see comments)    Quality Of Family Relationships supportive;helpful;involved    Able to Return to Prior Living Arrangements other (see comments)    Living Arrangement Comments From Independent Living at the [facility] and has [facility] to assist. Daughter Berkley plans patient to go to SNF at either [facility] or [facility].     Discharge Needs Assessment    Concerns To Be Addressed basic needs concerns;discharge planning concerns;home safety concerns    Concerns Comments Lolis Gooden plans patient to go to SNF at either [facility] or [facility]. Referrals called to [facility] and to [facility].     Readmission Within The Last 30 Days no previous admission in last 30 days    Outpatient/Agency/Support Group Needs other (see comments)    Community Agency Name(S) Independent [facility] at the [facility] and [facility] in home care assistance    Anticipated Changes Related to Illness inability to care for self    Equipment Currently Used at Home none    Equipment Needed After Discharge walker, rolling    Discharge Facility/Level Of Care Needs nursing facility, skilled    Current Discharge Risk chronically ill;cognitively impaired;dependent with mobility/activities of daily living;physical impairment;lives alone    Discharge Disposition still a patient     Discharge Contact Information if Applicable Berkley canales [phone]    Discharge Planning Comments Plans [facility] vs [facility]            Discharge Plan       10/09/17 1421    Case Management/Social Work Plan    Plan Plans [facility] vs [facility]    Patient/Family In Agreement With Plan yes    Additional Comments Confirmed face sheet correct. IMM 10/7/17.        Discharge Placement     Facility/Agency Request Status Selected? Address Phone Number Fax Number    [facility] Pending - Request Sent     [address] , [facility] [address] [phone]    [facility] Pending - Request Sent     [address] [facility] , [facility] [address] [phone]        Edvin Kelley RN 10/9/2017 14:21    Called to [facility] and julien [facility].               [facility] Pending - Request Sent     [address], [facility] [address] [phone]        Edvin Kelley RN 10/9/2017 14:22    Called to [facility]                           Demographic Summary       10/09/17 1415    Referral Information    Admission Type inpatient    Arrived From admitted as an inpatient;home or self-care    Referral Source admission list    Reason For Consult discharge planning    Record Reviewed clinical discipline documentation;history and physical;medical record    Contact Information    Permission Granted to Share Information With ;family/designee    Comments Berkley Grullon daughter    Primary Care Physician Information    Name Dr. Alicia Leger [phone]  - will be a new PCP for patient and she has not had her 1st appt with her yet. Previous PCP was Dr. Wall.            Functional Status       10/09/17 1416    Functional Status Current    Ambulation 3-->assistive equipment and person    Transferring 3-->assistive equipment and person    Toileting 3-->assistive equipment and person    Bathing 2-->assistive person    Dressing 2-->assistive  person    Eating 0-->independent    Communication 0-->understands/communicates without difficulty    Swallowing (if score 2 or more for any item, consult Rehab Services) 0-->swallows foods/liquids without difficulty    Current Functional Level Comment Postop    Change in Functional Status Since Onset of Current Illness/Injury yes    Functional Status Prior    Ambulation 0-->independent    Transferring 0-->independent    Toileting 0-->independent    Bathing 0-->independent    Dressing 0-->independent    Eating 0-->independent    Communication 0-->understands/communicates without difficulty    Swallowing 0-->swallows foods/liquids without difficulty    Activity Tolerance    Usual Activity Tolerance good    Current Activity Tolerance fair            Psychosocial     None            Abuse/Neglect     None            Legal     None            Substance Abuse     None            Patient Forms     None          Edvin Kelley RN  Continued Stay Note  [facility]     Patient Name: Jada Keith  MRN: [id number]  Today's Date: 10/9/2017    Admit Date: 10/7/2017          Discharge Plan       10/09/17 1421    Case Management/Social Work Plan    Plan Plans [facility] vs [facility]    Patient/Family In Agreement With Plan yes    Additional Comments Confirmed face sheet correct. IMM 10/7/17.              Discharge Codes     None            Edvin Kelley RN

## 2017-10-09 NOTE — THERAPY EVALUATION
Acute Care - Physical Therapy Initial Evaluation  [facility]     Patient Name: Jada Keith  : 4/10/1926  MRN: [id number]  Today's Date: 10/9/2017   Onset of Illness/Injury or Date of Surgery Date: 10/07/17            Admit Date: 10/7/2017     Visit Dx:    ICD-10-CM ICD-9-CM   1. Fall, initial encounter W19.XXXA E888.9   2. Closed subcapital fracture of right femur, initial encounter S72.011A 820.09   3. 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   • Chronic combined systolic and diastolic heart failure   • Collapsed vertebra, not elsewhere classified, thoracic region, initial encounter for fracture   • Diastolic dysfunction   • Diffuse non-Hodgkin's lymphoblastic lymphoma   • Diverticulosis of intestine   • Uterine leiomyoma   • Gastroesophageal reflux disease   • Memory loss   • Moderate mitral regurgitation   • Obstructive sleep apnea syndrome   • Osteoporosis with pathological fracture   • Paroxysmal supraventricular tachycardia   • Rectal disorder   • Sinus tachycardia   • Acute systolic (congestive) heart failure   • Acute pulmonary edema   • Pedal edema   • Acute respiratory failure with hypoxemia   • Pleural effusion on right   • Essential hypertension   • Acute kidney injury   • Closed subcapital fracture of left femur   • Leukocytosis   • DM type 2 (diabetes mellitus, type 2), new diagnosis   • Hyponatremia     Past Medical History:   Diagnosis Date   • CHF (congestive heart failure)    • Coronary artery disease    • DM type 2 (diabetes mellitus, type 2), new diagnosis 10/8/2017   • GERD (gastroesophageal reflux disease)    • History of transfusion    • Hypertension    • Lymphoma      Past Surgical History:   Procedure Laterality Date   • BACK SURGERY     • CARDIAC CATHETERIZATION     • CHOLECYSTECTOMY     • HYSTERECTOMY     • DC PARTIAL HIP REPLACEMENT Right 10/8/2017     Procedure: CEMENTED BIPOLAR;  Surgeon: Michael England MD;  Location: [facility] MAIN OR;  Service: Orthopedics   • TUMOR REMOVAL            PT ASSESSMENT (last 72 hours)      PT Evaluation       10/09/17 1417 10/09/17 0936    Rehab Evaluation    Document Type  evaluation   2 sessions needed to complete eval due to pain & need of med  -DM    Subjective Information  agree to therapy;complains of;pain  -DM    Patient Effort, Rehab Treatment  good  -DM    Symptoms Noted During/After Treatment  none  -DM    General Information    Patient Profile Review  yes  -DM    Onset of Illness/Injury or Date of Surgery Date  10/07/17  -DM    General Observations  supine in bed, rottes R knee inward  -DM    Pertinent History Of Current Problem  fall>R subcapital closed femur fx, POD# 1 R cemented bipolar hemiarthroplasty;mild dementia, L BBB  -DM    Precautions/Limitations  fall precautions;hip precautions- right;brace on when up;other (see comments)   requested KI as pt unable to maintain hip precautions   -DM    Prior Level of Function  independent:  -DM    Equipment Currently Used at Home none  -VN none  -DM    Plans/Goals Discussed With  patient and family;agreed upon  -DM    Living Environment    Lives With alone  -VN     Living Arrangements independent living facility  -VN     Home Accessibility no concerns  -VN     Type of Financial/Environmental Concern none  -VN     Transportation Available ambulance;car;family or friend will provide  -VN     Living Environment [facility]  -VN     Clinical Impression    Functional Level At Time Of Evaluation  Min A  -DM    Patient/Family Goals Statement  PLOF  -DM    Criteria for Skilled Therapeutic Interventions Met  yes;treatment indicated  -DM    Pathology/Pathophysiology Noted (Describe Specifically for Each System)  musculoskeletal  -DM    Impairments Found (describe specific impairments)  gait, locomotion, and balance  -DM    Functional Limitations in  Following Categories (Describe Specific Limitations)  self-care;home management;community/leisure  -DM    Rehab Potential  good, to achieve stated therapy goals  -DM    Predicted Duration of Therapy Intervention (days/wks)  2 weeks  -DM    Vital Signs    O2 Delivery Pre Treatment  room air  -DM    Pain Assessment    Pain Assessment  Champion-Sena FACES  -DM    Champion-Sena FACES Pain Rating  8  -DM    Pain Type  Acute pain;Surgical pain  -DM    Pain Location  Hip  -DM    Pain Orientation  Right  -DM    Pain Intervention(s)  Medication (See MAR)   eval completed in2 sessions - mobility resumed after meds  -DM    Response to Interventions  good  -DM    Vision Assessment/Intervention    Visual Impairment  WFL  -DM    Cognitive Assessment/Intervention    Current Cognitive/Communication Assessment  impaired  -DM    Orientation Status  oriented to;person;place  -DM    Follows Commands/Answers Questions  75% of the time;able to follow single-step instructions;needs cueing;needs increased time;needs repetition  -DM    Personal Safety  mild impairment;decreased awareness, need for safety;other (see comments)   unable to adhere to hip precautions  -DM    Personal Safety Interventions  fall prevention program maintained;gait belt;muscle strengthening facilitated;nonskid shoes/slippers when out of bed;supervised activity  -DM    Short/Long Term Memory  moderate impairment, short term memory   per family  -DM    ROM (Range of Motion)    General ROM  no range of motion deficits identified  -DM    General ROM Detail  except R hip NT  -DM    MMT (Manual Muscle Testing)    General MMT Assessment  lower extremity strength deficits identified  -DM    General MMT Assessment Detail  R hip 2+/5  -DM    Mobility Assessment/Training    Extremity Weight-Bearing Status  right lower extremity  -DM    Right Lower Extremity Weight-Bearing  weight-bearing as tolerated;other (see comments)   for standing & transfers (gait not included in MD)  -DM     Bed Mobility, Assessment/Treatment    Bed Mob, Supine to Sit, [place]  minimum assist (75% patient effort)  -DM    Bed Mob, Sit to Supine, [place]  not tested  -DM    Bed Mobility, Safety Issues  cognitive deficits limit understanding;decreased use of legs for bridging/pushing  -DM    Bed Mobility, Impairments  strength decreased;pain  -DM    Transfer Assessment/Treatment    Transfers, Bed-Chair [place]  minimum assist (75% patient effort);verbal cues required;nonverbal cues required (demo/gesture)  -DM    Transfers, Sit-Stand [place]  minimum assist (75% patient effort);verbal cues required;nonverbal cues required (demo/gesture)  -DM    Transfers, Stand-Sit [place]  minimum assist (75% patient effort);other (see comments)   cues for hip precautions  -DM    Transfers, Sit-Stand-Sit, Assist Device  rolling walker  -DM    Transfer, Safety Issues  balance decreased during turns;step length decreased;weight-shifting ability decreased  -DM    Transfer, Impairments  strength decreased;impaired balance;pain  -DM    Transfer, Comment  pt not adhering to hip precautions despite education/cueing - needs KI  -DM    Gait Assessment/Treatment    Gait, [place] Level  not tested   awaiting clarification for gait  -DM    Gait, Comment  was able to take 2 tiny steps to turn to sit in chair  -DM    Motor Skills/Interventions    Additional Documentation  Balance Skills Training (Group)  -DM    Balance Skills Training    Sitting-Level of Assistance  Close supervision  -DM    Sitting-Balance Support  Feet supported  -DM    Standing-Level of Assistance  Minimum assistance  -DM    Static Standing Balance Support  assistive device  -DM    Therapy Exercises    Exercise Protocols  total hip  -DM    Total Hip Exercises  right:;5 reps;completed protocol;with assist  -DM    Orthotics Prosthetics    Additional Documentation  Orthosis Location (Group)  -DM    Orthosis Location    Orthosis Location/Type  lower  extremity  -DM    Orthosis, Lower Extremity  Right:;knee immobilizer;other (see comments)   requested RN to prdfer  -DM    Positioning and Restraints    Pre-Treatment Position  in bed  -DM    Post Treatment Position  chair  -DM    In Chair  notified nsg;sitting;call light within reach;encouraged to call for assist;with family/caregiver;pillow between legs   for lunch  -DM      10/07/17 1607 10/07/17 1604    General Information    Equipment Currently Used at Home  none  -ET    Living Environment    Lives With alone  -ET     Living Arrangements independent living facility  -ET     Home Accessibility no concerns  -ET     Stair Railings at Home inside, present on right side  -ET     Type of Financial/Environmental Concern none  -ET     Transportation Available ambulance;car;family or friend will provide  -ET       User Key  (r) = Recorded By, (t) = Taken By, (c) = Cosigned By    Initials Name Provider Type    ESCOBAR Church, PT Physical Therapist    ET Abigail Gaspar, RN Registered Nurse    ISRA Kelley, JOSIE Case Manager          Physical Therapy Education     Title: PT OT SLP Therapies (Active)     Topic: Physical Therapy (Active)     Point: Mobility training (Active)    Learning Progress Summary    Learner Readiness Method Response Comment Documented by Status   Patient Acceptance E,TB,D NR  DM 10/09/17 1627 Active               Point: Home exercise program (Active)    Learning Progress Summary    Learner Readiness Method Response Comment Documented by Status   Patient Acceptance E,TB,D NR  DM 10/09/17 1627 Active               Point: Body mechanics (Active)    Learning Progress Summary    Learner Readiness Method Response Comment Documented by Status   Patient Acceptance E,TB,D NR  DM 10/09/17 1627 Active               Point: Precautions (Active)    Learning Progress Summary    Learner Readiness Method Response Comment Documented by Status   Patient Acceptance E,TB,D NR  DM 10/09/17 1627 Active   Family Eager  E,TB,D VU hip precautions & need for KI DM 10/09/17 1627 Done                      User Key     Initials Effective Dates Name Provider Type Discipline    DM 10/06/15 -  Debra Church, PT Physical Therapist PT                PT Recommendation and Plan  Anticipated Discharge Disposition: skilled nursing facility  Planned Therapy Interventions: (S) balance training, bed mobility training, home exercise program, patient/family education, strengthening, transfer training, other (see comments) (gait not in MD orders, need to clarify)  PT Frequency: daily  Plan of Care Review  Plan Of Care Reviewed With: patient  Progress: progress toward functional goals as expected  Outcome Summary/Follow up Plan: Pt is s/p fall at her independent living apt and is POD#1 R cemented bipolar hemiarthroplasty; history includes mild dementia and severe cardiomyopathy with L BBB. Pt was previously independent without assistive device for functional mobility; Upon PT eval, she was found to have R LE weakness/pain and was cognitively unable to maintain hip precautions. Required Min A for bed mobility & transfer tot he recliner. I ordered a knee immobilizer to assist pt in maintaining hip precautions. She will benefit from continued skilled PT to address deficits in mobility and will need SNF rehab upon hospital discharge.          IP PT Goals       10/09/17 1621          Bed Mobility PT LTG    Bed Mobility PT LTG, Date Established 10/09/17  -DM      Bed Mobility PT LTG, Time to Achieve 1 wk  -DM      Bed Mobility PT LTG, Activity Type all bed mobility  -DM      Bed Mobility PT LTG, [place] Level contact guard assist  -DM      Bed Mobility PT Goal  LTG, Assist Device bed rails  -DM      Transfer Training PT LTG    Transfer Training PT LTG, Date Established 10/09/17  -DM      Transfer Training PT LTG, Time to Achieve 1 wk  -DM      Transfer Training PT LTG, Activity Type bed to chair /chair to bed;sit to stand/stand to sit  -DM      Transfer  Training PT LTG, [place] Level contact guard assist  -DM      Transfer Training PT LTG, Assist Device walker, rolling  -DM      Gait Training PT LTG    Gait Training Goal PT LTG, Date Established 10/09/17  -DM      Gait Training Goal PT LTG, [place] Level contact guard assist  -DM      Gait Training Goal PT LTG, Assist Device walker, rolling  -DM      Gait Training Goal PT LTG, Distance to Achieve awaiting clarification from ortho then will be 100'  -DM        User Key  (r) = Recorded By, (t) = Taken By, (c) = Cosigned By    Initials Name Provider Type    ESCOBAR Church, PT Physical Therapist                Outcome Measures       10/09/17 0936          How much help from another person do you currently need...    Turning from your back to your side while in flat bed without using bedrails? 3  -DM      Moving from lying on back to sitting on the side of a flat bed without bedrails? 3  -DM      Moving to and from a bed to a chair (including a wheelchair)? 3  -DM      Standing up from a chair using your arms (e.g., wheelchair, bedside chair)? 3  -DM      Climbing 3-5 steps with a railing? 1  -DM      To walk in hospital room? 2  -DM      AM-PAC 6 Clicks Score 15  -DM      Functional Assessment    Outcome Measure Options AM-PAC 6 Clicks Basic Mobility (PT)  -DM        User Key  (r) = Recorded By, (t) = Taken By, (c) = Cosigned By    Initials Name Provider Type    ESCOBAR Church, PT Physical Therapist           Time Calculation:         PT Charges       10/09/17 1419 10/09/17 1418       Time Calculation    Start Time 0936  -DM 1145  -DM     Stop Time 0950  -DM 1202  -DM     Time Calculation (min) 14 min  -DM 17 min  -DM     PT Received On 10/09/17  -DM 10/09/17  -DM     PT - Next Appointment  10/10/17  -DM     PT Goal Re-Cert Due Date  10/16/17  -DM       User Key  (r) = Recorded By, (t) = Taken By, (c) = Cosigned By    Initials Name Provider Type    ESCOBAR Church, PT Physical Therapist          Therapy  Charges for Today     Code Description Service Date Service Provider Modifiers Qty    56553416348 HC PT EVAL MOD COMPLEXITY 2 10/9/2017 Debra Church, PT GP 1    06503628399 HC PT THER PROC EA 15 MIN 10/9/2017 Debra Church, PT GP 2          PT G-Codes  Outcome Measure Options: AM-PAC 6 Clicks Basic Mobility (PT)      Debra Church, PT  10/9/2017

## 2017-10-09 NOTE — PLAN OF CARE
Problem: Patient Care Overview (Adult)  Goal: Plan of Care Review  Outcome: Ongoing (interventions implemented as appropriate)    Problem: Inpatient Physical Therapy  Goal: Bed Mobility Goal LTG- PT  Outcome: Ongoing (interventions implemented as appropriate)    10/09/17 1621   Bed Mobility PT LTG   Bed Mobility PT LTG, Date Established 10/09/17   Bed Mobility PT LTG, Time to Achieve 1 wk   Bed Mobility PT LTG, Activity Type all bed mobility   Bed Mobility PT LTG, [place] Level contact guard assist   Bed Mobility PT Goal LTG, Assist Device bed rails       Goal: Transfer Training Goal 1 LTG- PT  Outcome: Ongoing (interventions implemented as appropriate)    10/09/17 1621   Transfer Training PT LTG   Transfer Training PT LTG, Date Established 10/09/17   Transfer Training PT LTG, Time to Achieve 1 wk   Transfer Training PT LTG, Activity Type bed to chair /chair to bed;sit to stand/stand to sit   Transfer Training PT LTG, [place] Level contact guard assist   Transfer Training PT LTG, Assist Device walker, rolling       Goal: Gait Training Goal LTG- PT  Outcome: Ongoing (interventions implemented as appropriate)    10/09/17 1621   Gait Training PT LTG   Gait Training Goal PT LTG, Date Established 10/09/17   Gait Training Goal PT LTG, [place] Level contact guard assist   Gait Training Goal PT LTG, Assist Device walker, rolling   Gait Training Goal PT LTG, Distance to Achieve awaiting clarification from ortho then will be 100'

## 2017-10-09 NOTE — CONSULTS
"Adult Nutrition  Assessment/PES    Patient Name:  Jada Keith  YOB: 1926  MRN: [id number]  Admit Date:  10/7/2017    Assessment Date:  10/9/2017    Comments:  Education completed for new diagnosis of diabetes to patient's family, noting that patient lives in snf center, healthy food choices to assist with glycemic control and provide adequate nutrient intake.          Reason for Assessment       10/09/17 1451    Reason for Assessment    Reason For Assessment/Visit identified at risk by screening criteria    Identified At Risk By Screening Criteria new diagnosis of diabetes or cancer    Diagnosis Diagnosis    Endocrine DM Type 2    Metabolic/ICU Hyponatremia    Ortho Fracture   R hip              Nutrition/Diet History       10/09/17 1452    Nutrition/Diet History    Patient Reported Diet/Restrictions/Preferences regular    Typical Food/Fluid Intake per family- lives in snf center where meals are provided            Anthropometrics       10/09/17 1452    Anthropometrics    RD Documented Current Weight  63.5 kg (140 lb)    Usual Body Weight (UBW)    Weight Loss 3.175 kg (7 lb)    Weight Loss Time Frame 5 months    Body Mass Index (BMI)    BMI Grade 19.1 - 24.9 - normal            Labs/Tests/Procedures/Meds       10/09/17 1453    Labs/Tests/Procedures/Meds    Diagnostic Test/Procedure Review reviewed, pertinent    Labs/Tests Review Reviewed;Glucose;Na+;Creat;BUN    Medication Review Reviewed, pertinent;Insulin;Laxative    Significant Vitals reviewed, pertinent            Physical Findings       10/09/17 1455    Physical Findings/Assessment    Additional Documentation Physical Appearance (Group)    Physical Appearance    Skin surgical wound            Estimated/Assessed Needs       10/09/17 1455    Calculation Measurements    Weight Used For Calculations 63.5 kg (140 lb)    Height Used for Calculations 1.626 m (5' 4\")    Estimated/Assessed Energy Needs    Energy Need Method Kcal/kg    " kcal/kg 25    25 Kcal/Kg (kcal) 1587.6    Estimated/Assessed Protein Needs    Weight Used for Protein Calculation 63.5 kg (140 lb)    Protein (gm/kg) 1.0    1.0 Gm Protein (gm) 63.5    Estimated/Assessed Fluid Needs    Fluid Need Method RDA method    RDA Method (mL)  1588            Nutrition Prescription Ordered       10/09/17 1455    Nutrition Prescription PO    Current PO Diet Regular    Common Modifiers Cardiac;Consistent Carbohydrate            Evaluation of Received Nutrient/Fluid Intake       10/09/17 1456    PO Evaluation    Number of Days PO Intake Evaluated Insufficient Data            Problem/Interventions:        Problem 1       10/09/17 1457    Nutrition Diagnoses Problem 1    Problem 1 Knowledge Deficit    Etiology (related to) MNT for Treatment/Condition    Signs/Symptoms (evidenced by) Potential Information Deficit;Report/Observation    Reported/Observed By Family    Appetite Poor at this time    Food Habit/Pref --   lives in half-way center    Mental State/Condition Confusion;Weakness                    Intervention Goal       10/09/17 1458    Intervention Goal    General Maintain nutrition    PO Tolerate PO;Increase intake;PO intake (%)    PO Intake % 75 %    Weight Maintain weight            Nutrition Intervention       10/09/17 1458    Nutrition Intervention    RD/Tech Action Advise available snack;Interview for preference;Encourage intake;Recommend/ordered;Supplement provided;Follow Tx progress;Care plan reviewd    Recommended/Ordered Supplement            Nutrition Prescription       10/09/17 1459    Nutrition Prescription PO    PO Prescription Begin/change supplement    Supplement Glucerna Shake    Supplement Frequency 3 times a day    New PO Prescription Ordered? Yes            Education/Evaluation       10/09/17 1459    Education    Education Provided education regarding;Education topics    Provided education regarding Diet rationale;Healthy eating for diabetes   to family    Education  Topics Other (comment)   healthy selections in [facility] home    Monitor/Evaluation    Monitor Per protocol        Electronically signed by:  Ethel Josue RD  10/09/17 3:01 PM

## 2017-10-09 NOTE — PROGRESS NOTES
"Jada Keith  4/10/1926 89 y.o.  [id number]      Patient Care Team:  Son Wall MD as PCP - General  Son Wall MD as PCP - Family Medicine  Charli Wakefield MD as PCP - Claims Attributed    CC: Hip fracture, severe cardiomyopathy, left bundle branch block     Interval History: She looks good this morning no chest pain shortness of breath      Objective   Vital Signs  Temp:  [97.1 °F (36.2 °C)-98.7 °F (37.1 °C)] 97.9 °F (36.6 °C)  Heart Rate:  [] 79  Resp:  [14-18] 18  BP: (111-140)/(46-71) 113/49  Arterial Line BP: (144-159)/(53-69) 144/53    Intake/Output Summary (Last 24 hours) at 10/09/17 0911  Last data filed at 10/09/17 0700   Gross per 24 hour   Intake           3287.5 ml   Output             1725 ml   Net           1562.5 ml     Flowsheet Rows         First Filed Value    Admission Height  64\" (162.6 cm) Documented at 10/07/2017 1037    Admission Weight  140 lb (63.5 kg) Documented at 10/07/2017 1037          Physical Exam:   General Appearance:    Alert,oriented, in no acute distress   Lungs:     Clear to auscultation,BS are equal    Heart:    Normal S1 and S2, RRR without murmur, gallop or rub   HEENT:    Sclera are clear, no JVD or adenopathy   Abdomen:     Normal bowel sounds, soft non-tender, non-distended, no HSM   Extremities:   Moves all extremities well, no edema, no cyanosis, no             Redness, no rash     Medication Review:        aspirin 325 mg Oral Daily   calcium carbonate 500 mg Oral BID   ceftriaxone 1 g Intravenous Q24H   digoxin 125 mcg Oral Daily   donepezil 10 mg Oral Nightly   insulin aspart 0-9 Units Subcutaneous 4x Daily With Meals & Nightly   ivabradine HCl 7.5 mg Oral Daily With Breakfast & Dinner   metoprolol succinate XL 50 mg Oral Daily   polyethylene glycol 17 g Oral Daily       sodium chloride 75 mL/hr Last Rate: Stopped (10/09/17 0302)         I reviewed the patient's new clinical results.  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       She looks euvolemic and stable postoperatively think she could even go to the orthopedic floor she is a DNR and I will resume that I would continue her current medications    Son Jasmine MD  10/09/17  9:11 AM

## 2017-10-09 NOTE — PLAN OF CARE
Problem: Fractured Hip (Adult)  Goal: Signs and Symptoms of Listed Potential Problems Will be Absent or Manageable (Fractured Hip)  Outcome: Ongoing (interventions implemented as appropriate)    Problem: Fall Risk (Adult)  Goal: Identify Related Risk Factors and Signs and Symptoms  Outcome: Ongoing (interventions implemented as appropriate)  Goal: Absence of Falls  Outcome: Ongoing (interventions implemented as appropriate)

## 2017-10-09 NOTE — PLAN OF CARE
Problem: Patient Care Overview (Adult)  Goal: Plan of Care Review  Outcome: Ongoing (interventions implemented as appropriate)    10/09/17 1708   Coping/Psychosocial Response Interventions   Plan Of Care Reviewed With patient   Patient Care Overview   Progress no change   Outcome Evaluation   Outcome Summary/Follow up Plan Not voiding this shift. Bladder scan showing minimal urine this afternoon. Blood sugar elevated, poor po intake. Discussed with MD - orders noted. Cont to monitor.         Problem: Fractured Hip (Adult)  Goal: Signs and Symptoms of Listed Potential Problems Will be Absent or Manageable (Fractured Hip)  Outcome: Ongoing (interventions implemented as appropriate)    Problem: Fall Risk (Adult)  Goal: Identify Related Risk Factors and Signs and Symptoms  Outcome: Outcome(s) achieved Date Met:  10/09/17  Goal: Absence of Falls  Outcome: Ongoing (interventions implemented as appropriate)    Problem: Perioperative Period (Adult)  Goal: Signs and Symptoms of Listed Potential Problems Will be Absent or Manageable (Perioperative Period)  Outcome: Outcome(s) achieved Date Met:  10/09/17

## 2017-10-09 NOTE — DISCHARGE INSTR - PHARMACY
Confirmed with daughter, patient uses [facility]

## 2017-10-10 LAB
ANION GAP SERPL CALCULATED.3IONS-SCNC: 9.8 MMOL/L
BUN BLD-MCNC: 18 MG/DL (ref 8–23)
BUN/CREAT SERPL: 15.9 (ref 7–25)
CALCIUM SPEC-SCNC: 9.6 MG/DL (ref 8.2–9.6)
CHLORIDE SERPL-SCNC: 96 MMOL/L (ref 98–107)
CO2 SERPL-SCNC: 24.2 MMOL/L (ref 22–29)
CREAT BLD-MCNC: 1.13 MG/DL (ref 0.57–1)
DEPRECATED RDW RBC AUTO: 48.4 FL (ref 37–54)
ERYTHROCYTE [DISTWIDTH] IN BLOOD BY AUTOMATED COUNT: 14.4 % (ref 11.7–13)
GFR SERPL CREATININE-BSD FRML MDRD: 45 ML/MIN/1.73
GLUCOSE BLD-MCNC: 250 MG/DL (ref 65–99)
GLUCOSE BLDC GLUCOMTR-MCNC: 276 MG/DL (ref 70–130)
GLUCOSE BLDC GLUCOMTR-MCNC: 308 MG/DL (ref 70–130)
GLUCOSE BLDC GLUCOMTR-MCNC: 318 MG/DL (ref 70–130)
GLUCOSE BLDC GLUCOMTR-MCNC: 322 MG/DL (ref 70–130)
HCT VFR BLD AUTO: 28.1 % (ref 35.6–45.5)
HGB BLD-MCNC: 8.9 G/DL (ref 11.9–15.5)
MCH RBC QN AUTO: 29.2 PG (ref 26.9–32)
MCHC RBC AUTO-ENTMCNC: 31.7 G/DL (ref 32.4–36.3)
MCV RBC AUTO: 92.1 FL (ref 80.5–98.2)
PLATELET # BLD AUTO: 169 10*3/MM3 (ref 140–500)
PMV BLD AUTO: 10.2 FL (ref 6–12)
POTASSIUM BLD-SCNC: 4.8 MMOL/L (ref 3.5–5.2)
PROCALCITONIN SERPL-MCNC: 0.44 NG/ML (ref 0.1–0.25)
RBC # BLD AUTO: 3.05 10*6/MM3 (ref 3.9–5.2)
SODIUM BLD-SCNC: 130 MMOL/L (ref 136–145)
WBC NRBC COR # BLD: 19.99 10*3/MM3 (ref 4.5–10.7)

## 2017-10-10 PROCEDURE — 63710000001 INSULIN ASPART PER 5 UNITS: Performed by: HOSPITALIST

## 2017-10-10 PROCEDURE — 85027 COMPLETE CBC AUTOMATED: CPT | Performed by: HOSPITALIST

## 2017-10-10 PROCEDURE — 80048 BASIC METABOLIC PNL TOTAL CA: CPT | Performed by: HOSPITALIST

## 2017-10-10 PROCEDURE — 97110 THERAPEUTIC EXERCISES: CPT

## 2017-10-10 PROCEDURE — 84145 PROCALCITONIN (PCT): CPT | Performed by: HOSPITALIST

## 2017-10-10 PROCEDURE — 99232 SBSQ HOSP IP/OBS MODERATE 35: CPT | Performed by: INTERNAL MEDICINE

## 2017-10-10 PROCEDURE — 82962 GLUCOSE BLOOD TEST: CPT

## 2017-10-10 PROCEDURE — 63710000001 INSULIN DETEMER PER 5 UNITS: Performed by: HOSPITALIST

## 2017-10-10 RX ADMIN — INSULIN ASPART 8 UNITS: 100 INJECTION, SOLUTION INTRAVENOUS; SUBCUTANEOUS at 21:34

## 2017-10-10 RX ADMIN — HYDROCODONE BITARTRATE AND ACETAMINOPHEN 1 TABLET: 5; 325 TABLET ORAL at 19:36

## 2017-10-10 RX ADMIN — SODIUM CHLORIDE 75 ML/HR: 9 INJECTION, SOLUTION INTRAVENOUS at 05:12

## 2017-10-10 RX ADMIN — METOPROLOL SUCCINATE 25 MG: 25 TABLET, FILM COATED, EXTENDED RELEASE ORAL at 08:57

## 2017-10-10 RX ADMIN — INSULIN DETEMIR 20 UNITS: 100 INJECTION, SOLUTION SUBCUTANEOUS at 21:34

## 2017-10-10 RX ADMIN — SENNOSIDES AND DOCUSATE SODIUM 2 TABLET: 8.6; 5 TABLET ORAL at 20:05

## 2017-10-10 RX ADMIN — INSULIN ASPART 7 UNITS: 100 INJECTION, SOLUTION INTRAVENOUS; SUBCUTANEOUS at 18:31

## 2017-10-10 RX ADMIN — INSULIN ASPART 7 UNITS: 100 INJECTION, SOLUTION INTRAVENOUS; SUBCUTANEOUS at 12:24

## 2017-10-10 RX ADMIN — POLYETHYLENE GLYCOL 3350 17 G: 17 POWDER, FOR SOLUTION ORAL at 08:57

## 2017-10-10 RX ADMIN — HYDROCODONE BITARTRATE AND ACETAMINOPHEN 1 TABLET: 5; 325 TABLET ORAL at 05:07

## 2017-10-10 RX ADMIN — DONEPEZIL HYDROCHLORIDE 10 MG: 10 TABLET, FILM COATED ORAL at 20:05

## 2017-10-10 RX ADMIN — CALCIUM 500 MG: 500 TABLET ORAL at 18:31

## 2017-10-10 RX ADMIN — HYDROCODONE BITARTRATE AND ACETAMINOPHEN 1 TABLET: 5; 325 TABLET ORAL at 14:59

## 2017-10-10 RX ADMIN — DIGOXIN 125 MCG: 0.12 TABLET ORAL at 12:24

## 2017-10-10 RX ADMIN — INSULIN ASPART 6 UNITS: 100 INJECTION, SOLUTION INTRAVENOUS; SUBCUTANEOUS at 08:56

## 2017-10-10 RX ADMIN — CALCIUM 500 MG: 500 TABLET ORAL at 08:57

## 2017-10-10 RX ADMIN — ASPIRIN 325 MG: 325 TABLET ORAL at 08:57

## 2017-10-10 NOTE — THERAPY TREATMENT NOTE
Acute Care - Physical Therapy Treatment Note  [facility]     Patient Name: Jada Keith  : 4/10/1926  MRN: [id number]  Today's Date: 10/10/2017  Onset of Illness/Injury or Date of Surgery Date: 10/07/17          Admit Date: 10/7/2017    Visit Dx:    ICD-10-CM ICD-9-CM   1. Fall, initial encounter W19.XXXA E888.9   2. Closed subcapital fracture of right femur, initial encounter S72.011A 820.09   3. 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   • Chronic combined systolic and diastolic heart failure   • Collapsed vertebra, not elsewhere classified, thoracic region, initial encounter for fracture   • Diastolic dysfunction   • Diffuse non-Hodgkin's lymphoblastic lymphoma   • Diverticulosis of intestine   • Uterine leiomyoma   • Gastroesophageal reflux disease   • Memory loss   • Moderate mitral regurgitation   • Obstructive sleep apnea syndrome   • Osteoporosis with pathological fracture   • Paroxysmal supraventricular tachycardia   • Rectal disorder   • Sinus tachycardia   • Acute systolic (congestive) heart failure   • Acute pulmonary edema   • Pedal edema   • Acute respiratory failure with hypoxemia   • Pleural effusion on right   • Essential hypertension   • Acute kidney injury   • Closed subcapital fracture of left femur   • Leukocytosis   • DM type 2 (diabetes mellitus, type 2), new diagnosis   • Hyponatremia               Adult Rehabilitation Note       10/10/17 1609          Rehab Assessment/Intervention    Discipline physical therapist  -EE      Document Type therapy note (daily note)  -EE      Subjective Information agree to therapy;complains of;pain  -EE      Patient Effort, Rehab Treatment good  -EE      Symptoms Noted During/After Treatment none  -EE      Precautions/Limitations fall precautions;hip precautions- right  -EE      Specific Treatment Considerations Per RNLakesha ok for  short distance ambulation in room (to/from bathroom)  -EE      Recorded by [EE] Meche Linares, PT      Pain Assessment    Pain Assessment FLACC  -EE      Champion-Sena FACES Pain Rating 4  -EE      Pain Type Surgical pain  -EE      Pain Location Hip  -EE      Pain Orientation Right  -EE      Pain Intervention(s) Repositioned;Medication (See MAR)  -EE      Response to Interventions tolerated  -EE      Recorded by [EE] Meche Linares, PT      Cognitive Assessment/Intervention    Current Cognitive/Communication Assessment impaired  -EE      Orientation Status oriented to;person;place  -EE      Follows Commands/Answers Questions 75% of the time;able to follow single-step instructions;needs cueing  -EE      Personal Safety mild impairment;at risk behaviors demonstrated;decreased insight to deficits  -EE      Personal Safety Interventions fall prevention program maintained;gait belt;muscle strengthening facilitated;nonskid shoes/slippers when out of bed;supervised activity  -EE      Recorded by [EE] Meche Linares, PT      Mobility Assessment/Training    Extremity Weight-Bearing Status right lower extremity  -EE      Right Lower Extremity Weight-Bearing weight-bearing as tolerated  -EE      Recorded by [EE] Meche Linares, PT      Bed Mobility, Assessment/Treatment    Bed Mobility, Comment up in chair  -EE      Recorded by [EE] Meche Linares, PT      Transfer Assessment/Treatment    Transfers, Sit-Stand [place] minimum assist (75% patient effort);1 person + 1 person to manage equipment;verbal cues required  -EE      Transfers, Stand-Sit [place] contact guard assist;verbal cues required  -EE      Transfers, Sit-Stand-Sit, Assist Device rolling walker  -EE      Transfer, Safety Issues balance decreased during turns;sequencing ability decreased;step length decreased;weight-shifting ability decreased  -EE      Transfer, Impairments strength decreased;impaired balance;pain  -EE      Transfer, Comment verbal cues to maintain hip  precautions  -EE      Recorded by [EE] Meche Linares, CHRISTIAN      Gait Assessment/Treatment    Gait, [place] Level minimum assist (75% patient effort);verbal cues required  -EE      Gait, Assistive Device rolling walker  -EE      Gait, Distance (Feet) 20  -EE      Gait, Gait Pattern Analysis swing-to gait  -EE      Gait, Gait Deviations forward flexed posture;kaveh decreased;right:;decreased heel strike;antalgic;step length decreased;weight-shifting ability decreased  -EE      Gait, Safety Issues balance decreased during turns;sequencing ability decreased;step length decreased;weight-shifting ability decreased  -EE      Gait, Impairments strength decreased;impaired balance;pain  -EE      Gait, Comment ok for short distance ambulation in room per Lakesha GALINDO. Verbal cues required for gait sequencing.  -EE      Recorded by [EE] Meche Linares PT      Therapy Exercises    Exercise Protocols total hip  -EE      Total Hip Exercises right:;10 reps;completed protocol  -EE      Recorded by [EE] Meche Linares PT      Positioning and Restraints    Pre-Treatment Position sitting in chair/recliner  -EE      Post Treatment Position chair  -EE      In Chair reclined;call light within reach;encouraged to call for assist;with family/caregiver;legs elevated;pillow between legs  -EE      Recorded by [EE] Meche Linares PT        User Key  (r) = Recorded By, (t) = Taken By, (c) = Cosigned By    Initials Name Effective Dates    EE Meche Linares PT 12/01/15 -                 IP PT Goals       10/09/17 1621          Bed Mobility PT LTG    Bed Mobility PT LTG, Date Established 10/09/17  -DM      Bed Mobility PT LTG, Time to Achieve 1 wk  -DM      Bed Mobility PT LTG, Activity Type all bed mobility  -DM      Bed Mobility PT LTG, [place] Level contact guard assist  -DM      Bed Mobility PT Goal  LTG, Assist Device bed rails  -DM      Transfer Training PT LTG    Transfer Training PT LTG, Date Established 10/09/17  -DM      Transfer Training PT  LTG, Time to Achieve 1 wk  -DM      Transfer Training PT LTG, Activity Type bed to chair /chair to bed;sit to stand/stand to sit  -DM      Transfer Training PT LTG, [place] Level contact guard assist  -DM      Transfer Training PT LTG, Assist Device walker, rolling  -DM      Gait Training PT LTG    Gait Training Goal PT LTG, Date Established 10/09/17  -DM      Gait Training Goal PT LTG, [place] Level contact guard assist  -DM      Gait Training Goal PT LTG, Assist Device walker, rolling  -DM      Gait Training Goal PT LTG, Distance to Achieve awaiting clarification from ortho then will be 100'  -DM        User Key  (r) = Recorded By, (t) = Taken By, (c) = Cosigned By    Initials Name Provider Type    ESCOBAR Church, PT Physical Therapist          Physical Therapy Education     Title: PT OT SLP Therapies (Active)     Topic: Physical Therapy (Active)     Point: Mobility training (Active)    Learning Progress Summary    Learner Readiness Method Response Comment Documented by Status   Patient Acceptance E,TB NR  EE 10/10/17 1621 Active    Acceptance E,TB,D NR  DM 10/09/17 1627 Active               Point: Home exercise program (Active)    Learning Progress Summary    Learner Readiness Method Response Comment Documented by Status   Patient Acceptance E,TB NR  EE 10/10/17 1621 Active    Acceptance E,TB,D NR  DM 10/09/17 1627 Active               Point: Body mechanics (Active)    Learning Progress Summary    Learner Readiness Method Response Comment Documented by Status   Patient Acceptance E,TB NR  EE 10/10/17 1621 Active    Acceptance E,TB,D NR  DM 10/09/17 1627 Active               Point: Precautions (Active)    Learning Progress Summary    Learner Readiness Method Response Comment Documented by Status   Patient Acceptance E,TB NR  EE 10/10/17 1621 Active    Acceptance E,TB,D NR  DM 10/09/17 1627 Active   Family Eager E,TB,D VU hip precautions & need for KI DM 10/09/17 1627 Done                      User Key      Initials Effective Dates Name Provider Type Discipline    DM 10/06/15 -  Debra Church, PT Physical Therapist PT    EE 12/01/15 -  Meche Linares, PT Physical Therapist PT                    PT Recommendation and Plan  Anticipated Discharge Disposition: skilled nursing facility  Planned Therapy Interventions: (S) balance training, bed mobility training, home exercise program, patient/family education, strengthening, transfer training, other (see comments) (gait not in MD orders, need to clarify)  PT Frequency: daily  Plan of Care Review  Plan Of Care Reviewed With: patient  Progress: improving  Outcome Summary/Follow up Plan: Pt demonstrating improved strength and endurance as evidenced by tolerance of increased gait distance. Continues to require assist of one for safety and cueing to maintain hip precautions.           Outcome Measures       10/10/17 1600 10/09/17 0936       How much help from another person do you currently need...    Turning from your back to your side while in flat bed without using bedrails? 3  -EE 3  -DM     Moving from lying on back to sitting on the side of a flat bed without bedrails? 3  -EE 3  -DM     Moving to and from a bed to a chair (including a wheelchair)? 3  -EE 3  -DM     Standing up from a chair using your arms (e.g., wheelchair, bedside chair)? 3  -EE 3  -DM     Climbing 3-5 steps with a railing? 1  -EE 1  -DM     To walk in hospital room? 3  -EE 2  -DM     AM-PAC 6 Clicks Score 16  -EE 15  -DM     Functional Assessment    Outcome Measure Options AM-PAC 6 Clicks Basic Mobility (PT)  -EE AM-PAC 6 Clicks Basic Mobility (PT)  -DM       User Key  (r) = Recorded By, (t) = Taken By, (c) = Cosigned By    Initials Name Provider Type    DM Debra Church, PT Physical Therapist    EE Meche Linares, PT Physical Therapist           Time Calculation:         PT Charges       10/10/17 1623          Time Calculation    Start Time 1409  -EE      Stop Time 1623  -EE      Time Calculation (min) 134  min  -EE      PT Received On 10/10/17  -EE      PT - Next Appointment 10/11/17  -EE        User Key  (r) = Recorded By, (t) = Taken By, (c) = Cosigned By    Initials Name Provider Type    STEPHAN Linares PT Physical Therapist          Therapy Charges for Today     Code Description Service Date Service Provider Modifiers Qty    65608083511 HC PT THER PROC EA 15 MIN 10/10/2017 Meche Linares, PT GP 1    18393999615 HC PT THER SUPP EA 15 MIN 10/10/2017 Meche Linares PT GP 1          PT G-Codes  Outcome Measure Options: AM-PAC 6 Clicks Basic Mobility (PT)    Meche Linares PT  10/10/2017

## 2017-10-10 NOTE — PLAN OF CARE
Problem: Patient Care Overview (Adult)  Goal: Plan of Care Review    10/10/17 1621   Coping/Psychosocial Response Interventions   Plan Of Care Reviewed With patient   Patient Care Overview   Progress improving   Outcome Evaluation   Outcome Summary/Follow up Plan Pt demonstrating improved strength and endurance as evidenced by tolerance of increased gait distance. Continues to require assist of one for safety and cueing to maintain hip precautions.

## 2017-10-10 NOTE — PLAN OF CARE
Problem: Patient Care Overview (Adult)  Goal: Plan of Care Review  Outcome: Ongoing (interventions implemented as appropriate)    10/10/17 0341   Coping/Psychosocial Response Interventions   Plan Of Care Reviewed With patient   Patient Care Overview   Progress improving   Outcome Evaluation   Outcome Summary/Follow up Plan HTN well controlled on po meds. pt started on long lasting insulin injection tonight for elevated blood sugars. pain well controlled.          Problem: Fall Risk (Adult)  Goal: Absence of Falls  Outcome: Ongoing (interventions implemented as appropriate)    10/10/17 0341   Fall Risk (Adult)   Absence of Falls making progress toward outcome

## 2017-10-10 NOTE — PROGRESS NOTES
"Jada Keith  4/10/1926 89 y.o.  [id number]      Patient Care Team:  Alicia Leger MD as PCP - General (Family Medicine)  Charli Wakefield MD as PCP - Claims Attributed    CC: Hip fracture, severe cardiomyopathy, left bundle branch block     Interval History: She looks good this morning no chest pain shortness of breath      Objective   Vital Signs  Temp:  [96.2 °F (35.7 °C)-99.9 °F (37.7 °C)] 98 °F (36.7 °C)  Heart Rate:  [64-99] 76  Resp:  [16-18] 16  BP: ()/(38-59) 116/57    Intake/Output Summary (Last 24 hours) at 10/10/17 1312  Last data filed at 10/10/17 1224   Gross per 24 hour   Intake              750 ml   Output              825 ml   Net              -75 ml     Flowsheet Rows         First Filed Value    Admission Height  64\" (162.6 cm) Documented at 10/07/2017 1037    Admission Weight  140 lb (63.5 kg) Documented at 10/07/2017 1037          Physical Exam:   General Appearance:    Alert,oriented, in no acute distress   Lungs:     Clear to auscultation,BS are equal    Heart:    Normal S1 and S2, RRR without murmur, gallop or rub   HEENT:    Sclera are clear, no JVD or adenopathy   Abdomen:     Normal bowel sounds, soft non-tender, non-distended, no HSM   Extremities:   Moves all extremities well, no edema, no cyanosis, no             Redness, no rash     Medication Review:        aspirin 325 mg Oral Daily   calcium carbonate 500 mg Oral BID   digoxin 125 mcg Oral Daily   donepezil 10 mg Oral Nightly   insulin aspart 0-9 Units Subcutaneous 4x Daily With Meals & Nightly   insulin detemir 20 Units Subcutaneous Nightly   ivabradine HCl 7.5 mg Oral Daily With Breakfast & Dinner   metoprolol succinate XL 25 mg Oral Daily   polyethylene glycol 17 g Oral Daily   sennosides-docusate sodium 2 tablet Oral Nightly            I reviewed the patient's new clinical results.  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       She looks euvolemic and stable postoperatively.  Seems stable from a cardiac standpoint we will plan on seeing her as needed I am a little bit uncomfortable with the digoxin in this 89-year-old lady and I think her regular physicians could look in that may be getting her off that seen if her rate will be controlled just with beta blockade    Son Jasmine MD  10/10/17  1:12 PM

## 2017-10-10 NOTE — NURSING NOTE
"Diabetes Education  Assessment/Teaching    Patient Name:  Jada Keith  YOB: 1926  MRN: [id number]  Admit Date:  10/7/2017      Assessment Date:  10/10/2017       Most Recent Value    General Information      Referral From:  A1c    Height  5' 4\" (1.626 m)    Height Method  Estimated    Weight  140 lb (63.5 kg)    What type of diabetes do you have?  Type 2 [presumably. ]    Length of Diabetes Diagnosis  -- [newly dx'd this admission. ]    Do you test your blood sugar at home?  no    Barriers to Learning  -- [pls note DM ed performed w/two pt daughters d/t pt's (short-term) memory impairment.]    Nutrition Information Has been seen by RD this stay.    Assessment Topics     Taking Medication - Assessment  Needs education    Problem Solving - Assessment  Needs education    Healthy Coping - Assessment  Competent            Most Recent Value    DM Education Needs     Meter  -- [pt's daughters defer BG meter training due to the fact that pt will first go to rehab before returning home. ]    Medication  Insulin, Oral, Pen, Administration [Discuss use of Levemir, metformin. Teach insulin pen use.]    Problem Solving  Hyperglycemia, Symptoms    Healthy Coping  Appropriate    Discharge Plan  Facility, Follow-up with PCP [plan at this time is transfer to rehab when cleared. ]    Teaching Method  Explanation, Discussion, Handouts    Patient Response  Verbalized understanding     Daughters encouraged to have staff contact DM educator for further questions.     Electronically signed by:  Uzma Hernandez, RN, BSN, CDE   10/10/17 12:30 PM  "

## 2017-10-10 NOTE — PROGRESS NOTES
Name: Jada Keith ADMIT: 10/7/2017   : 4/10/1926  PCP: Alicia Leger MD    MRN: [id number] LOS: 3 days   AGE/SEX: 89 y.o. female  ROOM: [facility]     Subjective   Subjective:  Symptoms:  Improved.    Diet:  Adequate intake.  No nausea.    Activity level: Returning to normal.    Pain:  She complains of pain that is mild.  She reports pain is improving.  Pain is well controlled and requiring pain medication.        Objective   Vital Signs  Temp:  [96.2 °F (35.7 °C)-99.9 °F (37.7 °C)] 96.2 °F (35.7 °C)  Heart Rate:  [64-99] 95  Resp:  [16-20] 16  BP: ()/(38-59) 111/59  SpO2:  [92 %-96 %] 96 %  on   ;   O2 Device: room air  Body mass index is 24.03 kg/(m^2).    Physical Exam   Constitutional: She is oriented to person, place, and time. No distress.   Cardiovascular: Normal rate and regular rhythm.    No murmur heard.  Pulmonary/Chest: Effort normal and breath sounds normal.   Abdominal: Soft. Bowel sounds are normal. She exhibits no distension. There is no tenderness.   Musculoskeletal: Normal range of motion. She exhibits no edema.   Neurological: She is alert and oriented to person, place, and time.   Skin: Skin is warm and dry. She is not diaphoretic.       Results Review:       I reviewed the patient's new clinical results.    Results from last 7 days  Lab Units 10/10/17  0344 10/09/17  0420 10/08/17  0655 10/07/17  1338   WBC 10*3/mm3 19.99* 17.91* 14.69* 13.86*   HEMOGLOBIN g/dL 8.9* 10.3* 12.2 13.2   PLATELETS 10*3/mm3 169 179 203 196       Results from last 7 days  Lab Units 10/10/17  0344 10/09/17  0420 10/08/17  0655 10/07/17  1259   SODIUM mmol/L 130* 132* 133* 133*   POTASSIUM mmol/L 4.8 4.6  4.6 4.6 4.5   CHLORIDE mmol/L 96* 98 96* 94*   CO2 mmol/L 24.2 21.4* 27.0 25.3   BUN mg/dL 18 16 21 29*   CREATININE mg/dL 1.13* 1.08* 1.03* 1.39*   GLUCOSE mg/dL 250* 348* 285* 280*   Estimated Creatinine Clearance: 32.5 mL/min (by C-G formula based on Cr of 1.13).    Results from last 7 days  Lab Units  10/10/17  0344 10/09/17  0420 10/08/17  0655 10/07/17  1259   CALCIUM mg/dL 9.6 8.2 9.0 9.1   ALBUMIN g/dL  --   --   --  3.70   MAGNESIUM mg/dL  --  1.8  --   --        Results from last 7 days  Lab Units 10/10/17  0344   PROCALCITONIN ng/mL 0.44*       Results from last 7 days  Lab Units 10/07/17  1722   NITRITE UA  Negative   WBC UA /HPF 31-50*   BACTERIA UA /HPF 1+*   SQUAM EPITHEL UA /HPF 0-2   URINECX  <25,000 CFU/mL Mixed Trini Isolated         aspirin 325 mg Oral Daily   calcium carbonate 500 mg Oral BID   digoxin 125 mcg Oral Daily   donepezil 10 mg Oral Nightly   insulin aspart 0-9 Units Subcutaneous 4x Daily With Meals & Nightly   insulin detemir 20 Units Subcutaneous Nightly   ivabradine HCl 7.5 mg Oral Daily With Breakfast & Dinner   metoprolol succinate XL 25 mg Oral Daily   polyethylene glycol 17 g Oral Daily   sennosides-docusate sodium 2 tablet Oral Nightly       sodium chloride 75 mL/hr Last Rate: 75 mL/hr (10/10/17 0512)         Assessment/Plan   Assessment:     Active Hospital Problems (** Indicates Principal Problem)    Diagnosis Date Noted   • **Closed subcapital fracture of left femur [S72.012A] 10/07/2017   • Hyponatremia [E87.1] 10/09/2017   • DM type 2 (diabetes mellitus, type 2), new diagnosis [E11.9] 10/08/2017   • Acute kidney injury [N17.9] 10/07/2017   • Leukocytosis [D72.829] 10/07/2017   • Chronic combined systolic and diastolic heart failure [I50.42] 04/30/2017   • 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       ¨ POD#2 CEMENTED BIPOLAR  ¨ Seems stable cardiac wise postop.  ¨ LORTAB 5/325 mg PO q6h prn pain.  ¨ Continue MiraLAX and  Senokot prn for constipation.  ¨ DVT prophylaxis per ortho ( mg a day. When complete resume 81 mg daily)  ¨ Physical therapy to improve mobility and range of motion.  ¨ Patient encouraged to use incentive spirometer as instructed.  ¨ Patient on calcium and will continue after discharge.  ¨ Recommend bisphosphonate therapy, such as Alendronate, for osteoporosis post discharge.  Should also have bone densitometry to establish baseline and monitor treatment response.  Will defer this to the outpatient setting with PCP.  ¨ Leukocytosis suspect is reactive. Monitor. Doubt UTI given culture results. CXR negative. Off Rocephin. Afebrile. Nontoxic.  ¨ Watch Na+.   ¨ A1c 10.95. Levemir started yesterday. Change to moderate high dose algorithm.  Prefer not to start insulin in a 89yo but seems likely she will need to transfer to SNU on at least daily Levemir.       Disposition  · Will need SNU.       Gilles Helms MD  [facility]  10/10/17  11:10 AM

## 2017-10-10 NOTE — PLAN OF CARE
Problem: Patient Care Overview (Adult)  Goal: Plan of Care Review  Outcome: Ongoing (interventions implemented as appropriate)    10/10/17 8216   Coping/Psychosocial Response Interventions   Plan Of Care Reviewed With patient;daughter   Patient Care Overview   Progress improving   Outcome Evaluation   Outcome Summary/Follow up Plan VSS, less pain, moved well with walker. Comorbid risk is diabetes which is a new diagnosis. Pt educated on diet intake and importance of cheching BGL. Pt will be going to facility and family at  understands and agrees with plan.        Goal: Adult Individualization and Mutuality  Outcome: Ongoing (interventions implemented as appropriate)  Goal: Discharge Needs Assessment  Outcome: Ongoing (interventions implemented as appropriate)    Problem: Fractured Hip (Adult)  Goal: Signs and Symptoms of Listed Potential Problems Will be Absent or Manageable (Fractured Hip)  Outcome: Ongoing (interventions implemented as appropriate)    Problem: Fall Risk (Adult)  Goal: Absence of Falls  Outcome: Ongoing (interventions implemented as appropriate)

## 2017-10-10 NOTE — PLAN OF CARE
Problem: Patient Care Overview (Adult)  Goal: Plan of Care Review  Outcome: Ongoing (interventions implemented as appropriate)    10/10/17 1225   Coping/Psychosocial Response Interventions   Plan Of Care Reviewed With daughter   Patient Care Overview   Progress no change   Outcome Evaluation   Outcome Summary/Follow up Plan meet with two of pt's daughters on unit today for DM ed for new dx. Plan at this time is transfer to snf when ready.

## 2017-10-11 PROBLEM — S72.011A CLOSED SUBCAPITAL FRACTURE OF RIGHT FEMUR (HCC): Status: ACTIVE | Noted: 2017-10-07

## 2017-10-11 LAB
ANION GAP SERPL CALCULATED.3IONS-SCNC: 9.1 MMOL/L
BUN BLD-MCNC: 18 MG/DL (ref 8–23)
BUN/CREAT SERPL: 20 (ref 7–25)
CALCIUM SPEC-SCNC: 10.3 MG/DL (ref 8.2–9.6)
CHLORIDE SERPL-SCNC: 96 MMOL/L (ref 98–107)
CO2 SERPL-SCNC: 24.9 MMOL/L (ref 22–29)
CREAT BLD-MCNC: 0.9 MG/DL (ref 0.57–1)
DEPRECATED RDW RBC AUTO: 48 FL (ref 37–54)
ERYTHROCYTE [DISTWIDTH] IN BLOOD BY AUTOMATED COUNT: 14.3 % (ref 11.7–13)
GFR SERPL CREATININE-BSD FRML MDRD: 59 ML/MIN/1.73
GLUCOSE BLD-MCNC: 262 MG/DL (ref 65–99)
GLUCOSE BLDC GLUCOMTR-MCNC: 244 MG/DL (ref 70–130)
GLUCOSE BLDC GLUCOMTR-MCNC: 262 MG/DL (ref 70–130)
GLUCOSE BLDC GLUCOMTR-MCNC: 263 MG/DL (ref 70–130)
GLUCOSE BLDC GLUCOMTR-MCNC: 313 MG/DL (ref 70–130)
HCT VFR BLD AUTO: 26 % (ref 35.6–45.5)
HGB BLD-MCNC: 8.4 G/DL (ref 11.9–15.5)
MCH RBC QN AUTO: 29.8 PG (ref 26.9–32)
MCHC RBC AUTO-ENTMCNC: 32.3 G/DL (ref 32.4–36.3)
MCV RBC AUTO: 92.2 FL (ref 80.5–98.2)
PLATELET # BLD AUTO: 179 10*3/MM3 (ref 140–500)
PMV BLD AUTO: 9.8 FL (ref 6–12)
POTASSIUM BLD-SCNC: 4.7 MMOL/L (ref 3.5–5.2)
PROCALCITONIN SERPL-MCNC: 0.28 NG/ML (ref 0.1–0.25)
RBC # BLD AUTO: 2.82 10*6/MM3 (ref 3.9–5.2)
SODIUM BLD-SCNC: 130 MMOL/L (ref 136–145)
WBC NRBC COR # BLD: 15.38 10*3/MM3 (ref 4.5–10.7)

## 2017-10-11 PROCEDURE — 82962 GLUCOSE BLOOD TEST: CPT

## 2017-10-11 PROCEDURE — 97110 THERAPEUTIC EXERCISES: CPT

## 2017-10-11 PROCEDURE — 80048 BASIC METABOLIC PNL TOTAL CA: CPT | Performed by: HOSPITALIST

## 2017-10-11 PROCEDURE — 85027 COMPLETE CBC AUTOMATED: CPT | Performed by: HOSPITALIST

## 2017-10-11 PROCEDURE — 84145 PROCALCITONIN (PCT): CPT | Performed by: HOSPITALIST

## 2017-10-11 PROCEDURE — 63710000001 INSULIN DETEMER PER 5 UNITS: Performed by: HOSPITALIST

## 2017-10-11 PROCEDURE — 63710000001 INSULIN ASPART PER 5 UNITS: Performed by: HOSPITALIST

## 2017-10-11 RX ORDER — HYDROCODONE BITARTRATE AND ACETAMINOPHEN 5; 325 MG/1; MG/1
2 TABLET ORAL EVERY 6 HOURS PRN
Qty: 40 TABLET | Refills: 0 | Status: SHIPPED | OUTPATIENT
Start: 2017-10-11 | End: 2017-10-18

## 2017-10-11 RX ORDER — ASPIRIN 325 MG
325 TABLET, DELAYED RELEASE (ENTERIC COATED) ORAL ONCE
Qty: 21 TABLET | Refills: 0 | Status: SHIPPED | OUTPATIENT
Start: 2017-10-11 | End: 2017-10-11

## 2017-10-11 RX ORDER — METFORMIN HYDROCHLORIDE 500 MG/1
500 TABLET, EXTENDED RELEASE ORAL
Status: DISCONTINUED | OUTPATIENT
Start: 2017-10-11 | End: 2017-10-12 | Stop reason: HOSPADM

## 2017-10-11 RX ORDER — GLIPIZIDE 10 MG/1
10 TABLET ORAL
Status: DISCONTINUED | OUTPATIENT
Start: 2017-10-11 | End: 2017-10-12 | Stop reason: HOSPADM

## 2017-10-11 RX ADMIN — METOPROLOL SUCCINATE 25 MG: 25 TABLET, FILM COATED, EXTENDED RELEASE ORAL at 08:10

## 2017-10-11 RX ADMIN — INSULIN ASPART 7 UNITS: 100 INJECTION, SOLUTION INTRAVENOUS; SUBCUTANEOUS at 11:41

## 2017-10-11 RX ADMIN — ASPIRIN 325 MG: 325 TABLET ORAL at 08:11

## 2017-10-11 RX ADMIN — DIGOXIN 125 MCG: 0.12 TABLET ORAL at 11:41

## 2017-10-11 RX ADMIN — INSULIN ASPART 6 UNITS: 100 INJECTION, SOLUTION INTRAVENOUS; SUBCUTANEOUS at 21:21

## 2017-10-11 RX ADMIN — INSULIN DETEMIR 20 UNITS: 100 INJECTION, SOLUTION SUBCUTANEOUS at 21:23

## 2017-10-11 RX ADMIN — HYDROCODONE BITARTRATE AND ACETAMINOPHEN 1 TABLET: 5; 325 TABLET ORAL at 12:50

## 2017-10-11 RX ADMIN — INSULIN ASPART 4 UNITS: 100 INJECTION, SOLUTION INTRAVENOUS; SUBCUTANEOUS at 17:28

## 2017-10-11 RX ADMIN — INSULIN ASPART 6 UNITS: 100 INJECTION, SOLUTION INTRAVENOUS; SUBCUTANEOUS at 08:11

## 2017-10-11 RX ADMIN — DONEPEZIL HYDROCHLORIDE 10 MG: 10 TABLET, FILM COATED ORAL at 21:25

## 2017-10-11 RX ADMIN — CALCIUM 500 MG: 500 TABLET ORAL at 08:12

## 2017-10-11 RX ADMIN — POLYETHYLENE GLYCOL 3350 17 G: 17 POWDER, FOR SOLUTION ORAL at 08:11

## 2017-10-11 RX ADMIN — CALCIUM 500 MG: 500 TABLET ORAL at 17:27

## 2017-10-11 RX ADMIN — GLIPIZIDE 10 MG: 10 TABLET ORAL at 17:27

## 2017-10-11 NOTE — PLAN OF CARE
Problem: Patient Care Overview (Adult)  Goal: Plan of Care Review  Outcome: Ongoing (interventions implemented as appropriate)    10/11/17 1528   Coping/Psychosocial Response Interventions   Plan Of Care Reviewed With patient   Patient Care Overview   Progress progress towards functional goals is fair   Outcome Evaluation   Outcome Summary/Follow up Plan patient required less assistance with transfers and ambulation today, requires mutliple verbal cues to complete exercises and for safety awareness and proper technique with transfers.

## 2017-10-11 NOTE — THERAPY TREATMENT NOTE
Acute Care - Physical Therapy Treatment Note  [facility]     Patient Name: Jada Keith  : 4/10/1926  MRN: [id number]  Today's Date: 10/11/2017  Onset of Illness/Injury or Date of Surgery Date: 10/07/17          Admit Date: 10/7/2017    Visit Dx:    ICD-10-CM ICD-9-CM   1. Fall, initial encounter W19.XXXA E888.9   2. Closed subcapital fracture of right femur, initial encounter S72.011A 820.09   3. 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   • Chronic combined systolic and diastolic heart failure   • Collapsed vertebra, not elsewhere classified, thoracic region, initial encounter for fracture   • Diastolic dysfunction   • Diffuse non-Hodgkin's lymphoblastic lymphoma   • Diverticulosis of intestine   • Uterine leiomyoma   • Gastroesophageal reflux disease   • Memory loss   • Moderate mitral regurgitation   • Obstructive sleep apnea syndrome   • Osteoporosis with pathological fracture   • Paroxysmal supraventricular tachycardia   • Rectal disorder   • Sinus tachycardia   • Acute systolic (congestive) heart failure   • Acute pulmonary edema   • Pedal edema   • Acute respiratory failure with hypoxemia   • Pleural effusion on right   • Essential hypertension   • Acute kidney injury   • Closed subcapital fracture of right femur   • Leukocytosis   • DM type 2 (diabetes mellitus, type 2), new diagnosis   • Hyponatremia               Adult Rehabilitation Note       10/11/17 1521 10/10/17 1609       Rehab Assessment/Intervention    Discipline physical therapist  -EM physical therapist  -EE     Document Type therapy note (daily note)  -EM therapy note (daily note)  -EE     Subjective Information agree to therapy;no complaints  -EM agree to therapy;complains of;pain  -EE     Patient Effort, Rehab Treatment good  -EM good  -EE     Symptoms Noted During/After Treatment  none  -EE      Precautions/Limitations fall precautions;hip precautions- right  -EM fall precautions;hip precautions- right  -EE     Specific Treatment Considerations  Per RNLakesha ok for short distance ambulation in room (to/from bathroom)  -EE     Recorded by [EM] Beba Sanches, PT [EE] Meche Linares PT     Pain Assessment    Pain Assessment 0-10  -EM FLACC  -EE     Champion-Sena FACES Pain Rating  4  -EE     Pain Score 2  -EM      Pain Type  Surgical pain  -EE     Pain Location Hip  -EM Hip  -EE     Pain Orientation Right  -EM Right  -EE     Pain Intervention(s) Medication (See MAR)  -EM Repositioned;Medication (See MAR)  -EE     Response to Interventions  tolerated  -EE     Recorded by [EM] Beba Sanches PT [EE] Meche Linares PT     Cognitive Assessment/Intervention    Current Cognitive/Communication Assessment  impaired  -EE     Orientation Status  oriented to;person;place  -EE     Follows Commands/Answers Questions  75% of the time;able to follow single-step instructions;needs cueing  -EE     Personal Safety  mild impairment;at risk behaviors demonstrated;decreased insight to deficits  -EE     Personal Safety Interventions  fall prevention program maintained;gait belt;muscle strengthening facilitated;nonskid shoes/slippers when out of bed;supervised activity  -EE     Recorded by  [EE] Meche Linares PT     Mobility Assessment/Training    Extremity Weight-Bearing Status right lower extremity  -EM right lower extremity  -EE     Right Lower Extremity Weight-Bearing weight-bearing as tolerated  -EM weight-bearing as tolerated  -EE     Recorded by [EM] Beba Sanches, PT [EE] Meche Linares PT     Bed Mobility, Assessment/Treatment    Bed Mob, Supine to Sit, [place] not tested  -EM      Bed Mob, Sit to Supine, [place] not tested  -EM      Bed Mobility, Comment up in chair   -EM up in chair  -EE     Recorded by [EM] Beba Sanches, PT [EE] Meche Linares PT     Transfer Assessment/Treatment    Transfers,  Sit-Stand [place] contact guard assist;verbal cues required  -EM minimum assist (75% patient effort);1 person + 1 person to manage equipment;verbal cues required  -EE     Transfers, Stand-Sit [place] contact guard assist;verbal cues required  -EM contact guard assist;verbal cues required  -EE     Transfers, Sit-Stand-Sit, Assist Device rolling walker  -EM rolling walker  -EE     Transfer, Safety Issues  balance decreased during turns;sequencing ability decreased;step length decreased;weight-shifting ability decreased  -EE     Transfer, Impairments  strength decreased;impaired balance;pain  -EE     Transfer, Comment  verbal cues to maintain hip precautions  -EE     Recorded by [EM] Beba Sanches, PT [EE] Meche Linares PT     Gait Assessment/Treatment    Gait, [place] Level contact guard assist  -EM minimum assist (75% patient effort);verbal cues required  -EE     Gait, Assistive Device rolling walker  -EM rolling walker  -EE     Gait, Distance (Feet) 25  -EM 20  -EE     Gait, Gait Pattern Analysis  swing-to gait  -EE     Gait, Gait Deviations forward flexed posture;kaveh decreased  -EM forward flexed posture;kaveh decreased;right:;decreased heel strike;antalgic;step length decreased;weight-shifting ability decreased  -EE     Gait, Safety Issues  balance decreased during turns;sequencing ability decreased;step length decreased;weight-shifting ability decreased  -EE     Gait, Impairments  strength decreased;impaired balance;pain  -EE     Gait, Comment  ok for short distance ambulation in room per Lakesha GALINDO. Verbal cues required for gait sequencing.  -EE     Recorded by [EM] Beba Sanches PT [EE] Meche Linares PT     Therapy Exercises    Exercise Protocols total hip  -EM total hip  -EE     Total Hip Exercises right:;10 reps;with assist  -EM right:;10 reps;completed protocol  -EE     Recorded by [EM] Beba Sanches PT [EE] Meche Linares PT     Positioning and Restraints    Pre-Treatment  Position sitting in chair/recliner  -EM sitting in chair/recliner  -EE     Post Treatment Position chair  -EM chair  -EE     In Chair reclined;call light within reach;with family/caregiver  -EM reclined;call light within reach;encouraged to call for assist;with family/caregiver;legs elevated;pillow between legs  -EE     Recorded by [EM] Beba Sanches, PT [EE] Meche Linares, PT       User Key  (r) = Recorded By, (t) = Taken By, (c) = Cosigned By    Initials Name Effective Dates    EE Meche Linares, PT 12/01/15 -     EM Beba aSnches, PT 12/01/15 -                 IP PT Goals       10/09/17 1621          Bed Mobility PT LTG    Bed Mobility PT LTG, Date Established 10/09/17  -DM      Bed Mobility PT LTG, Time to Achieve 1 wk  -DM      Bed Mobility PT LTG, Activity Type all bed mobility  -DM      Bed Mobility PT LTG, [place] Level contact guard assist  -DM      Bed Mobility PT Goal  LTG, Assist Device bed rails  -DM      Transfer Training PT LTG    Transfer Training PT LTG, Date Established 10/09/17  -DM      Transfer Training PT LTG, Time to Achieve 1 wk  -DM      Transfer Training PT LTG, Activity Type bed to chair /chair to bed;sit to stand/stand to sit  -DM      Transfer Training PT LTG, [place] Level contact guard assist  -DM      Transfer Training PT LTG, Assist Device walker, rolling  -DM      Gait Training PT LTG    Gait Training Goal PT LTG, Date Established 10/09/17  -DM      Gait Training Goal PT LTG, [place] Level contact guard assist  -DM      Gait Training Goal PT LTG, Assist Device walker, rolling  -DM      Gait Training Goal PT LTG, Distance to Achieve awaiting clarification from ortho then will be 100'  -DM        User Key  (r) = Recorded By, (t) = Taken By, (c) = Cosigned By    Initials Name Provider Type    ESCOBAR Church PT Physical Therapist          Physical Therapy Education     Title: PT OT SLP Therapies (Active)     Topic: Physical Therapy (Active)     Point: Mobility  training (Active)    Learning Progress Summary    Learner Readiness Method Response Comment Documented by Status   Patient Acceptance E NR  EM 10/11/17 1527 Active    Acceptance E,TB NR  EE 10/10/17 1621 Active    Acceptance E,TB,D NR  DM 10/09/17 1627 Active               Point: Home exercise program (Active)    Learning Progress Summary    Learner Readiness Method Response Comment Documented by Status   Patient Acceptance E,TB NR  EE 10/10/17 1621 Active    Acceptance E,TB,D NR  DM 10/09/17 1627 Active               Point: Body mechanics (Active)    Learning Progress Summary    Learner Readiness Method Response Comment Documented by Status   Patient Acceptance E,TB NR  EE 10/10/17 1621 Active    Acceptance E,TB,D NR  DM 10/09/17 1627 Active               Point: Precautions (Active)    Learning Progress Summary    Learner Readiness Method Response Comment Documented by Status   Patient Acceptance E,TB NR  EE 10/10/17 1621 Active    Acceptance E,TB,D NR  DM 10/09/17 1627 Active   Family Eager E,TB,D VU hip precautions & need for KI DM 10/09/17 1627 Done                      User Key     Initials Effective Dates Name Provider Type Discipline    DM 10/06/15 -  Debra Church, PT Physical Therapist PT    EE 12/01/15 -  Meche Linares, PT Physical Therapist PT    EM 12/01/15 -  Beba Sanches, PT Physical Therapist PT                    PT Recommendation and Plan  Anticipated Discharge Disposition: skilled nursing facility  Planned Therapy Interventions: (S) balance training, bed mobility training, home exercise program, patient/family education, strengthening, transfer training, other (see comments) (gait not in MD orders, need to clarify)  PT Frequency: daily  Plan of Care Review  Plan Of Care Reviewed With: patient  Progress: progress towards functional goals is fair  Outcome Summary/Follow up Plan: patient required less assistance with transfers and ambulation today, requires mutliple verbal cues to complete  exercises and for safety awareness and proper technique with transfers.           Outcome Measures       10/11/17 1500 10/10/17 1600 10/09/17 0936    How much help from another person do you currently need...    Turning from your back to your side while in flat bed without using bedrails? 3  -EM 3  -EE 3  -DM    Moving from lying on back to sitting on the side of a flat bed without bedrails? 3  -EM 3  -EE 3  -DM    Moving to and from a bed to a chair (including a wheelchair)? 3  -EM 3  -EE 3  -DM    Standing up from a chair using your arms (e.g., wheelchair, bedside chair)? 3  -EM 3  -EE 3  -DM    Climbing 3-5 steps with a railing? 1  -EM 1  -EE 1  -DM    To walk in hospital room? 3  -EM 3  -EE 2  -DM    AM-PAC 6 Clicks Score 16  -EM 16  -EE 15  -DM    Functional Assessment    Outcome Measure Options  AM-PAC 6 Clicks Basic Mobility (PT)  -EE AM-PAC 6 Clicks Basic Mobility (PT)  -DM      User Key  (r) = Recorded By, (t) = Taken By, (c) = Cosigned By    Initials Name Provider Type    DM Debra Church, PT Physical Therapist    EE Meche Linares, PT Physical Therapist    EM Beba Sanches, PT Physical Therapist           Time Calculation:         PT Charges       10/11/17 1529          Time Calculation    Start Time 1505  -EM      Stop Time 1519  -EM      Time Calculation (min) 14 min  -EM      PT Received On 10/11/17  -EM      PT - Next Appointment 10/12/17  -EM        User Key  (r) = Recorded By, (t) = Taken By, (c) = Cosigned By    Initials Name Provider Type    EM Beba Sanches, PT Physical Therapist          Therapy Charges for Today     Code Description Service Date Service Provider Modifiers Qty    [id number] HC PT THER PROC EA 15 MIN 10/11/2017 Beba Sanches, PT GP 1          PT G-Codes  Outcome Measure Options: AM-PAC 6 Clicks Basic Mobility (PT)    Beba Sanches PT  10/11/2017

## 2017-10-11 NOTE — PLAN OF CARE
Problem: Patient Care Overview (Adult)  Goal: Plan of Care Review  Outcome: Ongoing (interventions implemented as appropriate)    10/11/17 5226   Coping/Psychosocial Response Interventions   Plan Of Care Reviewed With patient   Patient Care Overview   Progress progress towards functional goals is fair   Outcome Evaluation   Outcome Summary/Follow up Plan Remains disoriented to time at intervals. Ambulating to BR with assist X 1 and use of walker. Required sliding scale insulin based on results of Blood Sugars - Results this shift were 262 and 313. Pain being relieved with PO pain med. Lab work ordered for am. Discharge to [facility] scheduled for tomorrow.         Problem: Fractured Hip (Adult)  Goal: Signs and Symptoms of Listed Potential Problems Will be Absent or Manageable (Fractured Hip)  Outcome: Ongoing (interventions implemented as appropriate)    Problem: Fall Risk (Adult)  Goal: Absence of Falls  Outcome: Ongoing (interventions implemented as appropriate)    Problem: Diabetes, Type 2 (Adult)  Goal: Signs and Symptoms of Listed Potential Problems Will be Absent or Manageable (Diabetes, Type 2)  Outcome: Ongoing (interventions implemented as appropriate)

## 2017-10-11 NOTE — PROGRESS NOTES
Name: Jada Keith ADMIT: 10/7/2017   : 4/10/1926  PCP: Alicia Leger MD    MRN: [id number] LOS: 4 days   AGE/SEX: 89 y.o. female  ROOM: [facility]   Subjective   Subjective  CC/Reason for f/u: right hip fracture, DM  No acute events.  Pain is well-controlled.  Taking PO well.  Denies n/v/d. No cough, dyspnea, or dysuria.  Working with PT.  Objective   Vital Signs  Temp:  [97 °F (36.1 °C)-98.8 °F (37.1 °C)] 97 °F (36.1 °C)  Heart Rate:  [70-95] 95  Resp:  [16] 16  BP: (110-125)/(53-64) 125/60  SpO2:  [94 %-97 %] 95 %  on   ;   O2 Device: room air  Body mass index is 24.03 kg/(m^2).    Physical Exam  General: Alert. No distress.   Head: NCAT  Mouth/Throat: Oropharynx is clear and moist.   Eyes: EOMI, conjunctivae normal  Neck: supple, no JVD  Cardiovascular: Normal rate, regular rhythm and intact distal pulses.    Pulmonary/Chest: Effort normal. Clear to ascultation  Abdominal: Soft. Bowel sounds are normal. There is no tenderness.   Musculoskeletal: No dependent edema. No tenderness. Right hip in surgical dressing CDI  Neurological: Oriented x3, no gross deficits  Skin: Skin is warm and dry. No rash noted.   Psychiatric: Normal mood and affect  Results Review:       I reviewed the patient's new clinical results.    Results from last 7 days  Lab Units 10/11/17  0510 10/10/17  0344 10/09/17  0420 10/08/17  0655 10/07/17  1338   WBC 10*3/mm3 15.38* 19.99* 17.91* 14.69* 13.86*   HEMOGLOBIN g/dL 8.4* 8.9* 10.3* 12.2 13.2   PLATELETS 10*3/mm3 179 169 179 203 196       Results from last 7 days  Lab Units 10/11/17  0510 10/10/17  0344 10/09/17  0420 10/08/17  0655 10/07/17  1259   SODIUM mmol/L 130* 130* 132* 133* 133*   POTASSIUM mmol/L 4.7 4.8 4.6  4.6 4.6 4.5   CHLORIDE mmol/L 96* 96* 98 96* 94*   CO2 mmol/L 24.9 24.2 21.4* 27.0 25.3   BUN mg/dL 18 18 16 21 29*   CREATININE mg/dL 0.90 1.13* 1.08* 1.03* 1.39*   GLUCOSE mg/dL 262* 250* 348* 285* 280*   Estimated Creatinine Clearance: 40.8 mL/min (by C-G formula  based on Cr of 0.9).    Results from last 7 days  Lab Units 10/11/17  0510 10/10/17  0344 10/09/17  0420 10/08/17  0655 10/07/17  1259   CALCIUM mg/dL 10.3* 9.6 8.2 9.0 9.1   ALBUMIN g/dL  --   --   --   --  3.70   MAGNESIUM mg/dL  --   --  1.8  --   --          aspirin 325 mg Oral Daily   calcium carbonate 500 mg Oral BID   digoxin 125 mcg Oral Daily   donepezil 10 mg Oral Nightly   glipiZIDE 10 mg Oral BID AC   insulin aspart 0-9 Units Subcutaneous 4x Daily With Meals & Nightly   insulin detemir 20 Units Subcutaneous Nightly   ivabradine HCl 7.5 mg Oral Daily With Breakfast & Dinner   metFORMIN  mg Oral Daily With Breakfast   metoprolol succinate XL 25 mg Oral Daily   polyethylene glycol 17 g Oral Daily   sennosides-docusate sodium 2 tablet Oral Nightly      Diet Regular; Thin; Cardiac, Consistent Carbohydrate      Assessment/Plan   Assessment:     Active Hospital Problems (** Indicates Principal Problem)    Diagnosis Date Noted   • **Closed subcapital fracture of right femur [S72.011A] 10/07/2017   • Hyponatremia [E87.1] 10/09/2017   • DM type 2 (diabetes mellitus, type 2), new diagnosis [E11.9] 10/08/2017   • Acute kidney injury [N17.9] 10/07/2017   • Leukocytosis [D72.829] 10/07/2017   • Chronic combined systolic and diastolic heart failure [I50.42] 04/30/2017   • 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       Plan:   Right Femur Fracture  -s/p right bipolar hemiarthroplasty 10/8/17  -pain control, mobility efforts  -IS    Leukocytosis  -improving, likely from physiologic stress  -no e/o infection (no UTI, no PNA, no GI issues)  -continue to monitor off  abx    Type 2 DM  -A1C 10/95%  -started on levemir insulin with ssi coverage  -d/w patient and daughter, they are concerned about insulin therapy at home after rehab  -start on glipizide and metformin today (renal function is okay)  -continue with current levemir dose and ssi, will likely need at least daily levemir for now    Hyponatremia  -Na stable over past couple days, continue to watch    DVT Prophylaxis  - per ortho; resume previous ASA 81mg when complete    Disposition  Plan discharge to SNF tomorrow. ([facility])      Juanjo Pike MD  [facility]  10/11/17  11:11 AM

## 2017-10-11 NOTE — PROGRESS NOTES
Orthopedic Progress Note    Subjective     Post-Operative Day: 3 post-right hip bipolar  Systemic or Specific Complaints: No Complaints    Objective     Vital signs in last 24 hours:  Temp:  [97.6 °F (36.4 °C)-98.8 °F (37.1 °C)] 97.6 °F (36.4 °C)  Heart Rate:  [70-79] 70  Resp:  [16] 16  BP: (110-125)/(53-64) 118/60    General: alert, appears stated age and cooperative   Neurovascular: Calf muscle soft And nontender     Wound: Wound clean and dry no evidence of infection., No Erythema, No Edema, No Drainage and Wound Intact   Range of Motion:      Data Review  CBC:  Results from last 7 days  Lab Units 10/11/17  0510   WBC 10*3/mm3 15.38*   RBC 10*6/mm3 2.82*   HEMOGLOBIN g/dL 8.4*   HEMATOCRIT % 26.0*   PLATELETS 10*3/mm3 179       Assessment/Plan     Status post-right bipolar hip: Doing well postoperatively.     Pain Relief: some relief    Discharge today to [facility], Return to Clinic: 4 weeks    Activity: out of bed and ambulate with walker, progress weight bear as tolerated     LOS: 4 days     Michael England MD    Date: 10/11/2017  Time: 9:46 AM

## 2017-10-11 NOTE — PLAN OF CARE
Problem: Patient Care Overview (Adult)  Goal: Plan of Care Review    10/11/17 8531   Outcome Evaluation   Outcome Summary/Follow up Plan Discussed the importance of checking blood sugars and taking medications as ordered due to elevated blood sugars

## 2017-10-11 NOTE — PLAN OF CARE
Problem: Patient Care Overview (Adult)  Goal: Plan of Care Review  Outcome: Ongoing (interventions implemented as appropriate)    10/11/17 0329   Coping/Psychosocial Response Interventions   Plan Of Care Reviewed With patient   Patient Care Overview   Progress improving   Outcome Evaluation   Outcome Summary/Follow up Plan Patient continues to ambulate to [facility] with assist x 1. Pain well controlled with minimal PO pain medication. Continue to educate on diabetes. Encourgaed healthy food choices and the importance of insulin to bring blood glucose levels down.        Goal: Adult Individualization and Mutuality  Outcome: Ongoing (interventions implemented as appropriate)  Goal: Discharge Needs Assessment  Outcome: Ongoing (interventions implemented as appropriate)    Problem: Fractured Hip (Adult)  Goal: Signs and Symptoms of Listed Potential Problems Will be Absent or Manageable (Fractured Hip)  Outcome: Ongoing (interventions implemented as appropriate)    Problem: Fall Risk (Adult)  Goal: Absence of Falls  Outcome: Ongoing (interventions implemented as appropriate)    Problem: Diabetes, Type 2 (Adult)  Goal: Signs and Symptoms of Listed Potential Problems Will be Absent or Manageable (Diabetes, Type 2)  Outcome: Ongoing (interventions implemented as appropriate)

## 2017-10-12 VITALS
BODY MASS INDEX: 23.9 KG/M2 | DIASTOLIC BLOOD PRESSURE: 56 MMHG | OXYGEN SATURATION: 97 % | SYSTOLIC BLOOD PRESSURE: 124 MMHG | TEMPERATURE: 98.6 F | WEIGHT: 140 LBS | HEIGHT: 64 IN | HEART RATE: 78 BPM | RESPIRATION RATE: 16 BRPM

## 2017-10-12 PROBLEM — Z66 DNR (DO NOT RESUSCITATE): Status: ACTIVE | Noted: 2017-10-12

## 2017-10-12 LAB
ANION GAP SERPL CALCULATED.3IONS-SCNC: 10.1 MMOL/L
BASOPHILS # BLD AUTO: 0.04 10*3/MM3 (ref 0–0.2)
BASOPHILS NFR BLD AUTO: 0.3 % (ref 0–1.5)
BUN BLD-MCNC: 15 MG/DL (ref 8–23)
BUN/CREAT SERPL: 17.9 (ref 7–25)
CALCIUM SPEC-SCNC: 10.5 MG/DL (ref 8.2–9.6)
CHLORIDE SERPL-SCNC: 97 MMOL/L (ref 98–107)
CO2 SERPL-SCNC: 27.9 MMOL/L (ref 22–29)
CREAT BLD-MCNC: 0.84 MG/DL (ref 0.57–1)
DEPRECATED RDW RBC AUTO: 48.3 FL (ref 37–54)
EOSINOPHIL # BLD AUTO: 0.49 10*3/MM3 (ref 0–0.7)
EOSINOPHIL NFR BLD AUTO: 3.8 % (ref 0.3–6.2)
ERYTHROCYTE [DISTWIDTH] IN BLOOD BY AUTOMATED COUNT: 14.3 % (ref 11.7–13)
GFR SERPL CREATININE-BSD FRML MDRD: 64 ML/MIN/1.73
GLUCOSE BLD-MCNC: 103 MG/DL (ref 65–99)
GLUCOSE BLDC GLUCOMTR-MCNC: 110 MG/DL (ref 70–130)
GLUCOSE BLDC GLUCOMTR-MCNC: 203 MG/DL (ref 70–130)
HCT VFR BLD AUTO: 27.3 % (ref 35.6–45.5)
HGB BLD-MCNC: 8.4 G/DL (ref 11.9–15.5)
IMM GRANULOCYTES # BLD: 0.26 10*3/MM3 (ref 0–0.03)
IMM GRANULOCYTES NFR BLD: 2 % (ref 0–0.5)
LYMPHOCYTES # BLD AUTO: 2.44 10*3/MM3 (ref 0.9–4.8)
LYMPHOCYTES NFR BLD AUTO: 18.7 % (ref 19.6–45.3)
MCH RBC QN AUTO: 28.4 PG (ref 26.9–32)
MCHC RBC AUTO-ENTMCNC: 30.8 G/DL (ref 32.4–36.3)
MCV RBC AUTO: 92.2 FL (ref 80.5–98.2)
MONOCYTES # BLD AUTO: 1.24 10*3/MM3 (ref 0.2–1.2)
MONOCYTES NFR BLD AUTO: 9.5 % (ref 5–12)
NEUTROPHILS # BLD AUTO: 8.57 10*3/MM3 (ref 1.9–8.1)
NEUTROPHILS NFR BLD AUTO: 65.7 % (ref 42.7–76)
NRBC BLD MANUAL-RTO: 0 /100 WBC (ref 0–0)
PLATELET # BLD AUTO: 243 10*3/MM3 (ref 140–500)
PMV BLD AUTO: 10 FL (ref 6–12)
POTASSIUM BLD-SCNC: 4.5 MMOL/L (ref 3.5–5.2)
RBC # BLD AUTO: 2.96 10*6/MM3 (ref 3.9–5.2)
SODIUM BLD-SCNC: 135 MMOL/L (ref 136–145)
WBC NRBC COR # BLD: 13.04 10*3/MM3 (ref 4.5–10.7)

## 2017-10-12 PROCEDURE — 82962 GLUCOSE BLOOD TEST: CPT

## 2017-10-12 PROCEDURE — 80048 BASIC METABOLIC PNL TOTAL CA: CPT | Performed by: INTERNAL MEDICINE

## 2017-10-12 PROCEDURE — 63710000001 INSULIN ASPART PER 5 UNITS: Performed by: HOSPITALIST

## 2017-10-12 PROCEDURE — 85025 COMPLETE CBC W/AUTO DIFF WBC: CPT | Performed by: INTERNAL MEDICINE

## 2017-10-12 RX ORDER — METFORMIN HYDROCHLORIDE 500 MG/1
1000 TABLET, EXTENDED RELEASE ORAL
Start: 2017-10-13 | End: 2017-12-02 | Stop reason: SDUPTHER

## 2017-10-12 RX ORDER — ASPIRIN 81 MG/1
81 TABLET ORAL DAILY
Status: ON HOLD
Start: 2017-10-27 | End: 2021-11-18

## 2017-10-12 RX ORDER — GLIPIZIDE 10 MG/1
10 TABLET ORAL
Start: 2017-10-12 | End: 2017-12-06 | Stop reason: HOSPADM

## 2017-10-12 RX ORDER — ASPIRIN 325 MG
325 TABLET ORAL DAILY
Qty: 14 TABLET | Refills: 0
Start: 2017-10-13 | End: 2017-10-27

## 2017-10-12 RX ADMIN — CALCIUM 500 MG: 500 TABLET ORAL at 08:20

## 2017-10-12 RX ADMIN — ASPIRIN 325 MG: 325 TABLET ORAL at 08:20

## 2017-10-12 RX ADMIN — METFORMIN HYDROCHLORIDE 500 MG: 500 TABLET, EXTENDED RELEASE ORAL at 08:20

## 2017-10-12 RX ADMIN — INSULIN ASPART 4 UNITS: 100 INJECTION, SOLUTION INTRAVENOUS; SUBCUTANEOUS at 12:25

## 2017-10-12 RX ADMIN — HYDROCODONE BITARTRATE AND ACETAMINOPHEN 1 TABLET: 5; 325 TABLET ORAL at 06:24

## 2017-10-12 RX ADMIN — POLYETHYLENE GLYCOL 3350 17 G: 17 POWDER, FOR SOLUTION ORAL at 08:21

## 2017-10-12 RX ADMIN — GLIPIZIDE 10 MG: 10 TABLET ORAL at 07:20

## 2017-10-12 RX ADMIN — METOPROLOL SUCCINATE 25 MG: 25 TABLET, FILM COATED, EXTENDED RELEASE ORAL at 08:20

## 2017-10-12 RX ADMIN — DIGOXIN 125 MCG: 0.12 TABLET ORAL at 12:26

## 2017-10-12 RX ADMIN — HYDROCODONE BITARTRATE AND ACETAMINOPHEN 1 TABLET: 5; 325 TABLET ORAL at 13:31

## 2017-10-12 NOTE — NURSING NOTE
10/12/17 1015   Pressure Ulcer 10/11/17 2044 Right heel other (see comments)   Date first assessed/Time first assessed: 10/11/17 2044   Present On Admission (Pressure Ulcer): no  Side: (c) Right  Location: heel  Stage: (c) other (see comments)  Additional Comments: (c)    Dressing Appearance other (see comments)  (open to air)   Pressure Ulcer Appearance (dried intact callous with blistered edge)   Periwound Area (dry and intact; no erythema, skin quite dry)   Dgt at bedside; dgt unaware of blister on heel; visualized blister.  Instructed dgt in how pressure injuries can occur and tx of such with need to float heels, observe daily; no topical tx indicated unless blister opens.  Dgtr states understanding.  Discussed with staff RN, states she has ordered offloading boots for pt.

## 2017-10-12 NOTE — SIGNIFICANT NOTE
10/12/17 1421   PT Discharge Summary   Reason for Discharge Discharge from facility   Discharge Destination SNF

## 2017-10-12 NOTE — DISCHARGE SUMMARY
Date of Admission: 10/7/2017  Date of Discharge:  10/12/2017  Primary Care Physician: Alicia Leger MD     Discharge Diagnosis:  Active Hospital Problems (** Indicates Principal Problem)    Diagnosis Date Noted   • **Closed subcapital fracture of right femur [S72.011A] 10/07/2017   • DNR (do not resuscitate) [Z66] 10/12/2017   • Hyponatremia [E87.1] 10/09/2017   • DM type 2 (diabetes mellitus, type 2), new diagnosis [E11.9] 10/08/2017   • Acute kidney injury [N17.9] 10/07/2017   • Leukocytosis [D72.829] 10/07/2017   • Chronic combined systolic and diastolic heart failure [I50.42] 04/30/2017   • 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       Presenting Problem/History of Present Illness:  Closed subcapital fracture of right femur, initial encounter [S72.011A]  Fall, initial encounter [W19.XXXA]     Hospital Course:  The patient is a 89 y.o. female who presented with right hip pain after a fall sustained at home.  Please see admission H&P from 10/7/17 for further details.  She was found to have a right proximal femur fracture.  Dr. Michael England with orthopedic surgery was consulted and he performed a right bipolar hemiarthroplasty on 10/8/17 which she tolerated quite well.  She does have a history of dilated cardiomyopathy with EF 22% from previous chemotherapy (NHL), and Dr. Jasmine with cardiology followed her prior to and after the surgery.  She remained stable from a cardiac standpoint.  He did recommend that consideration should be given to discontinuing her digoxin as an outpatient when all else is stable.    She was noted to have an elevated WBC count with elevated  "immature fraction and monocytes.  This was present prior to admission.  She had no evidence of infection, and her WBC did improve without antibiotics.  She can follow this up with her outpatient physicians.    She was additionally noted to have elevated blood glucose levels in the 300s on admission.  Her A1C was 10.95%.  She was started on levemir 20 units with ssi coverage perioperatively.  She was additionally started on glipizide and metformin prior to discharge.  She will be discharged on these oral medications and an insulin sliding scale.  She should hopefully be able to sufficiently control her BG with oral medications given that she will likely have significant issues self-injecting insulin if she eventually goes home.    Exam Today:  Blood pressure 124/56, pulse 78, temperature 98.6 °F (37 °C), temperature source Oral, resp. rate 16, height 64\" (162.6 cm), weight 140 lb (63.5 kg), SpO2 97 %.  General: Alert. No distress.   Head: NCAT  Mouth/Throat: Oropharynx is clear and moist.   Eyes: EOMI, conjunctivae normal  Neck: supple, no JVD  Cardiovascular: Normal rate, regular rhythm and intact distal pulses.    Pulmonary/Chest: Effort normal. Clear to ascultation  Abdominal: Soft. Bowel sounds are normal. There is no tenderness.   Musculoskeletal: No dependent edema. No tenderness. Right hip in surgical dressing CDI  Neurological: Oriented x3, no gross deficits  Skin: Skin is warm and dry. No rash noted.   Psychiatric: Normal mood and affect    Procedures Performed:  Procedure(s):  CEMENTED BIPOLAR       Consults:   Consults     Date and Time Order Name Status Description    10/7/2017 1556 Inpatient Consult to Orthopedic Surgery Completed     10/7/2017 1556 Inpatient Consult to Cardiology Completed     10/7/2017 1336 Ortho (on-call MD unless specified) Completed     10/7/2017 1336 [facility] (on-call MD unless specified) Completed            Discharge Disposition:  Rehab Facility or Unit (DC - External)    Discharge " Medications:   Jada Keith   Home Medication Instructions MELE:[id number]    Printed on:10/12/17 1188   Medication Information                      aspirin 325 MG tablet  Take 1 tablet by mouth Daily for 14 days.             aspirin 81 MG EC tablet  Take 1 tablet by mouth Daily.             bisacodyl (DULCOLAX) 10 MG suppository  Insert 1 suppository into the rectum Daily As Needed for Constipation.             calcium carbonate (TUMS) 500 MG chewable tablet  Chew 1,000 mg 2 (Two) Times a Day As Needed for Heartburn.             calcium citrate-vitamin d (CITRACAL) 200-250 MG-UNIT tablet tablet  Take  by mouth Daily.             digoxin (LANOXIN) 125 MCG tablet  Take 125 mcg by mouth Daily.             donepezil (ARICEPT) 10 MG tablet  Take 10 mg by mouth Every Night.             enalapril (VASOTEC) 20 MG tablet  Take 20 mg by mouth Daily.             furosemide (LASIX) 40 MG tablet  Take 40 mg by mouth 2 (Two) Times a Day.             glipiZIDE (GLUCOTROL) 10 MG tablet  Take 1 tablet by mouth 2 (Two) Times a Day Before Meals.             HYDROcodone-acetaminophen (NORCO) 5-325 MG per tablet  Take 2 tablets by mouth Every 6 (Six) Hours As Needed for Moderate Pain  for up to 7 days.             insulin aspart (novoLOG) 100 UNIT/ML injection  Inject 0-9 Units under the skin 4 (Four) Times a Day With Meals & at Bedtime.             ivabradine HCl (CORLANOR) 5 MG tablet tablet  Take 7.5 mg by mouth Daily With Breakfast & Dinner.             metFORMIN ER (GLUCOPHAGE-XR) 500 MG 24 hr tablet  Take 2 tablets by mouth Daily With Breakfast.             metoprolol succinate XL (TOPROL-XL) 50 MG 24 hr tablet  Take 50 mg by mouth Daily.             muscle rub (Merrill-Loo) 10-15 % cream cream  Apply  topically Every 1 (One) Hour As Needed (leg pain/cramps).             ondansetron (ZOFRAN) 4 MG tablet  Take 1 tablet by mouth Every 6 (Six) Hours As Needed for Nausea or Vomiting.             spironolactone (ALDACTONE) 25 MG  tablet  Take 25 mg by mouth Daily.                 Discharge Diet:   Diet Instructions     Diet: Consistent Carbohydrate; Thin       Discharge Diet:  Consistent Carbohydrate   Fluid Consistency:  Thin                 Activity at Discharge:   Activity Instructions     Discharge Activity       Discharge and Follow up Instructions:     Total Hip Replacement Discharge Instructions:    I. ACTIVITIES:  1. Exercises:  Complete exercise program as taught by the hospital physical therapist 2 times per day  Exercise program will be advanced by the physical therapist  During the day be up ambulating every 2 hours (while awake) for short distances  Complete the ankle pump exercises at least 10 times per hour (while awake)  Elevate legs when in bed and for at least 30 minutes during the day.Use cold packs 20-30 minutes approximately 5 times per day. This should be done before and after completing your exercises and at any time you are experiencing pain/ stiffness in your operative extremity.      2. Activities of Daily Living:  No tub baths, hot tubs, or swimming pools for 4 weeks  May shower and let water run over the incision on post-operative day #5 if no drainage. Do not scrub or rub the incision. Simply let the water run over the incision and pat dry.    II. Restrictions  Continue hip precautions as taught at the hospital  Your surgeon will discuss with you when you will be able to drive again. Usual guidelines are you are to be off pain medications prior to driving.  Weight bearing is as tolerated  First week stay inside on even terrain. May go up and down stairs one stair at a time utilizing the hand rail once cleared by physical therapy to do so.  After one week, you may venture outside (if cleared to do so by physical therapist).    III. Precautions:  Everyone that comes near you should wash their hands  No elective dental, genital-urinary, or colon procedures or surgical procedures for 12 weeks after surgery unless  absolutely necessary.   If dental work or surgical procedure is deemed absolutely necessary, you will need to contact your surgeon as you will need to take antibiotics 1 hour prior to any dental work (including teeth cleanings).  Please discuss with your surgeon prophylactic antibiotics as the length of time this intervention will be necessary for you varies with each patient's health history and situation.  Avoid sick people. If you must be around someone who is ill, they should wear a mask.  Avoid visits to the Emergency Room or Urgent Care. If you feel you need to go to the Emergency Room, please notify your Surgeon's office.  Stockings are to be worn for one week after surgery and are to be placed on in the morning and removed at night. Observe your skin when stocking is removed for any problems. Monitor the stockings to ensure that any swelling is not causing the stockings to become too tight. In this case, remove stockings immediately.      IV. INCISION CARE:  Wash your hands prior to dressing changes  Change the dressing as needed to keep incision clean and dry. Utilize dry gauze and paper tape. Avoid touching the side of the gauze that goes against the incision with your hands.  No creams or ointments to the incision  May remove dressing once the incision is free of drainage  Do not touch or pick at the incision  Check incision every day and notify surgeon immediately if any of the following signs or symptoms are noted:  Increase in redness  Increase in swelling around the incision and of the entire extremity  Increase in pain  Drainage oozing from the incision  Pulling apart of the edges of the incision  Increase in overall body temperature (greater than 100.5 degrees)  Your Staples will be removed between 10-14 days postoperation.  This may be done by either the home health nurse, rehabilitation nurse or during your return visit to Dr. Plata's office.  You will then be instructed on how to care for the  incision.  (Please call the office if your staples have not been removed within 14 days after surgery).    V. Medications:   1. Anticoagulants: You will be discharged on an anticoagulant. This is a prophylactic medication that helps prevent blood clots during your post-operative period.  You will be on  Aspirin 325 mg Enteric coated every 12 hours orally  for  21 days.   While taking the anticoagulant, you should avoid taking any additional aspirin, ibuprofen (Advil or Motrin), Aleve (Naprosyn) or other non-steroidal anti-inflammatory medications.   Notify surgeon immediately if any elvis bleeding is noted in the urine, stool, emesis, or from the nose or the incision. Blood in the stool will often appear as black rather than red. Blood in urine may appear as pink. Blood in emesis may appear as brown/black like coffee grounds.  You will need to apply pressure for longer periods of time to any cuts or abrasions to stop bleeding  Avoid alcohol while taking anticoagulants    2. Stool Softeners: You will be at greater risk of constipation after surgery due to being less mobile and the pain medications.   Take stool softeners as instructed by your surgeon while on pain medications. Over the counter Colace 100 mg 1-2 capsules twice daily.   If stools become too loose or too frequent, please decreases the dosage or stop the stool softener.  If constipation occurs despite use of stool softeners, you are to continue the stool softeners and add a laxative (Milk of Magnesia 1 ounce daily as needed).  Dulcolax oral tabs or suppository, or a fleets enema can also be utilized for constipation and can be obtained over the counter.   If above interventions are unsuccessful in inducing bowel movements, please contact your surgeon's office / family physician's office.  Drink plenty of fluids, and eat fruits and vegetables during your recovery time    3. Pain Medications utilized after surgery are narcotics and the law requires that  the following information be given to all patients that are prescribed narcotics:  CLASSIFICATION: Pain medications are called Opioids and are narcotics  LEGALITIES: It is illegal to share narcotics with others and to drive within 24 hours of taking narcotics  POTENTIAL SIDE EFFECTS: Potential side effects of opioids include: nausea, vomiting, itching, dizziness, drowsiness, dry mouth, constipation, and difficulty urinating.  POTENTIAL ADVERSE EFFECTS:   Opioid tolerance can develop with use of pain medications and this simply means that it requires more and more of the medication to control pain; however, this is seen more in patients that use opioids for longer periods of time.  Opioid dependence can develop with use of Opioids and this simply means that to stop the medication can cause withdrawal symptoms; however, this is seen with patients that use Opioids for longer periods of time.  Opioid addiction can develop with use of Opioids and the incidence of this is very unlikely in patients who take the medications as ordered and stop the medications as instructed.  Opioid overdose can be dangerous, but is unlikely when the medication is taken as ordered and stopped when ordered. It is important not to mix opioids with alcohol or with and type of sedative such as Benadryl as this can lead to over sedation and respiratory difficulty.  DOSAGE:   Pain medications will need to be taken consistently for the first week to decrease pain and promote adequate pain relief and participation in physical therapy.  After the initial surgical pain begins to resolve, you may begin to decrease the pain medication. By the end of 6 weeks, you should be off of pain medications.  Refills will not be given by the office during evening hours, on weekends, or after 6 weeks post-op.  To seek refills on pain medications during the initial 6 week post-operative period, you must call the office 48 hours in advance to request the refill. The  office will then notify you when to  the prescription. DO NOT wait until you are out of the medication to request a refill.    V. FOLLOW-UP VISITS:  You will need to follow up in the office with your surgeon in  4 weeks . Please call this number [phone]  to schedule this appointment.  If you have any concerns or suspected complications prior to your follow up visit, please call your surgeons office. Do not wait until your appointment time if you suspect complications. These will need to be addressed in the office promptly.                 Follow-up Appointments:  No future appointments.  Additional Instructions for the Follow-ups that You Need to Schedule     Discharge Follow-Up With Specified Provider    As directed    To:  Dr. Michael England (Orthopedic Surgery)   Follow Up:  1 Month       Return to Clinic for Follow Up (Specify Provider)    As directed    To:  [facility] - Dr. England   Follow Up:  1 Month   Follow Up Details:  [phone]                 Test Results Pending at Discharge:       Juanjo Pike MD  10/12/17  1:09 PM    Time Spent on Discharge Activities: Greater than 30 minutes.

## 2017-10-12 NOTE — PLAN OF CARE
Problem: Patient Care Overview (Adult)  Goal: Plan of Care Review  Outcome: Ongoing (interventions implemented as appropriate)    10/12/17 0142   Coping/Psychosocial Response Interventions   Plan Of Care Reviewed With patient   Patient Care Overview   Progress improving   Outcome Evaluation   Outcome Summary/Follow up Plan vss, dsg changed and wound is c/d/i, n/v status wnl, voiding well, reports adequate pain control, on sliding scale insullin, discussed monitoring b/p prior to taking b/p meds, possible D/C to [facility] today       Goal: Adult Individualization and Mutuality  Outcome: Ongoing (interventions implemented as appropriate)  Goal: Discharge Needs Assessment  Outcome: Ongoing (interventions implemented as appropriate)    Problem: Fractured Hip (Adult)  Goal: Signs and Symptoms of Listed Potential Problems Will be Absent or Manageable (Fractured Hip)  Outcome: Ongoing (interventions implemented as appropriate)    Problem: Fall Risk (Adult)  Goal: Absence of Falls  Outcome: Ongoing (interventions implemented as appropriate)

## 2017-10-16 ENCOUNTER — EPISODE CHANGES (OUTPATIENT)
Dept: CASE MANAGEMENT | Facility: OTHER | Age: 82
End: 2017-10-16

## 2017-11-30 ENCOUNTER — EPISODE CHANGES (OUTPATIENT)
Dept: CASE MANAGEMENT | Facility: OTHER | Age: 82
End: 2017-11-30

## 2017-12-02 ENCOUNTER — HOSPITAL ENCOUNTER (INPATIENT)
Facility: HOSPITAL | Age: 82
LOS: 4 days | Discharge: NURSING, REHAB OR LONG-TERM CARE | End: 2017-12-06
Attending: EMERGENCY MEDICINE | Admitting: INTERNAL MEDICINE

## 2017-12-02 ENCOUNTER — APPOINTMENT (OUTPATIENT)
Dept: GENERAL RADIOLOGY | Facility: HOSPITAL | Age: 82
End: 2017-12-02

## 2017-12-02 DIAGNOSIS — N17.9 ACUTE NONTRAUMATIC KIDNEY INJURY (HCC): ICD-10-CM

## 2017-12-02 DIAGNOSIS — S91.302A NON HEALING LEFT HEEL WOUND: Primary | ICD-10-CM

## 2017-12-02 DIAGNOSIS — R26.89 DECREASED MOBILITY: ICD-10-CM

## 2017-12-02 PROBLEM — E11.9 DM (DIABETES MELLITUS) (HCC): Status: ACTIVE | Noted: 2017-12-02

## 2017-12-02 LAB
ANION GAP SERPL CALCULATED.3IONS-SCNC: 17.1 MMOL/L
BASOPHILS # BLD AUTO: 0.03 10*3/MM3 (ref 0–0.2)
BASOPHILS NFR BLD AUTO: 0.3 % (ref 0–1.5)
BUN BLD-MCNC: 71 MG/DL (ref 8–23)
BUN/CREAT SERPL: 31.6 (ref 7–25)
CALCIUM SPEC-SCNC: 9.4 MG/DL (ref 8.2–9.6)
CHLORIDE SERPL-SCNC: 98 MMOL/L (ref 98–107)
CO2 SERPL-SCNC: 23.9 MMOL/L (ref 22–29)
CREAT BLD-MCNC: 2.25 MG/DL (ref 0.57–1)
DEPRECATED RDW RBC AUTO: 47.6 FL (ref 37–54)
EOSINOPHIL # BLD AUTO: 0.38 10*3/MM3 (ref 0–0.7)
EOSINOPHIL NFR BLD AUTO: 3.7 % (ref 0.3–6.2)
ERYTHROCYTE [DISTWIDTH] IN BLOOD BY AUTOMATED COUNT: 14.2 % (ref 11.7–13)
GFR SERPL CREATININE-BSD FRML MDRD: 20 ML/MIN/1.73
GLUCOSE BLD-MCNC: 129 MG/DL (ref 65–99)
HCT VFR BLD AUTO: 34.3 % (ref 35.6–45.5)
HGB BLD-MCNC: 10.6 G/DL (ref 11.9–15.5)
IMM GRANULOCYTES # BLD: 0.03 10*3/MM3 (ref 0–0.03)
IMM GRANULOCYTES NFR BLD: 0.3 % (ref 0–0.5)
LYMPHOCYTES # BLD AUTO: 2.45 10*3/MM3 (ref 0.9–4.8)
LYMPHOCYTES NFR BLD AUTO: 23.7 % (ref 19.6–45.3)
MCH RBC QN AUTO: 28.3 PG (ref 26.9–32)
MCHC RBC AUTO-ENTMCNC: 30.9 G/DL (ref 32.4–36.3)
MCV RBC AUTO: 91.5 FL (ref 80.5–98.2)
MONOCYTES # BLD AUTO: 0.78 10*3/MM3 (ref 0.2–1.2)
MONOCYTES NFR BLD AUTO: 7.5 % (ref 5–12)
NEUTROPHILS # BLD AUTO: 6.68 10*3/MM3 (ref 1.9–8.1)
NEUTROPHILS NFR BLD AUTO: 64.5 % (ref 42.7–76)
PLATELET # BLD AUTO: 256 10*3/MM3 (ref 140–500)
PMV BLD AUTO: 9.4 FL (ref 6–12)
POTASSIUM BLD-SCNC: 4.9 MMOL/L (ref 3.5–5.2)
RBC # BLD AUTO: 3.75 10*6/MM3 (ref 3.9–5.2)
SODIUM BLD-SCNC: 139 MMOL/L (ref 136–145)
WBC NRBC COR # BLD: 10.35 10*3/MM3 (ref 4.5–10.7)

## 2017-12-02 PROCEDURE — 85025 COMPLETE CBC W/AUTO DIFF WBC: CPT | Performed by: NURSE PRACTITIONER

## 2017-12-02 PROCEDURE — 73630 X-RAY EXAM OF FOOT: CPT

## 2017-12-02 PROCEDURE — 80048 BASIC METABOLIC PNL TOTAL CA: CPT | Performed by: NURSE PRACTITIONER

## 2017-12-02 PROCEDURE — 99284 EMERGENCY DEPT VISIT MOD MDM: CPT

## 2017-12-02 RX ORDER — ASPIRIN 81 MG/1
81 TABLET ORAL DAILY
Status: DISCONTINUED | OUTPATIENT
Start: 2017-12-03 | End: 2017-12-06 | Stop reason: HOSPADM

## 2017-12-02 RX ORDER — SODIUM CHLORIDE 9 MG/ML
60 INJECTION, SOLUTION INTRAVENOUS CONTINUOUS
Status: DISCONTINUED | OUTPATIENT
Start: 2017-12-02 | End: 2017-12-04

## 2017-12-02 RX ORDER — GLIPIZIDE 10 MG/1
10 TABLET ORAL
Status: DISCONTINUED | OUTPATIENT
Start: 2017-12-03 | End: 2017-12-02

## 2017-12-02 RX ORDER — CALCIUM CARBONATE 500(1250)
500 TABLET ORAL 2 TIMES DAILY
Status: DISCONTINUED | OUTPATIENT
Start: 2017-12-03 | End: 2017-12-02

## 2017-12-02 RX ORDER — DEXTROSE MONOHYDRATE 25 G/50ML
25 INJECTION, SOLUTION INTRAVENOUS
Status: DISCONTINUED | OUTPATIENT
Start: 2017-12-02 | End: 2017-12-06 | Stop reason: HOSPADM

## 2017-12-02 RX ORDER — METOPROLOL SUCCINATE 50 MG/1
50 TABLET, EXTENDED RELEASE ORAL DAILY
Status: DISCONTINUED | OUTPATIENT
Start: 2017-12-03 | End: 2017-12-06 | Stop reason: HOSPADM

## 2017-12-02 RX ORDER — OMEGA-3S/DHA/EPA/FISH OIL/D3 300MG-1000
400 CAPSULE ORAL 2 TIMES DAILY
Status: DISCONTINUED | OUTPATIENT
Start: 2017-12-03 | End: 2017-12-02

## 2017-12-02 RX ORDER — SODIUM CHLORIDE 0.9 % (FLUSH) 0.9 %
1-10 SYRINGE (ML) INJECTION AS NEEDED
Status: DISCONTINUED | OUTPATIENT
Start: 2017-12-02 | End: 2017-12-06 | Stop reason: HOSPADM

## 2017-12-02 RX ORDER — DONEPEZIL HYDROCHLORIDE 10 MG/1
10 TABLET, FILM COATED ORAL NIGHTLY
Status: DISCONTINUED | OUTPATIENT
Start: 2017-12-02 | End: 2017-12-06 | Stop reason: HOSPADM

## 2017-12-02 RX ORDER — METOPROLOL SUCCINATE 100 MG/1
100 TABLET, EXTENDED RELEASE ORAL DAILY
Status: DISCONTINUED | OUTPATIENT
Start: 2017-12-03 | End: 2017-12-02

## 2017-12-02 RX ORDER — HEPARIN SODIUM 5000 [USP'U]/ML
5000 INJECTION, SOLUTION INTRAVENOUS; SUBCUTANEOUS EVERY 12 HOURS SCHEDULED
Status: DISCONTINUED | OUTPATIENT
Start: 2017-12-02 | End: 2017-12-06 | Stop reason: HOSPADM

## 2017-12-02 RX ORDER — FERROUS SULFATE 325(65) MG
325 TABLET ORAL
COMMUNITY
End: 2017-12-02

## 2017-12-02 RX ORDER — METOPROLOL SUCCINATE 100 MG/1
50 TABLET, EXTENDED RELEASE ORAL DAILY
Status: ON HOLD | COMMUNITY
End: 2017-12-06

## 2017-12-02 RX ORDER — NICOTINE POLACRILEX 4 MG
15 LOZENGE BUCCAL
Status: DISCONTINUED | OUTPATIENT
Start: 2017-12-02 | End: 2017-12-06 | Stop reason: HOSPADM

## 2017-12-02 RX ORDER — GLIPIZIDE 10 MG/1
10 TABLET ORAL NIGHTLY
Status: DISCONTINUED | OUTPATIENT
Start: 2017-12-03 | End: 2017-12-04

## 2017-12-02 RX ADMIN — SODIUM CHLORIDE 500 ML: 9 INJECTION, SOLUTION INTRAVENOUS at 20:45

## 2017-12-02 NOTE — ED TRIAGE NOTES
Pt reports right heel pain, has wound on rt heel. Pt has been non weight bearing on rt leg due to partial rt hip replacement few months ago. Pt reports mild pain with rest states does not want to walk on it related to pain.

## 2017-12-03 PROBLEM — L97.419 HEEL ULCER, RIGHT, WITH UNSPECIFIED SEVERITY (HCC): Status: ACTIVE | Noted: 2017-12-02

## 2017-12-03 LAB
ALBUMIN SERPL-MCNC: 3.5 G/DL (ref 3.5–5.2)
ALBUMIN/GLOB SERPL: 1 G/DL
ALP SERPL-CCNC: 58 U/L (ref 39–117)
ALT SERPL W P-5'-P-CCNC: 9 U/L (ref 1–33)
ANION GAP SERPL CALCULATED.3IONS-SCNC: 12.5 MMOL/L
AST SERPL-CCNC: 12 U/L (ref 1–32)
BASOPHILS # BLD AUTO: 0.02 10*3/MM3 (ref 0–0.2)
BASOPHILS NFR BLD AUTO: 0.2 % (ref 0–1.5)
BILIRUB SERPL-MCNC: 0.3 MG/DL (ref 0.1–1.2)
BUN BLD-MCNC: 56 MG/DL (ref 8–23)
BUN/CREAT SERPL: 36.4 (ref 7–25)
CALCIUM SPEC-SCNC: 9.1 MG/DL (ref 8.2–9.6)
CHLORIDE SERPL-SCNC: 102 MMOL/L (ref 98–107)
CO2 SERPL-SCNC: 24.5 MMOL/L (ref 22–29)
CREAT BLD-MCNC: 1.54 MG/DL (ref 0.57–1)
DEPRECATED RDW RBC AUTO: 47.4 FL (ref 37–54)
DIGOXIN SERPL-MCNC: 0.7 NG/ML (ref 0.6–1.2)
EOSINOPHIL # BLD AUTO: 0.39 10*3/MM3 (ref 0–0.7)
EOSINOPHIL NFR BLD AUTO: 4 % (ref 0.3–6.2)
ERYTHROCYTE [DISTWIDTH] IN BLOOD BY AUTOMATED COUNT: 14.1 % (ref 11.7–13)
GFR SERPL CREATININE-BSD FRML MDRD: 32 ML/MIN/1.73
GLOBULIN UR ELPH-MCNC: 3.6 GM/DL
GLUCOSE BLD-MCNC: 120 MG/DL (ref 65–99)
GLUCOSE BLDC GLUCOMTR-MCNC: 108 MG/DL (ref 70–130)
GLUCOSE BLDC GLUCOMTR-MCNC: 130 MG/DL (ref 70–130)
GLUCOSE BLDC GLUCOMTR-MCNC: 191 MG/DL (ref 70–130)
GLUCOSE BLDC GLUCOMTR-MCNC: 211 MG/DL (ref 70–130)
HCT VFR BLD AUTO: 35.9 % (ref 35.6–45.5)
HGB BLD-MCNC: 11 G/DL (ref 11.9–15.5)
IMM GRANULOCYTES # BLD: 0.03 10*3/MM3 (ref 0–0.03)
IMM GRANULOCYTES NFR BLD: 0.3 % (ref 0–0.5)
LYMPHOCYTES # BLD AUTO: 2.92 10*3/MM3 (ref 0.9–4.8)
LYMPHOCYTES NFR BLD AUTO: 30 % (ref 19.6–45.3)
MCH RBC QN AUTO: 28.5 PG (ref 26.9–32)
MCHC RBC AUTO-ENTMCNC: 30.6 G/DL (ref 32.4–36.3)
MCV RBC AUTO: 93 FL (ref 80.5–98.2)
MONOCYTES # BLD AUTO: 0.78 10*3/MM3 (ref 0.2–1.2)
MONOCYTES NFR BLD AUTO: 8 % (ref 5–12)
NEUTROPHILS # BLD AUTO: 5.58 10*3/MM3 (ref 1.9–8.1)
NEUTROPHILS NFR BLD AUTO: 57.5 % (ref 42.7–76)
PLATELET # BLD AUTO: 249 10*3/MM3 (ref 140–500)
PMV BLD AUTO: 9.8 FL (ref 6–12)
POTASSIUM BLD-SCNC: 4.4 MMOL/L (ref 3.5–5.2)
PROT SERPL-MCNC: 7.1 G/DL (ref 6–8.5)
RBC # BLD AUTO: 3.86 10*6/MM3 (ref 3.9–5.2)
SODIUM BLD-SCNC: 139 MMOL/L (ref 136–145)
WBC NRBC COR # BLD: 9.72 10*3/MM3 (ref 4.5–10.7)

## 2017-12-03 PROCEDURE — 63710000001 INSULIN ASPART PER 5 UNITS: Performed by: INTERNAL MEDICINE

## 2017-12-03 PROCEDURE — 82962 GLUCOSE BLOOD TEST: CPT

## 2017-12-03 PROCEDURE — 80053 COMPREHEN METABOLIC PANEL: CPT | Performed by: INTERNAL MEDICINE

## 2017-12-03 PROCEDURE — 85025 COMPLETE CBC W/AUTO DIFF WBC: CPT | Performed by: INTERNAL MEDICINE

## 2017-12-03 PROCEDURE — 25010000002 HEPARIN (PORCINE) PER 1000 UNITS: Performed by: INTERNAL MEDICINE

## 2017-12-03 PROCEDURE — 80162 ASSAY OF DIGOXIN TOTAL: CPT | Performed by: INTERNAL MEDICINE

## 2017-12-03 RX ADMIN — GLIPIZIDE 10 MG: 10 TABLET ORAL at 21:57

## 2017-12-03 RX ADMIN — SODIUM CHLORIDE 100 ML/HR: 9 INJECTION, SOLUTION INTRAVENOUS at 00:09

## 2017-12-03 RX ADMIN — INSULIN ASPART 4 UNITS: 100 INJECTION, SOLUTION INTRAVENOUS; SUBCUTANEOUS at 12:03

## 2017-12-03 RX ADMIN — HEPARIN SODIUM 5000 UNITS: 5000 INJECTION, SOLUTION INTRAVENOUS; SUBCUTANEOUS at 21:58

## 2017-12-03 RX ADMIN — DONEPEZIL HYDROCHLORIDE 10 MG: 10 TABLET, FILM COATED ORAL at 21:57

## 2017-12-03 RX ADMIN — METOPROLOL SUCCINATE 50 MG: 50 TABLET, FILM COATED, EXTENDED RELEASE ORAL at 09:27

## 2017-12-03 RX ADMIN — HEPARIN SODIUM 5000 UNITS: 5000 INJECTION, SOLUTION INTRAVENOUS; SUBCUTANEOUS at 00:09

## 2017-12-03 RX ADMIN — SODIUM CHLORIDE 60 ML/HR: 9 INJECTION, SOLUTION INTRAVENOUS at 11:52

## 2017-12-03 RX ADMIN — HEPARIN SODIUM 5000 UNITS: 5000 INJECTION, SOLUTION INTRAVENOUS; SUBCUTANEOUS at 09:27

## 2017-12-03 RX ADMIN — ASPIRIN 81 MG: 81 TABLET ORAL at 09:27

## 2017-12-03 RX ADMIN — INSULIN ASPART 2 UNITS: 100 INJECTION, SOLUTION INTRAVENOUS; SUBCUTANEOUS at 21:57

## 2017-12-03 NOTE — PROGRESS NOTES
Clinical Pharmacy Services: Medication History    Jada Keith is a 89 y.o. female presenting to [facility] for Non healing left heel wound [S91.302A]    She  has a past medical history of CHF (congestive heart failure); Coronary artery disease; DM type 2 (diabetes mellitus, type 2), new diagnosis (10/8/2017); GERD (gastroesophageal reflux disease); History of transfusion; Hypertension; and Lymphoma.    Allergies as of 12/02/2017 - Emilio as Reviewed 12/02/2017   Allergen Reaction Noted   • Sertraline Hallucinations 06/11/2012   • Risedronate sodium  06/11/2012   • Sulfa antibiotics Nausea And Vomiting 11/01/2016       Medication information was obtained from: Patients reported medication list as well as patients pharmacy     Pharmacy and Phone Number: [facility] [phone]    Prior to Admission Medications     Prescriptions Last Dose Informant Patient Reported? Taking?    aspirin 81 MG EC tablet 12/2/2017 Pharmacy No Yes    Take 1 tablet by mouth Daily.    calcium citrate-vitamin d (CITRACAL) 200-250 MG-UNIT tablet tablet 12/2/2017 Pharmacy Yes Yes    Take 1 tablet by mouth Daily.    digoxin (LANOXIN) 125 MCG tablet 12/2/2017 Pharmacy Yes Yes    Take 125 mcg by mouth Daily.    donepezil (ARICEPT) 10 MG tablet 12/2/2017 Pharmacy Yes Yes    Take 10 mg by mouth Every Night.    enalapril (VASOTEC) 20 MG tablet 12/2/2017 Pharmacy Yes Yes    Take 20 mg by mouth Daily.    furosemide (LASIX) 40 MG tablet 12/2/2017 Pharmacy Yes Yes    Take 40 mg by mouth 2 (Two) Times a Day.    glipiZIDE (GLUCOTROL) 10 MG tablet 12/2/2017 Pharmacy No Yes    Take 1 tablet by mouth 2 (Two) Times a Day Before Meals.    insulin aspart (novoLOG) 100 UNIT/ML injection  Pharmacy No No    Inject 0-9 Units under the skin 4 (Four) Times a Day With Meals & at Bedtime.    ivabradine HCl (CORLANOR) 5 MG tablet tablet 12/2/2017 Pharmacy Yes Yes    Take 7.5 mg by mouth Daily With Breakfast & Dinner.    metFORMIN (GLUCOPHAGE) 500 MG  tablet 12/2/2017 Pharmacy Yes Yes    Take 1,000 mg by mouth Daily With Breakfast.    metoprolol succinate XL (TOPROL-XL) 100 MG 24 hr tablet 12/2/2017 Pharmacy Yes Yes    Take 50 mg by mouth Daily.    muscle rub (Merrill-Loo) 10-15 % cream cream Past Month Pharmacy No Yes    Apply  topically Every 1 (One) Hour As Needed (leg pain/cramps).    spironolactone (ALDACTONE) 25 MG tablet 12/2/2017 Pharmacy Yes Yes    Take 25 mg by mouth Daily.            Medication notes: Patient reports that they have not been using insulin. Newly diagnosted type 2 diabetic, and is currently managed off just glipizde and metformin.     This medication list is complete to the best of my knowledge as of 12/2/2017    Please call if you have any questions.    Evangelista Hong  Medication History Technician   [id number]  12/2/2017 9:12 PM

## 2017-12-03 NOTE — ED PROVIDER NOTES
EMERGENCY DEPARTMENT ENCOUNTER    Room Number:  31/31  Date seen:  12/2/2017  Time seen: 7:09 PM  PCP: Alicia Leger MD    HPI:  Chief complaint:Wound Check  Context:Jada Keith is a 89 y.o. female who presents to the ED with c/o wound to right heel.Pt's family states the wound had green discharge and pt is reluctant to bear weight on wound.  Pt denies fever.Pt has hx of dementia and diabetes. According to pt's family, pt had righ hip replacement surgery in November and was released from rehab 11/12 and on 11/15 complained of pain in heel, without being informed of wound by rehabilitation. Pt is weight bearing on hip.     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 04/30/2017   • Chronic combined systolic and diastolic heart failure 04/30/2017   • Collapsed vertebra, not elsewhere classified, thoracic region, initial encounter for fracture 04/30/2017   • Diastolic dysfunction 04/30/2017   • Diffuse non-Hodgkin's lymphoblastic lymphoma 04/30/2017   • Diverticulosis of intestine 04/30/2017   • Uterine leiomyoma 04/30/2017   • Gastroesophageal reflux disease 06/01/1994   • Memory loss 04/30/2017   • Moderate mitral regurgitation 04/30/2017   • Obstructive sleep apnea syndrome 04/30/2017   • Osteoporosis with pathological fracture 04/30/2017   • Paroxysmal supraventricular tachycardia 04/30/2017   • Rectal disorder 04/30/2017   • Sinus tachycardia 10/01/2015   • Acute systolic (congestive) heart failure 04/30/2017   • Acute pulmonary edema 04/30/2017   •  Pedal edema 04/30/2017   • Acute respiratory failure with hypoxemia 04/30/2017   • Pleural effusion on right 04/30/2017   • Essential hypertension 04/30/2017   • Acute kidney injury 10/07/2017   • Closed subcapital fracture of right femur 10/07/2017   • Leukocytosis 10/07/2017   • DM type 2 (diabetes mellitus, type 2), new diagnosis 10/08/2017   • Hyponatremia 10/09/2017   • DNR (do not resuscitate) 10/12/2017     Resolved Ambulatory Problems     Diagnosis Date Noted   • No Resolved Ambulatory Problems     Past Medical History:   Diagnosis Date   • CHF (congestive heart failure)    • Coronary artery disease    • DM type 2 (diabetes mellitus, type 2), new diagnosis 10/8/2017   • GERD (gastroesophageal reflux disease)    • History of transfusion    • Hypertension    • Lymphoma        PAST SURGICAL HISTORY  Past Surgical History:   Procedure Laterality Date   • BACK SURGERY     • CARDIAC CATHETERIZATION     • CHOLECYSTECTOMY     • HYSTERECTOMY     • NJ PARTIAL HIP REPLACEMENT Right 10/8/2017    Procedure: CEMENTED BIPOLAR;  Surgeon: Michael England MD;  Location: [facility];  Service: Orthopedics   • TUMOR REMOVAL         FAMILY HISTORY  Family History   Problem Relation Age of Onset   • Family history unknown: Yes       SOCIAL HISTORY  Social History     Social History   • Marital status:      Spouse name: N/A   • Number of children: N/A   • Years of education: N/A     Occupational History   • Not on file.     Social History Main Topics   • Smoking status: Never Smoker   • Smokeless tobacco: Not on file   • Alcohol use No   • Drug use: No   • Sexual activity: Defer     Other Topics Concern   • Not on file     Social History Narrative    Exercises at [employer]       REVIEW OF SYSTEMS  Review of Systems   Constitutional: Negative for activity change, appetite change, diaphoresis and fever.   HENT: Negative for sore throat and trouble swallowing.    Eyes: Negative.  Negative for visual disturbance.   Respiratory:  Negative for cough, chest tightness, shortness of breath and wheezing.    Cardiovascular: Negative for chest pain, palpitations and leg swelling.   Gastrointestinal: Negative for abdominal pain, diarrhea, nausea and vomiting.   Genitourinary: Negative for dysuria.   Musculoskeletal: Negative for back pain and neck pain.   Skin: Positive for wound (right heel). Negative for rash.   Allergic/Immunologic: Negative.    Neurological: Negative for dizziness, speech difficulty, weakness, light-headedness, numbness and headaches.   Hematological: Negative.    Psychiatric/Behavioral: Negative.    All other systems reviewed and are negative.      PHYSICAL EXAM  ED Triage Vitals   Temp Heart Rate Resp BP SpO2   12/02/17 1808 12/02/17 1806 12/02/17 1806 12/02/17 1821 12/02/17 1806   96.7 °F (35.9 °C) 62 18 138/49 98 %      Temp src Heart Rate Source Patient Position BP Location FiO2 (%)   12/02/17 1808 12/02/17 1806 12/02/17 1821 12/02/17 1821 --   Tympanic Monitor Lying Left arm      Physical Exam   Constitutional: She is oriented to person, place, and time and well-developed, well-nourished, and in no distress. No distress.   HENT:   Head: Normocephalic and atraumatic.   Mouth/Throat: Uvula is midline and mucous membranes are normal.   Neck: Normal range of motion. Neck supple.   Cardiovascular: S1 normal, S2 normal and normal heart sounds.  Exam reveals no gallop and no friction rub.    No murmur heard.  Pulmonary/Chest: Effort normal and breath sounds normal. She has no decreased breath sounds. She has no wheezes. She has no rhonchi. She has no rales.   Abdominal: Soft. Normal appearance. There is no rebound and no guarding.   Musculoskeletal: Normal range of motion.        Right lower leg: She exhibits no swelling.        Left lower leg: She exhibits no swelling.   Neurological: She is alert and oriented to person, place, and time.   Skin: Skin is warm and dry.   Right heel - quarter sized area of eschar appearing tissue,  surrounded by area of erythema.   Healed right hip replacement surgical incision site.    Psychiatric: Affect and judgment normal.   Nursing note and vitals reviewed.      LAB RESULTS  Recent Results (from the past 24 hour(s))   Basic Metabolic Panel    Collection Time: 12/02/17  7:47 PM   Result Value Ref Range    Glucose 129 (H) 65 - 99 mg/dL    BUN 71 (H) 8 - 23 mg/dL    Creatinine 2.25 (H) 0.57 - 1.00 mg/dL    Sodium 139 136 - 145 mmol/L    Potassium 4.9 3.5 - 5.2 mmol/L    Chloride 98 98 - 107 mmol/L    CO2 23.9 22.0 - 29.0 mmol/L    Calcium 9.4 8.2 - 9.6 mg/dL    eGFR Non African Amer 20 (L) >60 mL/min/1.73    BUN/Creatinine Ratio 31.6 (H) 7.0 - 25.0    Anion Gap 17.1 mmol/L   CBC Auto Differential    Collection Time: 12/02/17  7:47 PM   Result Value Ref Range    WBC 10.35 4.50 - 10.70 10*3/mm3    RBC 3.75 (L) 3.90 - 5.20 10*6/mm3    Hemoglobin 10.6 (L) 11.9 - 15.5 g/dL    Hematocrit 34.3 (L) 35.6 - 45.5 %    MCV 91.5 80.5 - 98.2 fL    MCH 28.3 26.9 - 32.0 pg    MCHC 30.9 (L) 32.4 - 36.3 g/dL    RDW 14.2 (H) 11.7 - 13.0 %    RDW-SD 47.6 37.0 - 54.0 fl    MPV 9.4 6.0 - 12.0 fL    Platelets 256 140 - 500 10*3/mm3    Neutrophil % 64.5 42.7 - 76.0 %    Lymphocyte % 23.7 19.6 - 45.3 %    Monocyte % 7.5 5.0 - 12.0 %    Eosinophil % 3.7 0.3 - 6.2 %    Basophil % 0.3 0.0 - 1.5 %    Immature Grans % 0.3 0.0 - 0.5 %    Neutrophils, Absolute 6.68 1.90 - 8.10 10*3/mm3    Lymphocytes, Absolute 2.45 0.90 - 4.80 10*3/mm3    Monocytes, Absolute 0.78 0.20 - 1.20 10*3/mm3    Eosinophils, Absolute 0.38 0.00 - 0.70 10*3/mm3    Basophils, Absolute 0.03 0.00 - 0.20 10*3/mm3    Immature Grans, Absolute 0.03 0.00 - 0.03 10*3/mm3       I ordered the above labs and reviewed the results    RADIOLOGY  XR Foot 3+ View Right   Preliminary Result   No acute fracture or dislocation. The ulcer is not clearly visualized on   x-ray examination however mild inflammatory changes are seen in the sole   of the subcutaneous fat.              I  "ordered the above noted radiological studies and reviewed the images on the PACS system.    MEDICATIONS GIVEN IN ER  Medications   sodium chloride 0.9 % bolus 500 mL (500 mL Intravenous New Bag 12/2/17 2045)       EKG  Interpreted by ED Physician    PROCEDURES  Procedures    COURSE & MEDICAL DECISION MAKING  Pertinent Labs and Imaging studies that were ordered and reviewed are noted above.  Results were reviewed/discussed with the patient and they were also made aware of online assess.  Pt also made aware that some labs, such as cultures, will not be resulted during ER visit and follow up with PMD is necessary.     PROGRESS AND CONSULTS    Progress Notes:    ED Course     2014  Reviewed pt's history and workup with Dr. Burns.  After a bedside evaluation; Dr Burns agrees with the plan of care    2016  Call placed to [facility].    2029  Call with Dr. Levy ([facility]) who agreed to admit pt due to decubitus to left heel and plans for debridement.     2030  Pt rechecked, discussed plan for admission due to heel wound and elevated labs. Pt and family understand and agree with plan, all questions addressed.     Based on the patient's lab findings and presenting symptoms, the doctor and I feel it is appropriate to admit the patient for further management, evaluation, and treatment.  I have discussed this with the admitting team.  I have also discussed this with the patient/family.  They are in agreement with admission.        Disposition vitals:  /45  Pulse 62  Temp 96.7 °F (35.9 °C) (Tympanic)   Resp 18  Ht 61\" (154.9 cm)  Wt 123 lb (55.8 kg)  SpO2 97%  BMI 23.24 kg/m2      DIAGNOSIS  Final diagnoses:   Non healing left heel wound   Acute nontraumatic kidney injury     Documentation assistance provided by vanessa Sanders for NIKI Ames.  Information recorded by the vanessa was done at my direction and has been verified and validated by me.  Electronically signed by Patricia Sanders on 12/2/2017 at time " 8:48 PM                           Patricia Sanders  12/02/17 2031       Meche Covarrubias, NIKI  12/02/17 2049

## 2017-12-03 NOTE — PLAN OF CARE
Problem: Patient Care Overview (Adult)  Goal: Plan of Care Review  Outcome: Ongoing (interventions implemented as appropriate)    12/03/17 0633   Coping/Psychosocial Response Interventions   Plan Of Care Reviewed With patient   Patient Care Overview   Progress no change   Outcome Evaluation   Outcome Summary/Follow up Plan vitals stable. pt denies pain. wound consult for today. will continue to monitor.          Problem: Fall Risk (Adult)  Goal: Identify Related Risk Factors and Signs and Symptoms  Outcome: Ongoing (interventions implemented as appropriate)  Goal: Absence of Falls  Outcome: Ongoing (interventions implemented as appropriate)    Problem: Skin Integrity Impairment, Risk/Actual (Adult)  Goal: Identify Related Risk Factors and Signs and Symptoms  Outcome: Ongoing (interventions implemented as appropriate)  Goal: Skin Integrity/Wound Healing  Outcome: Ongoing (interventions implemented as appropriate)

## 2017-12-03 NOTE — ED PROVIDER NOTES
Discussed pt's case with Meche Satish.   Pt presents to ER c/o needing a wound check. PT has a wound to the bottom of her R foot with report of green discharge earlier today. Pt was recently released from physical therapy for a hip injury. A home nurse evaluated pt today and recommended pt come into the ER for further evaluation. Discussed that pt will msot likely be admitted to the hospital for treatment. Plan to have wound care evaluate the wound. On exam, pt has a large decubitus wound to the R heel, un-stageable, and foot is not tender to palpation. Pt has good intact distal pulses.     I supervised care provided by the midlevel provider.    We have discussed this patient's history, physical exam, and treatment plan.   I have reviewed the note and personally saw and examined the patient and agree with the plan of care.    Documentation assistance provided by vanessa Marsh for Dr. Burns.  Information recorded by the scribe was done at my direction and has been verified and validated by me.            Leonides Marsh  12/02/17 2100       Guillermo Burns MD  12/03/17 015

## 2017-12-03 NOTE — H&P
Internal medicine history and physical   INTERNAL MEDICINE   [facility]       Patient Identification:  Name: Jada Keith  Age: 89 y.o.  Sex: female  :  4/10/1926  MRN: [id number]                   Primary Care Physician: Alicia Leger MD                                   Chief Complaint:  Sent to the hospital by visiting nurse who was concerned about green drainage from right heel wound.    History of Present Illness:   Patient is a 89-year-old female who recently got discharged from rehabilitation facility after recovering from repair of the right hip fracture.  According to daughter she has this wound that was present when she left the rehabilitation and is not getting any better.  There is some drainage couple days ago and today the nurse noticed that he was turning green resulting in this interaction.  Patient's daughter also stated that she is not eating and drinking as much as she used to prior to this event.  Patient herself denies any specific symptoms of chest pain shortness of breath nausea vomiting diarrhea weakness fever chills decreased appetite or worsening pain in the right heel area.  Patient denies any dizziness upon sitting up and standing.  She disagrees with her daughter's assessment that she is not eating much.  Patient is otherwise active and up and about.   Past Medical History:  Past Medical History:   Diagnosis Date   • CHF (congestive heart failure)    • Coronary artery disease    • DM type 2 (diabetes mellitus, type 2), new diagnosis 10/8/2017   • GERD (gastroesophageal reflux disease)    • History of transfusion    • Hypertension    • Lymphoma      Past Surgical History:  Past Surgical History:   Procedure Laterality Date   • BACK SURGERY     • CARDIAC CATHETERIZATION     • CHOLECYSTECTOMY     • HYSTERECTOMY     • NJ PARTIAL HIP REPLACEMENT Right 10/8/2017    Procedure: CEMENTED BIPOLAR;  Surgeon: Michael England MD;  Location: [facility];  Service:  Orthopedics   • TUMOR REMOVAL        Home Meds:  Prescriptions Prior to Admission   Medication Sig Dispense Refill Last Dose   • aspirin 81 MG EC tablet Take 1 tablet by mouth Daily.   12/2/2017 at Unknown time   • calcium citrate-vitamin d (CITRACAL) 200-250 MG-UNIT tablet tablet Take 1 tablet by mouth Daily.   12/2/2017 at Unknown time   • digoxin (LANOXIN) 125 MCG tablet Take 125 mcg by mouth Daily.   12/2/2017 at Unknown time   • donepezil (ARICEPT) 10 MG tablet Take 10 mg by mouth Every Night.   12/2/2017 at Unknown time   • enalapril (VASOTEC) 20 MG tablet Take 20 mg by mouth Daily.   12/2/2017 at Unknown time   • furosemide (LASIX) 40 MG tablet Take 40 mg by mouth 2 (Two) Times a Day.   12/2/2017 at Unknown time   • glipiZIDE (GLUCOTROL) 10 MG tablet Take 1 tablet by mouth 2 (Two) Times a Day Before Meals.   12/2/2017 at Unknown time   • ivabradine HCl (CORLANOR) 5 MG tablet tablet Take 7.5 mg by mouth Daily With Breakfast & Dinner.   12/2/2017 at Unknown time   • metFORMIN (GLUCOPHAGE) 500 MG tablet Take 1,000 mg by mouth Daily With Breakfast.   12/2/2017 at Unknown time   • metoprolol succinate XL (TOPROL-XL) 100 MG 24 hr tablet Take 100 mg by mouth Daily.   12/2/2017 at Unknown time   • muscle rub (Merrill-Loo) 10-15 % cream cream Apply  topically Every 1 (One) Hour As Needed (leg pain/cramps).  0 Past Month at Unknown time   • spironolactone (ALDACTONE) 25 MG tablet Take 25 mg by mouth Daily.   12/2/2017 at Unknown time   • insulin aspart (novoLOG) 100 UNIT/ML injection Inject 0-9 Units under the skin 4 (Four) Times a Day With Meals & at Bedtime.  12      Current Meds:   No current facility-administered medications for this encounter.   Allergies:  Allergies   Allergen Reactions   • Sertraline Hallucinations     Weakness   • Risedronate Sodium      Leg cramps   • Sulfa Antibiotics Nausea And Vomiting     Social History:   Social History   Substance Use Topics   • Smoking status: Never Smoker   • Smokeless  "tobacco: Not on file   • Alcohol use No      Family History:  Family History   Problem Relation Age of Onset   • Family history unknown: Yes          Review of Systems  See history of present illness and past medical history.  Constitutional: Negative for activity change, appetite change, diaphoresis and fever.   HENT: Negative for sore throat and trouble swallowing.    Eyes: Negative.  Negative for visual disturbance.   Respiratory: Negative for cough, chest tightness, shortness of breath and wheezing.    Cardiovascular: Negative for chest pain, palpitations and leg swelling.   Gastrointestinal: Negative for abdominal pain, diarrhea, nausea and vomiting.   Genitourinary: Negative for dysuria.   Musculoskeletal: Negative for back pain and neck pain.   Skin: Positive for wound (right heel). Negative for rash.   Allergic/Immunologic: Negative.    Neurological: Negative for dizziness, speech difficulty, weakness, light-headedness, numbness and headaches.   Hematological: Negative.    Psychiatric/Behavioral: Negative.      Vitals:   /78 (BP Location: Left arm, Patient Position: Lying)  Pulse 80  Temp 97.1 °F (36.2 °C) (Oral)   Resp 16  Ht 61\" (154.9 cm)  Wt 123 lb (55.8 kg)  SpO2 97%  BMI 23.24 kg/m2  I/O:   Intake/Output Summary (Last 24 hours) at 12/02/17 2239  Last data filed at 12/02/17 2045   Gross per 24 hour   Intake              500 ml   Output                0 ml   Net              500 ml     Exam:  General Appearance:    Alert, cooperative, no distress, appears stated age   Head:    Normocephalic, without obvious abnormality, atraumatic   Eyes:    PERRL, conjunctiva/corneas clear, EOM's intact, both eyes   Ears:    Normal external ear canals, both ears   Nose:   Nares normal, septum midline, mucosa normal, no drainage    or sinus tenderness   Throat:   Lips, tongue, gums normal; oral mucosa pink and moist   Neck:   Supple, symmetrical, trachea midline, no adenopathy;     thyroid:  no " enlargement/tenderness/nodules; no carotid    bruit or JVD   Back:     Symmetric, no curvature, ROM normal, no CVA tenderness   Lungs:     Clear to auscultation bilaterally, respirations unlabored   Chest Wall:    No tenderness or deformity    Heart:    Regular rate and rhythm, S1 and S2 normal, no murmur, rub   or gallop   Abdomen:     Soft, non-tender, bowel sounds active all four quadrants,     no masses, no hepatomegaly, no splenomegaly   Extremities:   Right heel on the posterior aspect of the black eschar with minimal erythema around it.  Right hip surgical site is well-healed.     Pulses:   Pulses palpable in all extremities; symmetric all extremities   Skin:   Skin color normal, Skin is warm and dry,  no rashes or palpable lesions   Neurologic:   CNII-XII intact, motor strength grossly intact, sensation grossly intact to light touch, no focal deficits noted       Data Review:      I reviewed the patient's new clinical results.    Results from last 7 days  Lab Units 12/02/17  1947   WBC 10*3/mm3 10.35   HEMOGLOBIN g/dL 10.6*   PLATELETS 10*3/mm3 256       Results from last 7 days  Lab Units 12/02/17  1947   SODIUM mmol/L 139   POTASSIUM mmol/L 4.9   CHLORIDE mmol/L 98   CO2 mmol/L 23.9   BUN mg/dL 71*   CREATININE mg/dL 2.25*   CALCIUM mg/dL 9.4   GLUCOSE mg/dL 129*     RIGHT FOOT 3 VIEWS.     HISTORY: Evaluate heel decubitus.     COMPARISON: No prior studies for comparison.     FINDINGS:  There is no fracture or dislocation. Mild fat stranding at this will of  the foot in keeping with known diagnosis of decubitus ulcer. The ulcer  itself is not definitively visualized.     Soft tissue structures are unremarkable.           Impression:         No acute fracture or dislocation. The ulcer is not clearly visualized on  x-ray examination however mild inflammatory changes are seen in the sole  of the subcutaneous fat.          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     1-right heel decubital wound with no clear evidence of active infection at this time plan: wound care consult and arrangements to avoid further pressure with low threshold to treat with antibiotic therapy if there is spreading cellulitis or if she shows systemic signs of sepsis.  The coverage would be to cover for hospital-acquired pathogen such as MRSA and pseudomonas and so forth.  2-acute renal failure with elevated BUN/creatinine which was normal on 10/12/2017 with BUN BUN 15 and creatinine 0.84 today BUN is 71 and creatinine 2.25 - likely causes 1-dehydration as well as 2-ACE inhibitor's contributing to that. Ironically this acute renal failure is essentially asymptomatic as patient does not have signs and symptoms of volume depletion such as orthostatic dizziness plan: Cautious hydration hold Vasotec and metformin and consider nephrology evaluation  3-status post right femur subcapital fracture repair  4-diabetes mellitus and to continue glipizide, hold her metformin, provide with Accu-Cheks and sliding scale coverage  5-hypertension plan is to continue with metoprolol,  DC Vasotec, cautious hydration  6-anemia, plan iron studies.  7-underlying dementia - continue Aricept      Alejandra Levy MD   12/2/2017  10:39 PM  Much of this encounter note is an electronic transcription/translation of spoken language to printed text. The electronic translation of spoken language may permit erroneous, or at times, nonsensical words or phrases to be inadvertently transcribed; Although I have reviewed the note for such errors, some may still exist

## 2017-12-03 NOTE — PROGRESS NOTES
[facility]     LOS: 1 day     Name: Jada Keith  Age: 89 y.o.  Sex: female  :  4/10/1926  MRN: [id number]         Primary Care Physician: Alicia Leger MD    Diet Regular; Cardiac, Consistent Carbohydrate, Renal    Subjective   States right heel is sore. No chest pain, shortness of breath. Time: 35 minutes, greater than 50% spent in coordination of care.    Objective   Temp:  [96.7 °F (35.9 °C)-98.4 °F (36.9 °C)] 97.4 °F (36.3 °C)  Heart Rate:  [61-80] 68  Resp:  [16-18] 16  BP: (109-138)/(37-78) 127/49  SpO2:  [95 %-98 %] 95 %  on   ;   O2 Device: room air  Body mass index is 23.04 kg/(m^2).    Physical Exam   Constitutional: No distress.   Cardiovascular: Normal rate and regular rhythm.    Pulmonary/Chest: Effort normal and breath sounds normal. No respiratory distress.   Abdominal: Soft. There is no tenderness. There is no rebound and no guarding.   Musculoskeletal: She exhibits no edema.   Neurological: She is alert.   Oriented to hospital, situation, self but not year ()   Skin:   Right heel ulcer. No significant surrounding erythema. Tender.     Reviewed medications and new clinical results    aspirin 81 mg Oral Daily   donepezil 10 mg Oral Nightly   glipiZIDE 10 mg Oral Nightly   heparin (porcine) 5,000 Units Subcutaneous Q12H   insulin aspart 0-9 Units Subcutaneous 4x Daily With Meals & Nightly   insulin aspart 0-9 Units Subcutaneous 4x Daily With Meals & Nightly   ivabradine HCl 7.5 mg Oral Daily With Breakfast & Dinner   metoprolol succinate XL 50 mg Oral Daily     sodium chloride 100 mL/hr Last Rate: 100 mL/hr (17 000)     Results from last 7 days  Lab Units 17   WBC 10*3/mm3 9.72 10.35   HEMOGLOBIN g/dL 11.0* 10.6*   PLATELETS 10*3/mm3 249 256     Results from last 7 days  Lab Units 1739 17   SODIUM mmol/L 139 139   POTASSIUM mmol/L 4.4 4.9   CHLORIDE mmol/L 102 98   CO2 mmol/L 24.5  23.9   BUN mg/dL 56* 71*   CREATININE mg/dL 1.54* 2.25*   CALCIUM mg/dL 9.1 9.4   GLUCOSE mg/dL 120* 129*     Glucose   Date/Time Value Ref Range Status   12/03/2017 0639 130 70 - 130 mg/dL Final     Estimated Creatinine Clearance: 20.8 mL/min (by C-G formula based on Cr of 1.54).    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     · MARV improving, EF 22% this year will decrease IVF rate. Holding ACE-I, diuretic, metformin. BP stable  · WOCN to see right heel. No antibiotics at this time. No systemic signs of sepsis  · Last admission she was DNR (right hip). Will need to clarify  · A1c was 10.95 last admission. Holding metformin.    Xander Apodaca MD   12/03/17  10:22 AM

## 2017-12-03 NOTE — PLAN OF CARE
Problem: Patient Care Overview (Adult)  Goal: Plan of Care Review  Outcome: Ongoing (interventions implemented as appropriate)    12/03/17 1720   Coping/Psychosocial Response Interventions   Plan Of Care Reviewed With patient;daughter   Patient Care Overview   Progress no change   Outcome Evaluation   Outcome Summary/Follow up Plan No discharge or odor noted on assessment this shift. Black eschar covering heel wound. Wound nurse has been consulted. No abx as of yet from ID. Pt has been ambulating in room with assist x1. Code status verified and changed today. Vitals stable. New IV placed after pt pulled out IV. IVF continued. Pt has pt supplied med not supplied at hospital awaiting verification from pharmacy. Pt has refused pain meds today. Will continue to monitor.       Goal: Adult Individualization and Mutuality  Outcome: Ongoing (interventions implemented as appropriate)  Goal: Discharge Needs Assessment  Outcome: Ongoing (interventions implemented as appropriate)    Problem: Fall Risk (Adult)  Goal: Identify Related Risk Factors and Signs and Symptoms  Outcome: Ongoing (interventions implemented as appropriate)  Goal: Absence of Falls  Outcome: Ongoing (interventions implemented as appropriate)    Problem: Skin Integrity Impairment, Risk/Actual (Adult)  Goal: Identify Related Risk Factors and Signs and Symptoms  Outcome: Ongoing (interventions implemented as appropriate)  Goal: Skin Integrity/Wound Healing  Outcome: Ongoing (interventions implemented as appropriate)    Problem: Infection, Risk/Actual (Adult)  Goal: Identify Related Risk Factors and Signs and Symptoms  Outcome: Ongoing (interventions implemented as appropriate)  Goal: Infection Prevention/Resolution  Outcome: Ongoing (interventions implemented as appropriate)    Problem: Pain, Acute (Adult)  Goal: Identify Related Risk Factors and Signs and Symptoms  Outcome: Ongoing (interventions implemented as appropriate)  Goal: Acceptable Pain  Control/Comfort Level  Outcome: Ongoing (interventions implemented as appropriate)

## 2017-12-04 LAB
ANION GAP SERPL CALCULATED.3IONS-SCNC: 11.2 MMOL/L
BUN BLD-MCNC: 40 MG/DL (ref 8–23)
BUN/CREAT SERPL: 32 (ref 7–25)
CALCIUM SPEC-SCNC: 8.8 MG/DL (ref 8.2–9.6)
CHLORIDE SERPL-SCNC: 107 MMOL/L (ref 98–107)
CO2 SERPL-SCNC: 24.8 MMOL/L (ref 22–29)
CREAT BLD-MCNC: 1.25 MG/DL (ref 0.57–1)
CRP SERPL-MCNC: 0.32 MG/DL (ref 0–0.5)
DEPRECATED RDW RBC AUTO: 47.9 FL (ref 37–54)
ERYTHROCYTE [DISTWIDTH] IN BLOOD BY AUTOMATED COUNT: 14.2 % (ref 11.7–13)
GFR SERPL CREATININE-BSD FRML MDRD: 40 ML/MIN/1.73
GLUCOSE BLD-MCNC: 59 MG/DL (ref 65–99)
GLUCOSE BLDC GLUCOMTR-MCNC: 109 MG/DL (ref 70–130)
GLUCOSE BLDC GLUCOMTR-MCNC: 165 MG/DL (ref 70–130)
GLUCOSE BLDC GLUCOMTR-MCNC: 168 MG/DL (ref 70–130)
GLUCOSE BLDC GLUCOMTR-MCNC: 186 MG/DL (ref 70–130)
GLUCOSE BLDC GLUCOMTR-MCNC: 66 MG/DL (ref 70–130)
HCT VFR BLD AUTO: 34.2 % (ref 35.6–45.5)
HGB BLD-MCNC: 10.5 G/DL (ref 11.9–15.5)
MCH RBC QN AUTO: 28.5 PG (ref 26.9–32)
MCHC RBC AUTO-ENTMCNC: 30.7 G/DL (ref 32.4–36.3)
MCV RBC AUTO: 92.9 FL (ref 80.5–98.2)
PLATELET # BLD AUTO: 206 10*3/MM3 (ref 140–500)
PMV BLD AUTO: 9.7 FL (ref 6–12)
POTASSIUM BLD-SCNC: 4 MMOL/L (ref 3.5–5.2)
RBC # BLD AUTO: 3.68 10*6/MM3 (ref 3.9–5.2)
SODIUM BLD-SCNC: 143 MMOL/L (ref 136–145)
WBC NRBC COR # BLD: 9.1 10*3/MM3 (ref 4.5–10.7)

## 2017-12-04 PROCEDURE — 82962 GLUCOSE BLOOD TEST: CPT

## 2017-12-04 PROCEDURE — 97162 PT EVAL MOD COMPLEX 30 MIN: CPT

## 2017-12-04 PROCEDURE — 25010000002 HEPARIN (PORCINE) PER 1000 UNITS: Performed by: INTERNAL MEDICINE

## 2017-12-04 PROCEDURE — 85027 COMPLETE CBC AUTOMATED: CPT | Performed by: HOSPITALIST

## 2017-12-04 PROCEDURE — 63710000001 INSULIN ASPART PER 5 UNITS: Performed by: HOSPITALIST

## 2017-12-04 PROCEDURE — 80048 BASIC METABOLIC PNL TOTAL CA: CPT | Performed by: HOSPITALIST

## 2017-12-04 PROCEDURE — 86140 C-REACTIVE PROTEIN: CPT | Performed by: HOSPITALIST

## 2017-12-04 RX ADMIN — HEPARIN SODIUM 5000 UNITS: 5000 INJECTION, SOLUTION INTRAVENOUS; SUBCUTANEOUS at 22:33

## 2017-12-04 RX ADMIN — ASPIRIN 81 MG: 81 TABLET ORAL at 11:30

## 2017-12-04 RX ADMIN — SODIUM CHLORIDE 60 ML/HR: 9 INJECTION, SOLUTION INTRAVENOUS at 04:12

## 2017-12-04 RX ADMIN — HEPARIN SODIUM 5000 UNITS: 5000 INJECTION, SOLUTION INTRAVENOUS; SUBCUTANEOUS at 11:34

## 2017-12-04 RX ADMIN — DONEPEZIL HYDROCHLORIDE 10 MG: 10 TABLET, FILM COATED ORAL at 22:33

## 2017-12-04 RX ADMIN — INSULIN ASPART 2 UNITS: 100 INJECTION, SOLUTION INTRAVENOUS; SUBCUTANEOUS at 18:21

## 2017-12-04 RX ADMIN — INSULIN ASPART 2 UNITS: 100 INJECTION, SOLUTION INTRAVENOUS; SUBCUTANEOUS at 11:12

## 2017-12-04 NOTE — NURSING NOTE
12/04/17 1112   Pressure Ulcer 12/02/17 2227 Right heel unstageable   Date first assessed/Time first assessed: 12/02/17 2227   Present On Admission (Pressure Ulcer): yes;picture taken  Side: Right  Location: heel  Stage: unstageable   Dressing Appearance (open to air)   Pressure Ulcer Appearance black eschar   Periwound Area redness;blanchable   Length (Pressure Ulcer) (cm) 3   Width (Pressure Ulcer) (cm) 2   Pressure Ulcer Therapeutic Interventions (apply betadine and wrap gauze)

## 2017-12-04 NOTE — PROGRESS NOTES
"Adult Nutrition  Assessment/PES    Patient Name:  Jada Keith  YOB: 1926  MRN: [id number]  Admit Date:  12/2/2017    Assessment Date:  12/4/2017    Comments:  Pt seen & assessed - met criteria for moderate malnutrition/acute illness. See MSA note.    Recommend liberalizing diet (dc renal & heart restrictions) as pt with very poor appetite/intake, significant weight loss since her hip surgery.     Would also benefit from a daily mvi/mineral for wound healing support. Currently has high antwan/pro supplement ordered but not drinking. Educated pt on need for ^ kcals, protein. D/w nursing staff - mix with chocolate ice cream, pt said she would maybe drink. Other food preferences obtained.     Following.           Reason for Assessment       12/04/17 1036    Reason for Assessment    Reason For Assessment/Visit nurse/nurse practitioner consult;skin risk    Identified At Risk By Screening Criteria MST SCORE 2+;large or nonhealing wound, burn or pressure ulcer    Cardiac Cardiomyopathy    Endocrine DM Type 2    Neurological Dementia    Ortho THR   November    Renal MARV    Skin Pressure ulcer              Nutrition/Diet History       12/04/17 1038    Nutrition/Diet History    Typical Food/Fluid Intake Cereal/milk, fruit at bfkt; willing to add HB egg for protein. Likes a variety of things for lunch, dinner, but never a \"big eater.\"    Food Preferences Anything chocolate    Supplemental Drinks/Foods/Additives has not been drinking the ordered supplement (Nepro on tray) - said she was too full. Explained we could mix with ice cream later for snack - d/w JOSIE Aguilar.    Typical Activity Patterns sedentary    Reported/Observed By Family;RN;MD;RD/Tech;Patient    Appetite Poor;Other   poor since her hip surgery last month    Food Habit/Preferences Small Meals    Mental State/Condition Weakness;Pain;Impaired Cognitive Status/Motor Control    Other Recent R hip surgery, has been at a rehab center. Admitted due to pain " "in R heel, green drainage. Dtr reported pt not eating well. I asked pt about this - she said no appetite lately but can't tell if she's lost weight.             Anthropometrics       12/04/17 1041    Anthropometrics    RD Documented Current Weight  54.9 kg (121 lb)    RD Documented Weight on Admission 55.8 kg (123 lb)    Anthropometrics (Special Considerations)    Height Used for Calculations 1.549 m (5' 1\")    RD Calculated     RD Calculated BMI (kg/m2) 23    Usual Body Weight (UBW)    Usual Body Weight 62.6 kg (138 lb)   5/2017; pt tells RD that UBW thru adult life in 130's    % Usual Body Weight Malnutrition 80-90% - mild deficit    Weight Loss 6.804 kg (15 lb)    % Weight Loss  10 %    Weight Loss Time Frame in less than 6 mos; pt tells me if she has lost weight, it's been since her hip surgery which was last month.     Body Mass Index (BMI)    BMI Grade 19.1 - 24.9 - normal            Labs/Tests/Procedures/Meds       12/04/17 1052    Labs/Tests/Procedures/Meds    Diagnostic Test/Procedure Review reviewed    Labs/Tests Review Reviewed;Creat;BUN;Glucose;Hgb Hct    Medication Review Reviewed, pertinent;Diabetic Oral Agent;Insulin    Significant Vitals reviewed            Physical Findings       12/04/17 1052    Physical Findings/Assessment    Additional Documentation Physical Appearance (Group)    Physical Appearance    Overall Physical Appearance loss of muscle mass   visible muscle mass loss in temporal area, bilat LE and pectoralis    Skin pressure ulcer(s)            Estimated/Assessed Needs       12/04/17 1053    Calculation Measurements    Weight Used For Calculations 54.9 kg (121 lb)    Estimated/Assessed Energy Needs    Energy Need Method Kcal/kg    kcal/kg 35    35 Kcal/Kg (kcal) 1920.98    Estimated Kcal Range  5908-7709    Estimated/Assessed Protein Needs    Weight Used for Protein Calculation 54.9 kg (121 lb)    Protein (gm/kg) 1.2    1.2 Gm Protein (gm) 65.86            Nutrition Prescription " Ordered       12/04/17 1053    Nutrition Prescription PO    Supplement Glucerna Shake    Supplement Frequency 3 times a day    Common Modifiers Renal;Cardiac;Consistent Carbohydrate            Evaluation of Received Nutrient/Fluid Intake       12/04/17 1054    PO Evaluation    Number of Meals 2    % PO Intake 75 & 25%              Malnutrition Severity Assessment       12/04/17 1055    Malnutrition Severity Assessment    Malnutrition Type Acute Illness/Injury Malnutrition    Physical Signs of Malnutrition (Acute)    Muscle Wasting Mild   mild temporal hollowing; visible clavicle (lack of pectoral muscle); bilat LE lacks tone in calf    Secondary Physical Signs Present (comment)   pressure ulcer on R heel (unstageable)    Weight Status (Acute)    %UBW Mild (86-90%)    Weight Loss Severe (>5% / 1 mo)    Energy Intake Status (Acute)    Energy Intake Mod (<75% / >7d)    Criteria Met (Must meet criteria for severity in at least 2 of these categories: M Wasting, Fat Loss, Fluid, Secondary Signs, Wt. Status, Intake)    Patient meets criteria for  Moderate malnutrition        Problem/Interventions:        Problem 1       12/04/17 1106    Nutrition Diagnoses Problem 1    Problem 1 Malnutrition    Etiology (related to) Factors Affecting Nutrition    Appetite Poor;Fair    Signs/Symptoms (evidenced by) Unintended Weight Change;Other (comment)   pressure ulcer    Unintended Weight Change Loss    Number of Pounds Lost 15lb    Weight loss time period 1 month                    Intervention Goal       12/04/17 1106    Intervention Goal    General Maintain nutrition;Disease management/therapy;Reduce/improve symptoms;Meet nutritional needs for age/condition;Provide information regarding MNT for treatment/condition    PO Increase intake;PO intake (%);Meet estimated needs    PO Intake % 85 %    Weight Appropriate weight gain            Nutrition Intervention       12/04/17 1107    Nutrition Intervention    RD/Tech Action Interview for  preference;Encourage intake;Advise alternate selection;Advise available snack;Follow Tx progress;Care plan reviewd            Nutrition Prescription       12/04/17 1107    Nutrition Prescription PO    PO Prescription Begin/change diet   liberalize, d/c renal    Common Modifiers Consistent Carbohydrate    New PO Prescription Ordered? No, recommended    Other Orders    Supplement Vitamin mineral supplement    Supplement Ordered? No, recommended            Education/Evaluation       12/04/17 1108    Education    Education Education topics;Advised regarding habits/behavior    Education Topics Protein;Basic nutrition    Advised Regarding Habits/Behavior Food choices;Snacks;Use supplement    Monitor/Evaluation    Monitor Per protocol    Education Follow-up Reinforce PRN        Electronically signed by:  Precious Escamilla RD  12/04/17 11:08 AM

## 2017-12-04 NOTE — PLAN OF CARE
Problem: Patient Care Overview (Adult)  Goal: Plan of Care Review    12/04/17 1159   Coping/Psychosocial Response Interventions   Plan Of Care Reviewed With patient   Outcome Evaluation   Outcome Summary/Follow up Plan Pt presents from independent living facility with R heel wound; recent history of R hip fracture and repair. Upon exam, pt demonstrates pain, generalized weakness, and impaired gait mechanics limiting mobility. Pt currently requires assist of one with transfers and ambulation; uses rwx at baseline. Pt would benefit from acute PT to address impaiments, increase independence, and assist w/return to PLOF. Anticipate pt will be able to return to independent living facility w/o difficulty; pending progress, pt may also benefit from HH PT at [facility].          Problem: Inpatient Physical Therapy  Goal: Transfer Training Goal 1 LTG- PT    12/04/17 1159   Transfer Training PT LTG   Transfer Training PT LTG, Date Established 12/04/17   Transfer Training PT LTG, Time to Achieve 1 wk   Transfer Training PT LTG, Activity Type all transfers   Transfer Training PT LTG, Cameron Level supervision required   Transfer Training PT LTG, Assist Device walker, rolling       Goal: Gait Training Goal LTG- PT    12/04/17 1159   Gait Training PT LTG   Gait Training Goal PT LTG, Date Established 12/04/17   Gait Training Goal PT LTG, Time to Achieve 1 wk   Gait Training Goal PT LTG, Cameron Level supervision required   Gait Training Goal PT LTG, Assist Device walker, rolling   Gait Training Goal PT LTG, Distance to Achieve 300       Goal: Patient Education Goal LTG- PT    12/04/17 1159   Patient Education PT LTG   Patient Education PT LTG, Date Established 12/04/17   Patient Education PT LTG, Time to Achieve 1 wk   Patient Education PT LTG, Education Type HEP   Patient Education PT LTG, Education Understanding demonstrate adequately

## 2017-12-04 NOTE — PLAN OF CARE
Problem: Patient Care Overview (Adult)  Goal: Plan of Care Review  Outcome: Ongoing (interventions implemented as appropriate)    12/04/17 5426   Coping/Psychosocial Response Interventions   Plan Of Care Reviewed With patient   Patient Care Overview   Progress no change   Outcome Evaluation   Outcome Summary/Follow up Plan WOC done today and they recommend betadine dressing daily. chair alarm/ bed alarm needs to stay on pt at all times. pt sliding scale lowered. continue to monitor.       Goal: Adult Individualization and Mutuality  Outcome: Ongoing (interventions implemented as appropriate)  Goal: Discharge Needs Assessment  Outcome: Ongoing (interventions implemented as appropriate)    Problem: Fall Risk (Adult)  Goal: Identify Related Risk Factors and Signs and Symptoms  Outcome: Ongoing (interventions implemented as appropriate)  Goal: Absence of Falls  Outcome: Ongoing (interventions implemented as appropriate)    Problem: Skin Integrity Impairment, Risk/Actual (Adult)  Goal: Identify Related Risk Factors and Signs and Symptoms  Outcome: Ongoing (interventions implemented as appropriate)    Problem: Infection, Risk/Actual (Adult)  Goal: Identify Related Risk Factors and Signs and Symptoms  Outcome: Ongoing (interventions implemented as appropriate)  Goal: Infection Prevention/Resolution  Outcome: Ongoing (interventions implemented as appropriate)    Problem: Pain, Acute (Adult)  Goal: Identify Related Risk Factors and Signs and Symptoms  Outcome: Ongoing (interventions implemented as appropriate)  Goal: Acceptable Pain Control/Comfort Level  Outcome: Ongoing (interventions implemented as appropriate)

## 2017-12-04 NOTE — PLAN OF CARE
Problem: Patient Care Overview (Adult)  Goal: Plan of Care Review  Outcome: Ongoing (interventions implemented as appropriate)    12/03/17 1720 12/03/17 2015 12/04/17 0418   Coping/Psychosocial Response Interventions   Plan Of Care Reviewed With --  patient;daughter --    Patient Care Overview   Progress no change --  --    Outcome Evaluation   Outcome Summary/Follow up Plan --  --  Patient slept most of the night. No amily at bedside at this time. Black eschar noted on right heel. No discharge or odor noted. Wound nurse to see patient today. Patient ambulates with x1 assist. No complaints at this time. Will continue to monitor vital signs, labs and comfort.       Goal: Adult Individualization and Mutuality  Outcome: Ongoing (interventions implemented as appropriate)  Goal: Discharge Needs Assessment  Outcome: Ongoing (interventions implemented as appropriate)    Problem: Fall Risk (Adult)  Goal: Identify Related Risk Factors and Signs and Symptoms  Outcome: Ongoing (interventions implemented as appropriate)  Goal: Absence of Falls  Outcome: Ongoing (interventions implemented as appropriate)    Problem: Skin Integrity Impairment, Risk/Actual (Adult)  Goal: Identify Related Risk Factors and Signs and Symptoms  Outcome: Ongoing (interventions implemented as appropriate)  Goal: Skin Integrity/Wound Healing  Outcome: Ongoing (interventions implemented as appropriate)    Problem: Infection, Risk/Actual (Adult)  Goal: Identify Related Risk Factors and Signs and Symptoms  Outcome: Ongoing (interventions implemented as appropriate)  Goal: Infection Prevention/Resolution  Outcome: Ongoing (interventions implemented as appropriate)    Problem: Pain, Acute (Adult)  Goal: Identify Related Risk Factors and Signs and Symptoms  Outcome: Ongoing (interventions implemented as appropriate)  Goal: Acceptable Pain Control/Comfort Level  Outcome: Ongoing (interventions implemented as appropriate)

## 2017-12-04 NOTE — PROGRESS NOTES
[facility]     LOS: 2 days     Name: Jada Keith  Age: 89 y.o.  Sex: female  :  4/10/1926  MRN: [id number]         Primary Care Physician: Alicia Leger MD    Diet Regular; Cardiac, Consistent Carbohydrate, Renal    Subjective   States right heel pain is better. Discussed with WOCN    Objective   Temp:  [96.8 °F (36 °C)-97.6 °F (36.4 °C)] 97.6 °F (36.4 °C)  Heart Rate:  [63-76] 63  Resp:  [16] 16  BP: (122-130)/(51-57) 125/52  SpO2:  [96 %-98 %] 98 %  on   ;   O2 Device: room air  Body mass index is 23.04 kg/(m^2).    Physical Exam   Constitutional: No distress.   Cardiovascular: Normal rate and regular rhythm.    Pulmonary/Chest: Effort normal and breath sounds normal. No respiratory distress.   Abdominal: Soft. There is no tenderness. There is no rebound and no guarding.   Musculoskeletal: She exhibits no edema.   Neurological: She is alert.   Skin:   Right heel ulcer. Betadine.     Reviewed medications and new clinical results    aspirin 81 mg Oral Daily   donepezil 10 mg Oral Nightly   glipiZIDE 10 mg Oral Nightly   heparin (porcine) 5,000 Units Subcutaneous Q12H   insulin aspart 0-9 Units Subcutaneous 4x Daily With Meals & Nightly   insulin aspart 0-9 Units Subcutaneous 4x Daily With Meals & Nightly   ivabradine HCl 7.5 mg Oral Daily With Breakfast & Dinner   metoprolol succinate XL 50 mg Oral Daily       sodium chloride 60 mL/hr Last Rate: 60 mL/hr (17 0412)       Results from last 7 days  Lab Units 17  0510 17  0739 17  194   WBC 10*3/mm3 9.10 9.72 10.35   HEMOGLOBIN g/dL 10.5* 11.0* 10.6*   PLATELETS 10*3/mm3 206 249 256       Results from last 7 days  Lab Units 17  0510 17  0739 17  1947   SODIUM mmol/L 143 139 139   POTASSIUM mmol/L 4.0 4.4 4.9   CHLORIDE mmol/L 107 102 98   CO2 mmol/L 24.8 24.5 23.9   BUN mg/dL 40* 56* 71*   CREATININE mg/dL 1.25* 1.54* 2.25*   CALCIUM mg/dL 8.8 9.1 9.4   GLUCOSE  mg/dL 59* 120* 129*     Glucose   Date/Time Value Ref Range Status   12/04/2017 0632 109 70 - 130 mg/dL Final   12/04/2017 0557 66 (L) 70 - 130 mg/dL Final   12/03/2017 2052 191 (H) 70 - 130 mg/dL Final   12/03/2017 1609 108 70 - 130 mg/dL Final   12/03/2017 1125 211 (H) 70 - 130 mg/dL Final   12/03/2017 0639 130 70 - 130 mg/dL Final     Estimated Creatinine Clearance: 25.6 mL/min (by C-G formula based on Cr of 1.25).    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     · MARV improving, EF 22%. Stop IVF. Holding ACE-I, diuretic, metformin. BP stable. If Cr improved will gradually start meds.  · Right heel ulcer. No antibiotics at this time. No systemic signs of sepsis  · A1c was 10.95 last admission. Holding metformin for now. Had hypoglycemia this morning. Will stop glipizide. Switch to low dose SSI.  · Patient wants to go home on discharge instead of back to facility. PT kevon.     Xander Apodaca MD   12/04/17  10:46 AM

## 2017-12-04 NOTE — THERAPY EVALUATION
Acute Care - Physical Therapy Initial Evaluation  [facility]     Patient Name: Jada Keith  : 4/10/1926  MRN: [id number]  Today's Date: 2017   Onset of Illness/Injury or Date of Surgery Date: 17     Referring Physician: Constanza      Admit Date: 2017     Visit Dx:    ICD-10-CM ICD-9-CM   1. Non healing left heel wound S91.302A 892.1   2. Acute nontraumatic kidney injury N17.9 584.9   3. 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   • Chronic combined systolic and diastolic heart failure   • Collapsed vertebra, not elsewhere classified, thoracic region, initial encounter for fracture   • Diastolic dysfunction   • Diffuse non-Hodgkin's lymphoblastic lymphoma   • Diverticulosis of intestine   • Uterine leiomyoma   • Gastroesophageal reflux disease   • Memory loss   • Moderate mitral regurgitation   • Obstructive sleep apnea syndrome   • Osteoporosis with pathological fracture   • Paroxysmal supraventricular tachycardia   • Rectal disorder   • Sinus tachycardia   • Acute systolic (congestive) heart failure   • Acute pulmonary edema   • Pedal edema   • Acute respiratory failure with hypoxemia   • Pleural effusion on right   • Essential hypertension   • Acute kidney injury   • Closed subcapital fracture of right femur   • Leukocytosis   • DM type 2 (diabetes mellitus, type 2), new diagnosis   • Hyponatremia   • DNR (do not resuscitate)   • Heel ulcer, right, with unspecified severity   • DM (diabetes mellitus)     Past Medical History:   Diagnosis Date   • Arthritis    • CHF (congestive heart failure)    • Coronary artery disease    • DM type 2 (diabetes mellitus, type 2), new diagnosis 10/8/2017   • GERD (gastroesophageal reflux disease)    • History of transfusion    • Hypertension    • Lymphoma    • Status post chemotherapy      Past Surgical History:   Procedure Laterality Date    • ABDOMINAL SURGERY      tumor removals due to cancer   • APPENDECTOMY     • BACK SURGERY     • CARDIAC CATHETERIZATION     • CHOLECYSTECTOMY     • EYE SURGERY      cataract surgery   • FRACTURE SURGERY      right hip   • HYSTERECTOMY     • JOINT REPLACEMENT      right hip   • KS PARTIAL HIP REPLACEMENT Right 10/8/2017    Procedure: CEMENTED BIPOLAR;  Surgeon: Michael England MD;  Location: [facility] MAIN OR;  Service: Orthopedics   • SKIN BIOPSY     • TONSILLECTOMY     • TUMOR REMOVAL            PT ASSESSMENT (last 72 hours)      PT Evaluation       12/04/17 1130 12/02/17 2327    Rehab Evaluation    Document Type evaluation  -EE     Subjective Information agree to therapy;complains of;pain  -EE     Patient Effort, Rehab Treatment good  -EE     Symptoms Noted During/After Treatment none  -EE     General Information    Onset of Illness/Injury or Date of Surgery Date 12/02/17  -EE     Referring Physician Constanza  -EE     General Observations Pt supine in bed in no acute distress. Dressing applied to R heel wound.  -EE     Pertinent History Of Current Problem Admitted w/non healing ulcer on R heel. Recent R hip fx repair, went to SNF for PREM and recently discharged home.   -EE     Precautions/Limitations fall precautions  -EE     Prior Level of Function independent:;all household mobility;community mobility  -EE     Equipment Currently Used at Home walker, rolling;raised toilet;shower chair  -EE     Plans/Goals Discussed With patient;agreed upon  -EE     Barriers to Rehab none identified  -EE     Living Environment    Lives With alone  -EE alone  -JR    Living Arrangements independent living facility  -EE independent living facility  -JR    Home Accessibility no concerns  -EE bed and bath on same level;grab bars present (bathtub);grab bars present (toilet)  -JR    Stair Railings at Home  none  -JR    Type of Financial/Environmental Concern  none  -JR    Transportation Available  none  -JR    Living Environment Comment   n/a  -JR    Clinical Impression    Patient/Family Goals Statement Go home  -EE     Criteria for Skilled Therapeutic Interventions Met yes;treatment indicated  -EE     Pathology/Pathophysiology Noted (Describe Specifically for Each System) musculoskeletal  -EE     Rehab Potential good, to achieve stated therapy goals  -EE     Pain Assessment    Pain Assessment 0-10  -EE     Pain Score 4  -EE     Pain Type Acute pain  -EE     Pain Location Foot   heel  -EE     Pain Orientation Right  -EE     Pain Intervention(s) Repositioned;Rest  -EE     Response to Interventions tolerated  -EE     Cognitive Assessment/Intervention    Current Cognitive/Communication Assessment functional  -EE     Orientation Status oriented x 4  -EE     Follows Commands/Answers Questions 100% of the time  -EE     Personal Safety WNL/WFL  -EE     Personal Safety Interventions gait belt;fall prevention program maintained;muscle strengthening facilitated;nonskid shoes/slippers when out of bed;supervised activity  -EE     ROM (Range of Motion)    General ROM no range of motion deficits identified  -EE     MMT (Manual Muscle Testing)    General MMT Assessment lower extremity strength deficits identified;other (see comments)  -EE     General MMT Assessment Detail B LEs grossly 3+ to 4-/5  -EE     Mobility Assessment/Training    Extremity Weight-Bearing Status right lower extremity  -EE     Right Lower Extremity Weight-Bearing weight-bearing as tolerated  -EE     Bed Mobility, Assessment/Treatment    Bed Mob, Supine to Sit, Hodgeman independent  -EE     Bed Mob, Sit to Supine, Hodgeman independent  -EE     Transfer Assessment/Treatment    Transfers, Sit-Stand Hodgeman contact guard assist  -EE     Transfers, Stand-Sit Hodgeman contact guard assist  -EE     Transfers, Sit-Stand-Sit, Assist Device rolling walker  -EE     Gait Assessment/Treatment    Gait, Hodgeman Level contact guard assist  -EE     Gait, Assistive Device rolling walker   -EE     Gait, Distance (Feet) 175  -EE     Gait, Gait Deviations kaveh decreased;right:;antalgic;decreased heel strike   unable to achieve R heel strike due to ulcer  -EE     Gait, Safety Issues step length decreased;weight-shifting ability decreased  -EE     Gait, Impairments strength decreased;pain  -EE     Gait, Comment Pt unable to tolerate R heel strike due to pain from wound; ambulating only on R toes and forefoot  -EE     Motor Skills/Interventions    Additional Documentation Balance Skills Training (Group)  -EE     Balance Skills Training    Sitting-Level of Assistance Independent  -EE     Standing-Level of Assistance Close supervision  -EE     Static Standing Balance Support assistive device  -EE     Gait Balance-Level of Assistance Contact guard  -EE     Gait Balance Support assistive device  -EE     Positioning and Restraints    Pre-Treatment Position in bed  -EE     Post Treatment Position chair  -EE     In Chair sitting;call light within reach;encouraged to call for assist;notified nsg  -       12/02/17 2223       General Information    Equipment Currently Used at Home dressing device;grab bar;raised toilet;shower chair;walker, standard  -JR       User Key  (r) = Recorded By, (t) = Taken By, (c) = Cosigned By    Initials Name Provider Type    EE Meche Linares PT Physical Therapist    JR Tutu Hampton RN Registered Nurse          Physical Therapy Education     Title: PT OT SLP Therapies (Active)     Topic: Physical Therapy (Active)     Point: Mobility training (Done)    Learning Progress Summary    Learner Readiness Method Response Comment Documented by Status   Patient Acceptance E,TB VU,NR   12/04/17 1159 Done                      User Key     Initials Effective Dates Name Provider Type Discipline     12/01/15 -  Meche Linares PT Physical Therapist PT                PT Recommendation and Plan  Anticipated Discharge Disposition: home, home with home health (may benefit from  PT pending  progress)  Planned Therapy Interventions: balance training, bed mobility training, gait training, home exercise program, patient/family education, strengthening, transfer training  PT Frequency: daily  Plan of Care Review  Plan Of Care Reviewed With: patient  Outcome Summary/Follow up Plan: Pt presents from independent living facility with R heel wound; recent history of R hip fracture and repair. Upon exam, pt demonstrates pain, generalized weakness, and impaired gait mechanics limiting mobility. Pt currently requires assist of one with transfers and ambulation; uses rwx at baseline. Pt would benefit from acute PT to address impaiments, increase independence, and assist w/return to PLOF.  Anticipate pt will be able to return to independent living facility w/o difficulty; pending progress, pt may also benefit from  PT at [place].           IP PT Goals       12/04/17 1159          Transfer Training PT LTG    Transfer Training PT LTG, Date Established 12/04/17  -EE      Transfer Training PT LTG, Time to Achieve 1 wk  -EE      Transfer Training PT LTG, Activity Type all transfers  -EE      Transfer Training PT LTG, [place] Level supervision required  -EE      Transfer Training PT LTG, Assist Device walker, rolling  -EE      Gait Training PT LTG    Gait Training Goal PT LTG, Date Established 12/04/17  -EE      Gait Training Goal PT LTG, Time to Achieve 1 wk  -EE      Gait Training Goal PT LTG, [place] Level supervision required  -EE      Gait Training Goal PT LTG, Assist Device walker, rolling  -EE      Gait Training Goal PT LTG, Distance to Achieve 300  -EE      Patient Education PT LTG    Patient Education PT LTG, Date Established 12/04/17  -EE      Patient Education PT LTG, Time to Achieve 1 wk  -EE      Patient Education PT LTG, Education Type HEP  -EE      Patient Education PT LTG, Education Understanding demonstrate adequately  -EE        User Key  (r) = Recorded By, (t) = Taken By, (c) = Cosigned By     Initials Name Provider Type    EE Meche Linares PT Physical Therapist                Outcome Measures       12/04/17 1200          How much help from another person do you currently need...    Turning from your back to your side while in flat bed without using bedrails? 4  -EE      Moving from lying on back to sitting on the side of a flat bed without bedrails? 4  -EE      Moving to and from a bed to a chair (including a wheelchair)? 3  -EE      Standing up from a chair using your arms (e.g., wheelchair, bedside chair)? 3  -EE      Climbing 3-5 steps with a railing? 2  -EE      To walk in hospital room? 3  -EE      AM-PAC 6 Clicks Score 19  -EE      Functional Assessment    Outcome Measure Options AM-PAC 6 Clicks Basic Mobility (PT)  -EE        User Key  (r) = Recorded By, (t) = Taken By, (c) = Cosigned By    Initials Name Provider Type    EE Meche Linares PT Physical Therapist           Time Calculation:         PT Charges       12/04/17 1201          Time Calculation    Start Time 1130  -EE      Stop Time 1142  -EE      Time Calculation (min) 12 min  -EE      PT Received On 12/04/17  -EE      PT - Next Appointment 12/05/17  -EE      PT Goal Re-Cert Due Date 12/11/17  -EE        User Key  (r) = Recorded By, (t) = Taken By, (c) = Cosigned By    Initials Name Provider Type    STEPHAN Linares PT Physical Therapist          Therapy Charges for Today     Code Description Service Date Service Provider Modifiers Qty    86538855851 HC PT EVAL MOD COMPLEXITY 2 12/4/2017 Meche Linares, PT GP 1    96714166850 HC PT THER SUPP EA 15 MIN 12/4/2017 Meche Linares PT GP 1          PT G-Codes  Outcome Measure Options: AM-PAC 6 Clicks Basic Mobility (PT)      Meche Linares PT  12/4/2017

## 2017-12-04 NOTE — CONSULTS
Malnutrition Severity Assessment    Patient Name:  Jada Keith  YOB: 1926  MRN: [id number]  Admit Date:  12/2/2017    Patient meets criteria for : Moderate malnutrition    Comments:  Pt meets criteria for moderate malnutrition of acute illness in areas of physical assessment, weight status and energy intake as noted below. She has lost approximately 15 lb (10%) since her hip surgery in November and is 89% of her UBW. She reports little appetite or intake since this surgery. She acquired a R heel pressure ulcer and also has physical signs of muscle loss in her temporal, pectoral and calf areas.     See nutrition assessment in separate note for plan of care. Pt educated on importance of nutritional intake, and protein especially, which will need to be reinforced with her.     Malnutrition Type: Acute Illness/Injury Malnutrition     Malnutrition Type (last 8 hours)      Malnutrition Severity Assessment       12/04/17 1055    Malnutrition Severity Assessment    Malnutrition Type Acute Illness/Injury Malnutrition      12/04/17 1055    Physical Signs of Malnutrition (Acute)    Muscle Wasting Mild   mild temporal hollowing; visible clavicle (lack of pectoral muscle); bilat LE lacks tone in calf    Secondary Physical Signs Present (comment)   pressure ulcer on R heel (unstageable)      12/04/17 1055    Weight Status (Acute)    %UBW Mild (86-90%)    Weight Loss Severe (>5% / 1 mo)      12/04/17 1055    Energy Intake Status (Acute)    Energy Intake Mod (<75% / >7d)      12/04/17 1055    Criteria Met (Must meet criteria for severity in at least 2 of these categories: M Wasting, Fat Loss, Fluid, Secondary Signs, Wt. Status, Intake)    Patient meets criteria for  Moderate malnutrition          Electronically signed by:  Precious Escamilla RD  12/04/17 11:00 AM

## 2017-12-05 ENCOUNTER — APPOINTMENT (OUTPATIENT)
Dept: MRI IMAGING | Facility: HOSPITAL | Age: 82
End: 2017-12-05

## 2017-12-05 LAB
ANION GAP SERPL CALCULATED.3IONS-SCNC: 5.6 MMOL/L
BUN BLD-MCNC: 28 MG/DL (ref 8–23)
BUN/CREAT SERPL: 29.8 (ref 7–25)
CALCIUM SPEC-SCNC: 9 MG/DL (ref 8.2–9.6)
CHLORIDE SERPL-SCNC: 104 MMOL/L (ref 98–107)
CO2 SERPL-SCNC: 29.4 MMOL/L (ref 22–29)
CREAT BLD-MCNC: 0.94 MG/DL (ref 0.57–1)
ERYTHROCYTE [SEDIMENTATION RATE] IN BLOOD: 44 MM/HR (ref 0–30)
GFR SERPL CREATININE-BSD FRML MDRD: 56 ML/MIN/1.73
GLUCOSE BLD-MCNC: 127 MG/DL (ref 65–99)
GLUCOSE BLDC GLUCOMTR-MCNC: 129 MG/DL (ref 70–130)
GLUCOSE BLDC GLUCOMTR-MCNC: 141 MG/DL (ref 70–130)
GLUCOSE BLDC GLUCOMTR-MCNC: 144 MG/DL (ref 70–130)
GLUCOSE BLDC GLUCOMTR-MCNC: 151 MG/DL (ref 70–130)
POTASSIUM BLD-SCNC: 4.2 MMOL/L (ref 3.5–5.2)
SODIUM BLD-SCNC: 139 MMOL/L (ref 136–145)

## 2017-12-05 PROCEDURE — 82962 GLUCOSE BLOOD TEST: CPT

## 2017-12-05 PROCEDURE — 0 GADOBENATE DIMEGLUMINE 529 MG/ML SOLUTION: Performed by: HOSPITALIST

## 2017-12-05 PROCEDURE — 63710000001 INSULIN ASPART PER 5 UNITS: Performed by: HOSPITALIST

## 2017-12-05 PROCEDURE — 73720 MRI LWR EXTREMITY W/O&W/DYE: CPT

## 2017-12-05 PROCEDURE — 25010000002 HEPARIN (PORCINE) PER 1000 UNITS: Performed by: INTERNAL MEDICINE

## 2017-12-05 PROCEDURE — A9577 INJ MULTIHANCE: HCPCS | Performed by: HOSPITALIST

## 2017-12-05 PROCEDURE — 80048 BASIC METABOLIC PNL TOTAL CA: CPT | Performed by: HOSPITALIST

## 2017-12-05 PROCEDURE — 85652 RBC SED RATE AUTOMATED: CPT | Performed by: HOSPITALIST

## 2017-12-05 RX ADMIN — HEPARIN SODIUM 5000 UNITS: 5000 INJECTION, SOLUTION INTRAVENOUS; SUBCUTANEOUS at 21:24

## 2017-12-05 RX ADMIN — METOPROLOL SUCCINATE 50 MG: 50 TABLET, FILM COATED, EXTENDED RELEASE ORAL at 08:41

## 2017-12-05 RX ADMIN — DONEPEZIL HYDROCHLORIDE 10 MG: 10 TABLET, FILM COATED ORAL at 21:24

## 2017-12-05 RX ADMIN — ASPIRIN 81 MG: 81 TABLET ORAL at 08:41

## 2017-12-05 RX ADMIN — GADOBENATE DIMEGLUMINE 10 ML: 529 INJECTION, SOLUTION INTRAVENOUS at 16:09

## 2017-12-05 RX ADMIN — HEPARIN SODIUM 5000 UNITS: 5000 INJECTION, SOLUTION INTRAVENOUS; SUBCUTANEOUS at 08:42

## 2017-12-05 RX ADMIN — INSULIN ASPART 2 UNITS: 100 INJECTION, SOLUTION INTRAVENOUS; SUBCUTANEOUS at 18:21

## 2017-12-05 NOTE — PROGRESS NOTES
[facility]     LOS: 3 days     Name: Jada Keith  Age: 89 y.o.  Sex: female  :  4/10/1926  MRN: [id number]         Primary Care Physician: Alicia Leger MD    Diet Regular; Cardiac, Consistent Carbohydrate, Renal    Subjective   No new complaints. Just got back from MRI    Objective   Temp:  [97 °F (36.1 °C)-98.7 °F (37.1 °C)] 98 °F (36.7 °C)  Heart Rate:  [67-88] 67  Resp:  [16-20] 20  BP: (102-128)/(48-70) 102/48  SpO2:  [95 %-98 %] 97 %  on   ;   O2 Device: room air  Body mass index is 23.04 kg/(m^2).    Physical Exam   Constitutional: No distress.   Cardiovascular: Normal rate and regular rhythm.    Pulmonary/Chest: Effort normal and breath sounds normal. No respiratory distress.   Abdominal: Soft. There is no tenderness. There is no rebound and no guarding.   Musculoskeletal: She exhibits no edema.   Neurological: She is alert.     Reviewed medications and new clinical results    aspirin 81 mg Oral Daily   donepezil 10 mg Oral Nightly   heparin (porcine) 5,000 Units Subcutaneous Q12H   insulin aspart 0-7 Units Subcutaneous 4x Daily With Meals & Nightly   ivabradine HCl 7.5 mg Oral Daily With Breakfast & Dinner   metoprolol succinate XL 50 mg Oral Daily          Results from last 7 days  Lab Units 17  0510 17  0739 17   WBC 10*3/mm3 9.10 9.72 10.35   HEMOGLOBIN g/dL 10.5* 11.0* 10.6*   PLATELETS 10*3/mm3 206 249 256       Results from last 7 days  Lab Units 17  0550 17  0510 17  0739 17   SODIUM mmol/L 139 143 139 139   POTASSIUM mmol/L 4.2 4.0 4.4 4.9   CHLORIDE mmol/L 104 107 102 98   CO2 mmol/L 29.4* 24.8 24.5 23.9   BUN mg/dL 28* 40* 56* 71*   CREATININE mg/dL 0.94 1.25* 1.54* 2.25*   CALCIUM mg/dL 9.0 8.8 9.1 9.4   GLUCOSE mg/dL 127* 59* 120* 129*     Glucose   Date/Time Value Ref Range Status   2017 1115 141 (H) 70 - 130 mg/dL Final   2017 0637 129 70 - 130 mg/dL Final    12/04/2017 2209 168 (H) 70 - 130 mg/dL Final   12/04/2017 1622 165 (H) 70 - 130 mg/dL Final   12/04/2017 1128 186 (H) 70 - 130 mg/dL Final   12/04/2017 0632 109 70 - 130 mg/dL Final   12/04/2017 0557 66 (L) 70 - 130 mg/dL Final   12/03/2017 2052 191 (H) 70 - 130 mg/dL Final     Estimated Creatinine Clearance: 34 mL/min (by C-G formula based on Cr of 0.94).    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     · MARV resolved. EF 22%. Stop IVF. Held ACE-I, diuretic, metformin. BP stable though a little low. Will monitor and likely need to restart meds.  · Right heel ulcer. MRI pending. Discussed with Dr. Levy  · A1c was 10.95 last admission. Holding metformin for now. No more hypoglycemia  · Possible discharge tomorrow.    Xander Apodaca MD   12/05/17  4:27 PM

## 2017-12-05 NOTE — SIGNIFICANT NOTE
12/05/17 1410   Rehab Treatment   Discipline physical therapist   Treatment Not Performed patient/family declined treatment  (Pt up in chair with nsg assistant present. States she has already been up several times today and doesn't feel up to walking again due to heel pain. Discussed w/RN, Viky; will follow up tomorrow. )   Recommendation   PT - Next Appointment 12/06/17

## 2017-12-05 NOTE — PROGRESS NOTES
Discharge Planning Assessment  [facility]     Patient Name: Jada Keith  MRN: [id number]  Today's Date: 12/5/2017    Admit Date: 12/2/2017          Discharge Needs Assessment       12/05/17 1756    Living Environment    Lives With alone    Living Arrangements apartment    Home Accessibility no concerns;bed and bath on same level;grab bars present (toilet);grab bars present (bathtub)    Stair Railings at Home none    Type of Financial/Environmental Concern none    Transportation Available car;family or friend will provide    Living Environment    Quality Of Family Relationships supportive    Able to Return to Prior Living Arrangements yes    Discharge Needs Assessment    Concerns To Be Addressed no discharge needs identified;denies needs/concerns at this time    Readmission Within The Last 30 Days no previous admission in last 30 days    Equipment Currently Used at Home walker, standard;raised toilet;shower chair            Discharge Plan       12/05/17 1756    Case Management/Social Work Plan    Plan Home with no needs     Patient/Family In Agreement With Plan yes    Additional Comments CCP met with patient at bedside. Face sheet verified. CCP role explained and discharge planning discussed. IMM noted. HH/SNF list left at bedside. Patient lives alone. Patient's daughter Berkley Grullon ([phone]) assists her with doctor's appointments and grocery shopping. Patient is independent with her ADLS and uses a rolling walker. Patient uses [facility] on [address] Patient denies having trouble affording her medications and denies having trouble remembering to take her medications. Patient has shower chair and raised toilet. Patient has not used HH/SNF. Patient does have transportation home. Patient denies any discharge needs at this time. CCP will follow for discharge home and assist with any needs that may arise. Nalini Sky W         Discharge Placement     No information found                Demographic  Summary       12/05/17 1754    Referral Information    Admission Type inpatient    Arrived From admitted as an inpatient    Referral Source admission list    Record Reviewed history and physical;patient profile    Primary Care Physician Information    Name Alicia Leger             Functional Status       12/05/17 1755    Functional Status Current    Ambulation 3-->assistive equipment and person    Transferring 3-->assistive equipment and person    Toileting 3-->assistive equipment and person    Bathing 3-->assistive equipment and person    Dressing 2-->assistive person    Eating 0-->independent    Communication 0-->understands/communicates without difficulty    Functional Status Prior    Ambulation 1-->assistive equipment    Transferring 1-->assistive equipment    Toileting 1-->assistive equipment    Bathing 1-->assistive equipment    Dressing 1-->assistive equipment    Eating 0-->independent    Communication 0-->understands/communicates without difficulty    Swallowing 0-->swallows foods/liquids without difficulty    IADL    Medications independent    Meal Preparation independent    Housekeeping independent    Laundry independent    Shopping independent    Oral Care independent    Cognitive/Perceptual/Developmental    Current Mental Status/Cognitive Functioning no deficits noted    Employment/Financial    Financial Concerns none            Psychosocial     None            Abuse/Neglect     None            Legal     None            Substance Abuse     None            Patient Forms       12/05/17 1755    Patient Forms    Provider Choice List Delivered    Delivered to Patient    Method of delivery In person          TANGELA Waddell

## 2017-12-05 NOTE — CONSULTS
Spiritual care consult: patient requested communion.   visited patient and confirmed received communion today.  No other needs expressed.

## 2017-12-05 NOTE — PLAN OF CARE
Problem: Patient Care Overview (Adult)  Goal: Plan of Care Review  Outcome: Ongoing (interventions implemented as appropriate)    12/04/17 1652 12/04/17 2005 12/05/17 0450   Coping/Psychosocial Response Interventions   Plan Of Care Reviewed With --  patient --    Patient Care Overview   Progress no change --  --    Outcome Evaluation   Outcome Summary/Follow up Plan --  --  Patient slept all night. No family at bedside at this time. Dressing of affected heel to be done daily. Patient is on bed alarm. Will continue to monitor vital signs, labs and comfort.       Goal: Adult Individualization and Mutuality  Outcome: Ongoing (interventions implemented as appropriate)  Goal: Discharge Needs Assessment  Outcome: Ongoing (interventions implemented as appropriate)    Problem: Fall Risk (Adult)  Goal: Identify Related Risk Factors and Signs and Symptoms  Outcome: Ongoing (interventions implemented as appropriate)  Goal: Absence of Falls  Outcome: Ongoing (interventions implemented as appropriate)    Problem: Skin Integrity Impairment, Risk/Actual (Adult)  Goal: Identify Related Risk Factors and Signs and Symptoms  Outcome: Ongoing (interventions implemented as appropriate)  Goal: Skin Integrity/Wound Healing  Outcome: Ongoing (interventions implemented as appropriate)    Problem: Infection, Risk/Actual (Adult)  Goal: Identify Related Risk Factors and Signs and Symptoms  Outcome: Ongoing (interventions implemented as appropriate)  Goal: Infection Prevention/Resolution  Outcome: Ongoing (interventions implemented as appropriate)    Problem: Pain, Acute (Adult)  Goal: Identify Related Risk Factors and Signs and Symptoms  Outcome: Ongoing (interventions implemented as appropriate)  Goal: Acceptable Pain Control/Comfort Level  Outcome: Ongoing (interventions implemented as appropriate)

## 2017-12-05 NOTE — DISCHARGE PLACEMENT REQUEST
"Jada Keith (89 y.o. Female)     Date of Birth Social Security Number Address Home Phone MRN    04/10/1926  [address] [address] [phone] [id number]    Druze Marital Status          Mandaen        Admission Date Admission Type Admitting Provider Attending Provider Department, Room/Bed    12/2/17 Emergency Alejandra Levy MD Nguyen, Xander Sanderson MD [address], P483/1    Discharge Date Discharge Disposition Discharge Destination                      Attending Provider: Xander Apodaca MD     Allergies:  Sertraline, Risedronate Sodium, Sulfa Antibiotics    Isolation:  None   Infection:  None   Code Status:  Conditional    Ht:  154.9 cm (61\")   Wt:  55.3 kg (121 lb 14.6 oz)    Admission Cmt:  None   Principal Problem:  Heel ulcer, right, with unspecified severity [L97.419]                 Active Insurance as of 12/2/2017     Primary Coverage     Payor Plan Insurance Group Employer/Plan Group    MEDICARE MEDICARE A & B      Payor Plan Address Payor Plan Phone Number Effective From Effective To    [address] [phone] 4/1/1991     [place] [address]       Subscriber Name Subscriber Birth Date Member ID       JADA KEITH 4/10/1926 [id number]           Secondary Coverage     Payor Plan Insurance Group Employer/Plan Group    AARP MED SUPP AAR HEALTH CARE OPTIONS PLAN N     Payor Plan Address Payor Plan Phone Number Effective From Effective To    [facility] [phone] 1/1/2013     [address]       [place] [address]       Subscriber Name Subscriber Birth Date Member ID       JADA KEITH 4/10/1926 [id number]                 Emergency Contacts      (Rel.) Home Phone Work Phone Mobile Phone    Berkley Grullon (Daughter) [phone] -- [phone]              "

## 2017-12-05 NOTE — PLAN OF CARE
Problem: Patient Care Overview (Adult)  Goal: Plan of Care Review  Outcome: Ongoing (interventions implemented as appropriate)    12/05/17 9049   Patient Care Overview   Progress improving   Outcome Evaluation   Outcome Summary/Follow up Plan pt had MRI to rule out osteomylitis. bed alarm on pt at all times. will do dressing change. continue to monitor,       Goal: Adult Individualization and Mutuality  Outcome: Ongoing (interventions implemented as appropriate)  Goal: Discharge Needs Assessment  Outcome: Ongoing (interventions implemented as appropriate)    Problem: Fall Risk (Adult)  Goal: Identify Related Risk Factors and Signs and Symptoms  Outcome: Ongoing (interventions implemented as appropriate)  Goal: Absence of Falls  Outcome: Ongoing (interventions implemented as appropriate)    Problem: Skin Integrity Impairment, Risk/Actual (Adult)  Goal: Identify Related Risk Factors and Signs and Symptoms  Outcome: Ongoing (interventions implemented as appropriate)    Problem: Infection, Risk/Actual (Adult)  Goal: Identify Related Risk Factors and Signs and Symptoms  Outcome: Ongoing (interventions implemented as appropriate)  Goal: Infection Prevention/Resolution  Outcome: Ongoing (interventions implemented as appropriate)    Problem: Pain, Acute (Adult)  Goal: Identify Related Risk Factors and Signs and Symptoms  Outcome: Ongoing (interventions implemented as appropriate)  Goal: Acceptable Pain Control/Comfort Level  Outcome: Ongoing (interventions implemented as appropriate)

## 2017-12-06 VITALS
HEART RATE: 75 BPM | RESPIRATION RATE: 18 BRPM | BODY MASS INDEX: 23.02 KG/M2 | HEIGHT: 61 IN | WEIGHT: 121.91 LBS | TEMPERATURE: 98 F | DIASTOLIC BLOOD PRESSURE: 64 MMHG | OXYGEN SATURATION: 98 % | SYSTOLIC BLOOD PRESSURE: 114 MMHG

## 2017-12-06 LAB
GLUCOSE BLDC GLUCOMTR-MCNC: 123 MG/DL (ref 70–130)
GLUCOSE BLDC GLUCOMTR-MCNC: 138 MG/DL (ref 70–130)
GLUCOSE BLDC GLUCOMTR-MCNC: 165 MG/DL (ref 70–130)

## 2017-12-06 PROCEDURE — 63710000001 INSULIN ASPART PER 5 UNITS: Performed by: HOSPITALIST

## 2017-12-06 PROCEDURE — 25010000002 HEPARIN (PORCINE) PER 1000 UNITS: Performed by: INTERNAL MEDICINE

## 2017-12-06 PROCEDURE — 82962 GLUCOSE BLOOD TEST: CPT

## 2017-12-06 RX ORDER — METOPROLOL SUCCINATE 100 MG/1
50 TABLET, EXTENDED RELEASE ORAL DAILY
Start: 2017-12-06 | End: 2021-11-20 | Stop reason: HOSPADM

## 2017-12-06 RX ORDER — FUROSEMIDE 40 MG/1
20 TABLET ORAL DAILY
Status: ON HOLD
Start: 2017-12-06 | End: 2021-11-18

## 2017-12-06 RX ORDER — SPIRONOLACTONE 25 MG/1
25 TABLET ORAL DAILY
Status: ON HOLD
Start: 2017-12-06 | End: 2021-11-18

## 2017-12-06 RX ORDER — DIGOXIN 125 MCG
125 TABLET ORAL
Start: 2017-12-06

## 2017-12-06 RX ORDER — ENALAPRIL MALEATE 2.5 MG/1
20 TABLET ORAL DAILY
Status: ON HOLD
Start: 2017-12-06 | End: 2021-11-18

## 2017-12-06 RX ADMIN — ASPIRIN 81 MG: 81 TABLET ORAL at 09:09

## 2017-12-06 RX ADMIN — INSULIN ASPART 2 UNITS: 100 INJECTION, SOLUTION INTRAVENOUS; SUBCUTANEOUS at 13:40

## 2017-12-06 RX ADMIN — HEPARIN SODIUM 5000 UNITS: 5000 INJECTION, SOLUTION INTRAVENOUS; SUBCUTANEOUS at 09:10

## 2017-12-06 NOTE — DISCHARGE PLACEMENT REQUEST
"Jada Keith (89 y.o. Female)     Date of Birth Social Security Number Address Home Phone MRN    04/10/1926  [address] [address] [phone] [id number]    Congregation Marital Status          Christian        Admission Date Admission Type Admitting Provider Attending Provider Department, Room/Bed    12/2/17 Emergency Alejandra Levy MD Nguyen, Xander Sanderson MD [address], P483/1    Discharge Date Discharge Disposition Discharge Destination                      Attending Provider: Xander Apodaca MD     Allergies:  Sertraline, Risedronate Sodium, Sulfa Antibiotics    Isolation:  None   Infection:  None   Code Status:  Conditional    Ht:  154.9 cm (61\")   Wt:  55.3 kg (121 lb 14.6 oz)    Admission Cmt:  None   Principal Problem:  Heel ulcer, right, with unspecified severity [L97.419]                 Active Insurance as of 12/2/2017     Primary Coverage     Payor Plan Insurance Group Employer/Plan Group    MEDICARE MEDICARE A & B      Payor Plan Address Payor Plan Phone Number Effective From Effective To    [address] [phone] 4/1/1991     [place] [address]       Subscriber Name Subscriber Birth Date Member ID       JADA KEITH 4/10/1926 [id number]           Secondary Coverage     Payor Plan Insurance Group Employer/Plan Group    AARP MED SUPP AAR HEALTH CARE OPTIONS PLAN N     Payor Plan Address Payor Plan Phone Number Effective From Effective To    [facility] [phone] 1/1/2013     [address]       [place] [address]       Subscriber Name Subscriber Birth Date Member ID       JADA KEITH 4/10/1926 [id number]                 Emergency Contacts      (Rel.) Home Phone Work Phone Mobile Phone    Berkley Grullon (Daughter) [phone] -- [phone]              "

## 2017-12-06 NOTE — PROGRESS NOTES
"  Infectious Diseases Progress Note    Alejandra Levy MD     [facility]  Los: 4 days  Patient Identification:  Name: Jada Keith  Age: 89 y.o.  Sex: female  :  4/10/1926  MRN: [id number]         Primary Care Physician: Alicia Leger MD            Subjective: Better denies any specific complaints.  Occasionally right foot hurts especially when she tries to extend it.  Interval History: See initial H&P and consultation note     Objective:    Scheduled Meds:  aspirin 81 mg Oral Daily   donepezil 10 mg Oral Nightly   heparin (porcine) 5,000 Units Subcutaneous Q12H   insulin aspart 0-7 Units Subcutaneous 4x Daily With Meals & Nightly   ivabradine HCl 7.5 mg Oral Daily With Breakfast & Dinner   metoprolol succinate XL 50 mg Oral Daily     Continuous Infusions:     Vital signs in last 24 hours:  Temp:  [98 °F (36.7 °C)-98.6 °F (37 °C)] 98 °F (36.7 °C)  Heart Rate:  [61-67] 66  Resp:  [16-20] 16  BP: (100-104)/(48-62) 104/62    Intake/Output:    Intake/Output Summary (Last 24 hours) at 17 1213  Last data filed at 17 0843   Gross per 24 hour   Intake             1180 ml   Output                0 ml   Net             1180 ml       Exam:  /62 (BP Location: Right arm, Patient Position: Lying)  Pulse 66  Temp 98 °F (36.7 °C) (Oral)   Resp 16  Ht 154.9 cm (61\")  Wt 55.3 kg (121 lb 14.6 oz)  SpO2 98%  BMI 23.04 kg/m2    General Appearance:    Alert, cooperative, no distress, AAOx3                          Head:    Normocephalic, without obvious abnormality, atraumatic                           Eyes:    PERRL, conjunctiva/corneas clear, EOM's intact, both eyes                         Throat:   Lips, tongue, gums normal; oral mucosa pink and moist                           Neck:   Supple, symmetrical, trachea midline, no JVD                         Lungs:    Clear to auscultation bilaterally, respirations unlabored                 Chest Wall:    No tenderness or deformity                  "         Heart:    Regular rate and rhythm, S1 and S2 normal, no murmur,no  Rub                                      or gallop                  Abdomen:     Soft, non-tender, bowel sounds active, no masses, no                                                        organomegaly                  Extremities:   Right heel decubitus ulceration is dressed with no surrounding cellulitis no obvious soft tissue tenderness noted wound itself is clean and dressed with Betadine.                        Pulses:   Pulses palpable in all extremities                            Skin:   Skin is warm and dry,  no rashes or palpable lesions                      Data Review:    I reviewed the patient's new clinical results.    Results from last 7 days  Lab Units 12/04/17  0510 12/03/17  0739 12/02/17  1947   WBC 10*3/mm3 9.10 9.72 10.35   HEMOGLOBIN g/dL 10.5* 11.0* 10.6*   PLATELETS 10*3/mm3 206 249 256       Results from last 7 days  Lab Units 12/05/17  0550 12/04/17  0510 12/03/17  0739 12/02/17  1947   SODIUM mmol/L 139 143 139 139   POTASSIUM mmol/L 4.2 4.0 4.4 4.9   CHLORIDE mmol/L 104 107 102 98   CO2 mmol/L 29.4* 24.8 24.5 23.9   BUN mg/dL 28* 40* 56* 71*   CREATININE mg/dL 0.94 1.25* 1.54* 2.25*   CALCIUM mg/dL 9.0 8.8 9.1 9.4   GLUCOSE mg/dL 127* 59* 120* 129*     Microbiology Results (last 10 days)     ** No results found for the last 240 hours. **      MRI reviewed - no evidence of osteomyelitis.      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  Also educated about the mechanism of DeCube ulceration and strategies to prevent these.  Despite the fact that MRI is talking about cellulitis clinically there is no  evidence of cellulitis as there is lack of erythema around the right heel decubitus wound.    Alejandra Levy MD  12/6/2017  12:13 PM    Much of this encounter note is an electronic transcription/translation of spoken language to printed text. The electronic translation of spoken language may permit erroneous, or at times, nonsensical words or phrases to be inadvertently transcribed; Although I have reviewed the note for such errors, some may still exist

## 2017-12-06 NOTE — PROGRESS NOTES
Case Management Discharge Note    Final Note: [facility] skilled bed, by family auto. GARY Del Real, RN, CCP.     Discharge Placement     Facility/Agency Request Status Selected? Address Phone Number Fax Number    [facility] Accepted    Yes [address] [facility] [address] [phone]        Celi Del Real, JOSIE 12/6/2017 11:56    Spoke with [facility] And she states the patient is from independent living and they can accept back and she will follow for rehab if the patient needs it. GARY Del Real               [facility] Accepted     [address] [address] [phone]        Other: Other (daughter private auto)    Discharge Codes: 03  Discharged/transferred to skilled nursing facility (SNF) with Medicare certification in anticipation of skilled care

## 2017-12-06 NOTE — SIGNIFICANT NOTE
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 Notified JOSIE Aguilar. PT will check back in pm.)   Recommendation   PT - Next Appointment 12/06/17

## 2017-12-06 NOTE — PROGRESS NOTES
Discharge Planning Assessment  [facility]     Patient Name: Jada Keith  MRN: [id number]  Today's Date: 12/6/2017    Admit Date: 12/2/2017          Discharge Needs Assessment     None            Discharge Plan       12/06/17 1328    Case Management/Social Work Plan    Additional Comments Spoke with the patient's daughter and she states the discharge plan is for her mother to go to [facility] bed[facility] by auto today. Spoke with Dr. Apodaca and he states the discharge plan is today to rehab. Spoke with [facility] and they can accept to a semi-private bed. The daughter is in agreement with the discharge plan. GARY Del Real RN, CCP.     Final Note    Final Note [facility], [facility] bed, by family auto. GARY Del eRal RN, CCP.       12/06/17 1112    Case Management/Social Work Plan    Plan Home with [facility]     Additional Comments Late entry from 12/5/17: CCP met with patient and discussed PT recommendations for home health. Patient would like [facility] to contact her daughter, Berkley Grullon ([phone]). Patient's daughter stated patient can be confused at times. Patient is current with [facility], referral made to [facility]. Per patient's daughter, patient lives alone in independent living but has [facility] come in 7 days a week  at 9 in the morning and 9 at night and for lunch time. Patient's daughter would like [facility] to call her for discharge planning. CCP will follow for discharge home with [facility]. Nalini Sky [facility]         Discharge Placement     Facility/Agency Request Status Selected? Address Phone Number Fax Number    [facility] Accepted    Yes [address] [facility] [address] [phone]        Celi Del Real RN 12/6/2017 11:56    Spoke with [facility] And she states the patient is from independent living and they can accept back and she will follow for rehab if the patient needs it. [facility] Accept[facility]     [address] [facility] [address] [address]  [phone]        Expected Discharge Date and Time     Expected Discharge Date Expected Discharge Time    Dec 6, 2017               Demographic Summary     None            Functional Status     None            Psychosocial     None            Abuse/Neglect     None            Legal     None            Substance Abuse     None            Patient Forms     None          Celi Del Real RN

## 2017-12-06 NOTE — PROGRESS NOTES
Adult Nutrition  Assessment/PES    Patient Name:  Jada Keith  YOB: 1926  MRN: [id number]  Admit Date:  12/2/2017    Assessment Date:  12/6/2017    Comments:  Pt's intake/appetite improved. Eating 50-75% of meals. May benefit from daily mvi/min for wound healing support. Following.          Reason for Assessment       12/06/17 1224    Reason for Assessment    Reason For Assessment/Visit follow up protocol              Nutrition/Diet History       12/06/17 1224    Nutrition/Diet History    Appetite Improved              Labs/Tests/Procedures/Meds       12/06/17 1224    Labs/Tests/Procedures/Meds    Diagnostic Test/Procedure Review reviewed    Labs/Tests Review Reviewed;Glucose;BUN    Procedure Review MRI   r/o osteomyelitis of heel    Medication Review Reviewed, pertinent    Significant Vitals reviewed            Physical Findings       12/06/17 1225    Physical Appearance    Skin pressure ulcer(s)   R heel              Nutrition Prescription Ordered       12/06/17 1226    Nutrition Prescription PO    Supplement Glucerna Shake    Supplement Frequency 3 times a day    Common Modifiers Renal;Consistent Carbohydrate;Cardiac            Evaluation of Received Nutrient/Fluid Intake       12/06/17 1226    PO Evaluation    Number of Days PO Intake Evaluated 2 days    % PO Intake 50-75%            Problem/Interventions:                  Intervention Goal       12/06/17 1226    Intervention Goal    General Maintain nutrition;Reduce/improve symptoms;Disease management/therapy;Meet nutritional needs for age/condition    PO Continue positive trend;Increase intake;PO intake (%)    PO Intake % 85 %    Weight Appropriate weight gain            Nutrition Intervention       12/06/17 1226    Nutrition Intervention    RD/Tech Action Encourage intake;Interview for preference;Care plan reviewd;Follow Tx progress              Education/Evaluation       12/06/17 1227    Monitor/Evaluation    Monitor Per protocol         Electronically signed by:  Precious Escamilla RD  12/06/17 12:27 PM

## 2017-12-06 NOTE — DISCHARGE SUMMARY
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      Consulting Physician(s)     Provider Relationship Specialty    Alejandra Levy MD Consulting Physician Infectious Diseases        Hospital Course  Please see history and physical for details. Patient is a 89 y.o. female sent by visiting nurse for green drainage from right heel wound. She was seen in consultation by infectious diseases and they felt she did not clinically have osteomyelitis. MRI was done which showed no evidence of abscess or osteomyelitis. Recommend local wound care as per WOCN. Patient will be released pending clearance from ID.    Patient did have acute kidney injury. Her ACE inhibitor, diuretics, metformin were stopped. She was given fluids with resolution of her kidney injury after a couple of days. Creatinine is now normal though her blood pressure is low normal. She has a history of cardiomyopathy and EF of 22%. I am going to resume her ACE inhibitor and diuretics at lower doses with holding parameters. Recommend attempt to titrate back to previous doses as tolerated base on blood pressure and renal function.    Last admission she had an A1c of 10.95. She did had hypoglycemia this admission and her metformin and Glucotrol were held. She has had no further hypoglycemia and glucoses have been in the 100 range on sliding scale. For now will continue.     Condition on Discharge:  Improved, patient without complaint.    Temp:  [98 °F (36.7 °C)-98.6 °F (37 °C)] 98 °F (36.7 °C)  Heart Rate:  [61-67] 66  Resp:  [16-20] 16  BP: (100-104)/(48-62) 104/62  Body mass index is 23.04 kg/(m^2).    Physical Exam   Constitutional: She is oriented to person, place, and time. No distress.   Cardiovascular: Normal rate and regular rhythm.    Pulmonary/Chest: Effort normal and breath sounds normal. No respiratory distress.   Abdominal: Soft. There is no tenderness.   Neurological: She is alert and oriented to person, place, and time.     Discharge Medications   Jada Keith   Home Medication Instructions MELE:[id number]    Printed on:12/06/17 134   Medication Information                      aspirin 81 MG EC tablet  Take 1 tablet by mouth Daily.             calcium citrate-vitamin d (CITRACAL) 200-250 MG-UNIT tablet tablet  Take 1 tablet by mouth Daily.             digoxin (LANOXIN) 125 MCG tablet  Take 1 tablet by mouth Daily. Hold pulse less than 65             donepezil (ARICEPT) 10 MG tablet  Take 10 mg by mouth Every Night.             enalapril (VASOTEC) 2.5 MG tablet  Take 8 tablets by mouth Daily.             furosemide (LASIX) 40 MG tablet  Take 0.5 tablets by mouth Daily. Hold SBP less than 100             insulin aspart (novoLOG) 100 UNIT/ML injection  Inject 0-7 Units under the skin 4 (Four) Times a Day With Meals & at Bedtime.             ivabradine HCl (CORLANOR) 5 MG tablet tablet  Take 7.5 mg by mouth Daily With Breakfast & Dinner.             metoprolol succinate XL (TOPROL-XL) 100 MG 24 hr tablet  Take 0.5 tablets by mouth Daily. Hold pulse less than 65 or SBP less than 100             muscle rub (Merrill-Loo) 10-15 % cream cream  Apply  topically Every 1 (One) Hour As Needed (leg pain/cramps).             spironolactone (ALDACTONE) 25 MG tablet  Take 1 tablet by mouth Daily. Hold SBP less than 100               for insulin novolog SS  Blood glucose 150-199 mg/dL - 2 units  Blood glucose  200-249 mg/dL - 3 units  Blood glucose 250-299 mg/dL - 4 units  Blood glucose 300-349 mg/dL - 5 units  Blood glucose 350-400 mg/dL - 6 units  Blood glucose greater than 400 mg/dL - 7 units and call provider    Diet Instructions     Diet: Regular, Consistent Carbohydrate, Cardiac, Renal       Discharge Diet:   Regular  Consistent Carbohydrate  Cardiac  Renal                   Activity Instructions     Activity as Tolerated       Per physical therapy       Other Instructions (Specify)       PT for foot drop                Additional Instructions for the Follow-ups that You Need to Schedule     Call MD for problems / concerns.    As directed              Follow-up Information     Follow up with [facility] .    Specialties:  Skilled Nursing Facility, Intermediate Care Facility, Personal Care Services    Contact information:    [address] [place] [address] [address] [address] [phone]        Follow up with Alicia Leger MD .    Specialty:  Family Medicine    Contact information:    [facility] [facility] [facility] [address]  [facility] [address] [phone]           Xander Apodaca MD  12/06/17  1:42 PM    Discharge time spent greater than 30 minutes.

## 2017-12-06 NOTE — PROGRESS NOTES
Continued Stay Note  [facility]     Patient Name: Jada Keith  MRN: [id number]  Today's Date: 12/6/2017    Admit Date: 12/2/2017          Discharge Plan       12/06/17 1112    Case Management/Social Work Plan    Plan Home with [facility]     Additional Comments Late entry from 12/5/17: CCP met with patient and discussed PT recommendations for home health. Patient would like CCP to contact her daughter, Berkley Grullon ([phone]). Patient's daughter stated patient can be confused at times. Patient is current with [facility], referral made to [facility]. Per patient's daughter, patient lives alone in independent living but has [facility] come in 7 days a week  at 9 in the morning and 9 at night and for lunch time. Patient's daughter would like CCP to call her for discharge planning. CCP will follow for discharge home with [facility]. Nalini HONEYCUTT               Discharge Codes     None            Laura Sky, TANGELA

## 2017-12-06 NOTE — PLAN OF CARE
Problem: Patient Care Overview (Adult)  Goal: Plan of Care Review  Outcome: Ongoing (interventions implemented as appropriate)    12/06/17 0514   Coping/Psychosocial Response Interventions   Plan Of Care Reviewed With patient   Patient Care Overview   Progress improving   Outcome Evaluation   Outcome Summary/Follow up Plan VSS, pt ambulating to restroom with standby assist. Hoping for discharge today.         Problem: Fall Risk (Adult)  Goal: Identify Related Risk Factors and Signs and Symptoms  Outcome: Ongoing (interventions implemented as appropriate)  Goal: Absence of Falls  Outcome: Ongoing (interventions implemented as appropriate)    Problem: Skin Integrity Impairment, Risk/Actual (Adult)  Goal: Identify Related Risk Factors and Signs and Symptoms  Outcome: Ongoing (interventions implemented as appropriate)  Goal: Skin Integrity/Wound Healing  Outcome: Ongoing (interventions implemented as appropriate)    Problem: Infection, Risk/Actual (Adult)  Goal: Identify Related Risk Factors and Signs and Symptoms  Outcome: Ongoing (interventions implemented as appropriate)  Goal: Infection Prevention/Resolution  Outcome: Ongoing (interventions implemented as appropriate)    Problem: Pain, Acute (Adult)  Goal: Identify Related Risk Factors and Signs and Symptoms  Outcome: Ongoing (interventions implemented as appropriate)  Goal: Acceptable Pain Control/Comfort Level  Outcome: Ongoing (interventions implemented as appropriate)

## 2017-12-06 NOTE — DISCHARGE INSTRUCTIONS
LOW DOSE SLIDING SCALE COVERAGE FOR NOVOLOG INSULIN      -199=2 unit,200-249=3 unit,250-299=4 unit,300-349=5 unit,350-400=6 unit,>400=7 unit&call MD

## 2017-12-07 ENCOUNTER — PATIENT OUTREACH (OUTPATIENT)
Dept: CASE MANAGEMENT | Facility: OTHER | Age: 82
End: 2017-12-07

## 2017-12-07 ENCOUNTER — EPISODE CHANGES (OUTPATIENT)
Dept: CASE MANAGEMENT | Facility: OTHER | Age: 82
End: 2017-12-07

## 2017-12-12 ENCOUNTER — PATIENT OUTREACH (OUTPATIENT)
Dept: CASE MANAGEMENT | Facility: OTHER | Age: 82
End: 2017-12-12

## 2017-12-18 ENCOUNTER — PATIENT OUTREACH (OUTPATIENT)
Dept: CASE MANAGEMENT | Facility: OTHER | Age: 82
End: 2017-12-18

## 2017-12-26 ENCOUNTER — PATIENT OUTREACH (OUTPATIENT)
Dept: CASE MANAGEMENT | Facility: OTHER | Age: 82
End: 2017-12-26

## 2017-12-27 ENCOUNTER — PATIENT OUTREACH (OUTPATIENT)
Dept: CASE MANAGEMENT | Facility: OTHER | Age: 82
End: 2017-12-27

## 2017-12-28 ENCOUNTER — PATIENT OUTREACH (OUTPATIENT)
Dept: CASE MANAGEMENT | Facility: OTHER | Age: 82
End: 2017-12-28

## 2018-01-02 ENCOUNTER — PATIENT OUTREACH (OUTPATIENT)
Dept: CASE MANAGEMENT | Facility: OTHER | Age: 83
End: 2018-01-02

## 2018-01-03 ENCOUNTER — PATIENT OUTREACH (OUTPATIENT)
Dept: CASE MANAGEMENT | Facility: OTHER | Age: 83
End: 2018-01-03

## 2018-01-03 NOTE — OUTREACH NOTE
Number listed is daughter, she informed [facility] her mom is now in an Assisted Living at [facility]. Explained role of Care Advisor and contact information given to patient.No other questions or concerns voiced at this time.

## 2018-01-04 ENCOUNTER — EPISODE CHANGES (OUTPATIENT)
Dept: CASE MANAGEMENT | Facility: OTHER | Age: 83
End: 2018-01-04

## 2018-07-13 ENCOUNTER — EPISODE CHANGES (OUTPATIENT)
Dept: CASE MANAGEMENT | Facility: OTHER | Age: 83
End: 2018-07-13

## 2019-01-13 ENCOUNTER — HOSPITAL ENCOUNTER (EMERGENCY)
Facility: HOSPITAL | Age: 84
Discharge: HOME | End: 2019-01-13
Attending: EMERGENCY MEDICINE | Admitting: NURSE PRACTITIONER

## 2019-01-13 ENCOUNTER — APPOINTMENT (OUTPATIENT)
Dept: CT IMAGING | Facility: HOSPITAL | Age: 84
End: 2019-01-13

## 2019-01-13 VITALS
HEART RATE: 94 BPM | HEIGHT: 60 IN | BODY MASS INDEX: 22.97 KG/M2 | OXYGEN SATURATION: 98 % | TEMPERATURE: 97.5 F | RESPIRATION RATE: 14 BRPM | WEIGHT: 117 LBS | DIASTOLIC BLOOD PRESSURE: 82 MMHG | SYSTOLIC BLOOD PRESSURE: 126 MMHG
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
## 2019-01-13 DIAGNOSIS — S00.93XA CONTUSION OF HEAD, UNSPECIFIED PART OF HEAD, INITIAL ENCOUNTER: Primary | ICD-10-CM

## 2019-01-13 PROCEDURE — 99283 EMERGENCY DEPT VISIT LOW MDM: CPT

## 2019-01-13 PROCEDURE — 70450 CT HEAD/BRAIN W/O DYE: CPT

## 2019-01-13 RX ORDER — ONDANSETRON 4 MG/1
4 TABLET, FILM COATED ORAL EVERY 6 HOURS PRN
Status: ON HOLD | COMMUNITY
End: 2021-11-18

## 2019-01-13 RX ORDER — TORSEMIDE 20 MG/1
20 TABLET ORAL DAILY
COMMUNITY

## 2019-01-13 NOTE — ED TRIAGE NOTES
Nursing home reports unwitnessed fall, that patient states she fell. Patient baseline dementia. Patient does not remember falling has no complaints.

## 2019-01-14 NOTE — ED PROVIDER NOTES
MD ATTESTATION NOTE    Pt presents to the ED with a reported head injury s/p unwitnessed fall earlier tonight. Pt states that she struck the vertex of her head on the floor while attempting to get into bed. On exam, the pt has no C-Spine tenderness or obvious head trauma. Pt's family states that the pt is at her baseline. I agree with the plan to order a CT Head prior to disposition.     The ROSA ISELA and I have discussed this patient's history, physical exam, and treatment plan.  I have reviewed the documentation and personally had a face to face interaction with the patient. I affirm the documentation and agree with the treatment and plan.  The attached note describes my personal findings.    Documentation assistance provided by nelia Cunningham and Dona Merritt for Dr. Sena.  Information recorded by the scribe was done at my direction and has been verified and validated by me.       Dona Merritt  01/13/19 2009       Linsey Cunningham  01/13/19 2654       Eduard Sena MD  01/16/19 [id number]

## 2019-12-16 NOTE — PLAN OF CARE
PT DAILY TREATMENT NOTE  10-18    Patient Name: Roby Barbosa  Date:2019  : 1948  [x]  Patient  Verified  [place]or: [place] / Plan: [place] MED[place]ARE PART A & B / Product Type: Medicare /    In time: 11:40  Out time:12:25  Total Treatment Time (min): 45  Visit #: 1 of 10    Medicare/BCBS Only   Total Timed Codes (min):  24 1:1 Treatment Time:  45     Treatment Area: Spinal stenosis, cervical region [M48.02]  Radiculopathy, cervical region [M54.12]  Other cervical disc degeneration, unspecified cervical region [M50.30]    SUBJECTIVE  Pain Level (0-10 scale): 6  Any medication changes, allergies to medications, adverse drug reactions, diagnosis change, or new procedure performed?: [x] No    [] Yes (see summary sheet for update)  Subjective functional status/changes:   [] No changes reported  See POC    OBJECTIVE    21 min [x]Eval                  []Re-Eval     12 min Therapeutic Exercise:  [] See flow sheet : HEP instruction and demonstration   Rationale: increase ROM and increase strength to improve the patients ability to tolerate ADLs    12 min Therapeutic Activity:  []  See flow sheet : pt education regarding anatomy and physiology of the spine/UEs and how it relates to the pt's condition, pt education on using heat for 15-20 mins as needed to decrease pain/symptoms, pt education about discussing with her MD regarding having help at home for safety and to decrease fall risk. Rationale: increase ROM, increase strength and decrease pain/symptoms  to improve the patients ability to tolerate functional tasks. With   [x] TE   [x] TA   [] neuro   [] other: Patient Education: [x] Review HEP    [] Progressed/Changed HEP based on:   [] positioning   [] body mechanics   [] transfers   [] heat/ice application    [] other:      Other Objective/Functional Measures: See evaluation. Pain Level (0-10 scale) post treatment: 6    ASSESSMENT/Changes in Function: Pt given HEP handout to perform. Problem: Skin Integrity Impairment, Risk/Actual (Adult)  Goal: Skin Integrity/Wound Healing  Outcome: Ongoing (interventions implemented as appropriate)    12/04/17 1111   Skin Integrity Impairment, Risk/Actual (Adult)   Skin Integrity/Wound Healing making progress toward outcome      Encourage nutritional intake       Pt understood exercises in HEP handout. Pt demonstrated decreased AROM, impaired posture, muscle tightness, and tenderness to palpation. Unable to replicate right UE numbness today with c/s AROM or palpation. Did not assess balance secondary to TC today but pt ambulates with FWW. We will plan on improving the pt's balance and stability with gait as well secondary to pt report of recent falls increasing her neck pain. Pt would benefit from physical therapy to improve the above impairments to help the pt return to performing ADLs and functional activities. Patient will continue to benefit from skilled PT services to modify and progress therapeutic interventions, address functional mobility deficits, address ROM deficits, address strength deficits, analyze and address soft tissue restrictions, analyze and cue movement patterns, analyze and modify body mechanics/ergonomics, assess and modify postural abnormalities, address imbalance/dizziness and instruct in home and community integration to attain remaining goals. [x]  See Plan of Care  []  See progress note/recertification  []  See Discharge Summary         Progress towards goals / Updated goals:  Short Term Goals: To be accomplished in 2 treatments:  1. Pt will report compliance and independence to HEP to help the pt manage their pain and symptoms. Eval: established   Long Term Goals: To be accomplished in 10 treatments:  1. Pt will report a decrease in at worst pain to 2/10 in the neck to improve ability to perform functional tasks with less pain. Eval: 10/10 at worst  2. Pt will increase AROM c/s flex/EXT to WNL, left SB to 25 degs, right rotation to WNL, all with mild to no increased neck pain to improve ability to tolerate cooking activities. Eval: flex 47 degs with pain in the neck and skull, EXT 51 degs with pain to the shoulder blade, left SB 16 degs with pain in the left neck, right rotation 64 degs with pain in the left neck.    3. Pt will report having no falls over the past 2 weeks to improve the pt's safety at home and decrease risk of increased pain/injury.    Eval: reports falls 2 days ago    PLAN  [x]  Upgrade activities as tolerated     [x]  Continue plan of care  [x]  Update interventions per flow sheet       []  Discharge due to:_  []  Other:_      Katharine Lopez, FLYNN 12/16/2019  12:59 PM    Future Appointments   Date Time Provider Sherri Anguiano   12/16/2019 11:30 AM Syed Munoz PT [facility]   12/20/2019 10:25 AM Raymond Adame MD [facility]   2/27/2020 10:30 AM Genevieve Crane MD [facility] [address]

## 2020-01-16 ENCOUNTER — LAB REQUISITION (OUTPATIENT)
Dept: LAB | Facility: HOSPITAL | Age: 85
End: 2020-01-16

## 2020-01-16 DIAGNOSIS — Z00.00 ROUTINE GENERAL MEDICAL EXAMINATION AT A HEALTH CARE FACILITY: ICD-10-CM

## 2020-01-16 LAB — NT-PROBNP SERPL-MCNC: 1639 PG/ML (ref 5–1800)

## 2020-01-16 PROCEDURE — 83880 ASSAY OF NATRIURETIC PEPTIDE: CPT

## 2020-04-25 ENCOUNTER — LAB REQUISITION (OUTPATIENT)
Dept: LAB | Facility: HOSPITAL | Age: 85
End: 2020-04-25

## 2020-04-25 DIAGNOSIS — Z00.00 ROUTINE GENERAL MEDICAL EXAMINATION AT A HEALTH CARE FACILITY: ICD-10-CM

## 2020-04-25 LAB
ANION GAP SERPL CALCULATED.3IONS-SCNC: 19.3 MMOL/L (ref 5–15)
BASOPHILS # BLD AUTO: 0.02 10*3/MM3 (ref 0–0.2)
BASOPHILS NFR BLD AUTO: 0.4 % (ref 0–1.5)
BUN BLD-MCNC: 27 MG/DL (ref 8–23)
BUN/CREAT SERPL: 26.2 (ref 7–25)
CALCIUM SPEC-SCNC: 8.9 MG/DL (ref 8.2–9.6)
CHLORIDE SERPL-SCNC: 94 MMOL/L (ref 98–107)
CO2 SERPL-SCNC: 26.7 MMOL/L (ref 22–29)
CREAT BLD-MCNC: 1.03 MG/DL (ref 0.57–1)
DEPRECATED RDW RBC AUTO: 45.1 FL (ref 37–54)
EOSINOPHIL # BLD AUTO: 0.15 10*3/MM3 (ref 0–0.4)
EOSINOPHIL NFR BLD AUTO: 2.7 % (ref 0.3–6.2)
ERYTHROCYTE [DISTWIDTH] IN BLOOD BY AUTOMATED COUNT: 14.3 % (ref 12.3–15.4)
GFR SERPL CREATININE-BSD FRML MDRD: 50 ML/MIN/1.73
GLUCOSE BLD-MCNC: 102 MG/DL (ref 65–99)
HCT VFR BLD AUTO: 39.1 % (ref 34–46.6)
HGB BLD-MCNC: 12.9 G/DL (ref 12–15.9)
IMM GRANULOCYTES # BLD AUTO: 0.01 10*3/MM3 (ref 0–0.05)
IMM GRANULOCYTES NFR BLD AUTO: 0.2 % (ref 0–0.5)
LYMPHOCYTES # BLD AUTO: 1.69 10*3/MM3 (ref 0.7–3.1)
LYMPHOCYTES NFR BLD AUTO: 30.9 % (ref 19.6–45.3)
MCH RBC QN AUTO: 28.5 PG (ref 26.6–33)
MCHC RBC AUTO-ENTMCNC: 33 G/DL (ref 31.5–35.7)
MCV RBC AUTO: 86.3 FL (ref 79–97)
MONOCYTES # BLD AUTO: 0.68 10*3/MM3 (ref 0.1–0.9)
MONOCYTES NFR BLD AUTO: 12.4 % (ref 5–12)
NEUTROPHILS # BLD AUTO: 2.92 10*3/MM3 (ref 1.7–7)
NEUTROPHILS NFR BLD AUTO: 53.4 % (ref 42.7–76)
NRBC BLD AUTO-RTO: 0 /100 WBC (ref 0–0.2)
PLATELET # BLD AUTO: 206 10*3/MM3 (ref 140–450)
PMV BLD AUTO: 10.2 FL (ref 6–12)
POTASSIUM BLD-SCNC: 3.8 MMOL/L (ref 3.5–5.2)
RBC # BLD AUTO: 4.53 10*6/MM3 (ref 3.77–5.28)
SODIUM BLD-SCNC: 140 MMOL/L (ref 136–145)
WBC NRBC COR # BLD: 5.47 10*3/MM3 (ref 3.4–10.8)

## 2020-04-25 PROCEDURE — 85025 COMPLETE CBC W/AUTO DIFF WBC: CPT

## 2020-04-25 PROCEDURE — 80048 BASIC METABOLIC PNL TOTAL CA: CPT

## 2020-09-22 NOTE — PROGRESS NOTES
POD 2 right hip bipolar, comfortable, minimal pain  AF, VSS  Right hip- incision clean and dry, no swelling       Calf soft    DX- right hip fx, s/p bipolar    Plan- d/c planning       Slowly mobilize       DVT proph   Number Of Freeze-Thaw Cycles: 1 freeze-thaw cycle Render Note In Bullet Format When Appropriate: No Post-Care Instructions: I reviewed with the patient in detail post-care instructions. Patient is to wear sunprotection, and avoid picking at any of the treated lesions. Pt may apply Vaseline to crusted or scabbing areas. Detail Level: Detailed Duration Of Freeze Thaw-Cycle (Seconds): 10 Consent: The patient's consent was obtained including but not limited to risks of crusting, scabbing, blistering, scarring, darker or lighter pigmentary change, recurrence, incomplete removal and infection.

## 2021-11-17 ENCOUNTER — APPOINTMENT (OUTPATIENT)
Dept: CT IMAGING | Facility: HOSPITAL | Age: 86
End: 2021-11-17

## 2021-11-17 ENCOUNTER — HOSPITAL ENCOUNTER (OUTPATIENT)
Facility: HOSPITAL | Age: 86
Setting detail: OBSERVATION
Discharge: NURSING, REHAB OR LONG-TERM CARE | End: 2021-11-20
Attending: EMERGENCY MEDICINE | Admitting: STUDENT IN AN ORGANIZED HEALTH CARE EDUCATION/TRAINING PROGRAM

## 2021-11-17 ENCOUNTER — APPOINTMENT (OUTPATIENT)
Dept: GENERAL RADIOLOGY | Facility: HOSPITAL | Age: 86
End: 2021-11-17

## 2021-11-17 DIAGNOSIS — N17.9 AKI (ACUTE KIDNEY INJURY) (HCC): ICD-10-CM

## 2021-11-17 DIAGNOSIS — I10 PRIMARY HYPERTENSION: ICD-10-CM

## 2021-11-17 DIAGNOSIS — R33.9 URINARY RETENTION: ICD-10-CM
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
## 2021-11-17 DIAGNOSIS — F03.90 DEMENTIA WITHOUT BEHAVIORAL DISTURBANCE, UNSPECIFIED DEMENTIA TYPE: ICD-10-CM

## 2021-11-17 DIAGNOSIS — E11.9 TYPE 2 DIABETES MELLITUS WITHOUT COMPLICATION, WITHOUT LONG-TERM CURRENT USE OF INSULIN (HCC): ICD-10-CM

## 2021-11-17 DIAGNOSIS — S32.591A CLOSED FRACTURE OF RIGHT INFERIOR PUBIC RAMUS, INITIAL ENCOUNTER (HCC): Primary | ICD-10-CM

## 2021-11-17 DIAGNOSIS — I50.9 CONGESTIVE HEART FAILURE, UNSPECIFIED HF CHRONICITY, UNSPECIFIED HEART FAILURE TYPE (HCC): ICD-10-CM

## 2021-11-17 LAB
ALBUMIN SERPL-MCNC: 4.2 G/DL (ref 3.5–5.2)
ALBUMIN/GLOB SERPL: 1.3 G/DL
ALP SERPL-CCNC: 89 U/L (ref 39–117)
ALT SERPL W P-5'-P-CCNC: 10 U/L (ref 1–33)
ANION GAP SERPL CALCULATED.3IONS-SCNC: 12.6 MMOL/L (ref 5–15)
AST SERPL-CCNC: 15 U/L (ref 1–32)
BASOPHILS # BLD AUTO: 0.07 10*3/MM3 (ref 0–0.2)
BASOPHILS NFR BLD AUTO: 0.5 % (ref 0–1.5)
BILIRUB SERPL-MCNC: 0.2 MG/DL (ref 0–1.2)
BUN SERPL-MCNC: 41 MG/DL (ref 8–23)
BUN/CREAT SERPL: 32.3 (ref 7–25)
CALCIUM SPEC-SCNC: 9.6 MG/DL (ref 8.2–9.6)
CHLORIDE SERPL-SCNC: 99 MMOL/L (ref 98–107)
CO2 SERPL-SCNC: 26.4 MMOL/L (ref 22–29)
CREAT SERPL-MCNC: 1.27 MG/DL (ref 0.57–1)
DEPRECATED RDW RBC AUTO: 47.8 FL (ref 37–54)
DIGOXIN SERPL-MCNC: 0.8 NG/ML (ref 0.6–1.2)
EOSINOPHIL # BLD AUTO: 0.34 10*3/MM3 (ref 0–0.4)
EOSINOPHIL NFR BLD AUTO: 2.4 % (ref 0.3–6.2)
ERYTHROCYTE [DISTWIDTH] IN BLOOD BY AUTOMATED COUNT: 14.8 % (ref 12.3–15.4)
GFR SERPL CREATININE-BSD FRML MDRD: 39 ML/MIN/1.73
GLOBULIN UR ELPH-MCNC: 3.2 GM/DL
GLUCOSE SERPL-MCNC: 206 MG/DL (ref 65–99)
HCT VFR BLD AUTO: 37.5 % (ref 34–46.6)
HGB BLD-MCNC: 12.9 G/DL (ref 12–15.9)
IMM GRANULOCYTES # BLD AUTO: 0.18 10*3/MM3 (ref 0–0.05)
IMM GRANULOCYTES NFR BLD AUTO: 1.3 % (ref 0–0.5)
LYMPHOCYTES # BLD AUTO: 2.67 10*3/MM3 (ref 0.7–3.1)
LYMPHOCYTES NFR BLD AUTO: 18.8 % (ref 19.6–45.3)
MAGNESIUM SERPL-MCNC: 2.3 MG/DL (ref 1.7–2.3)
MCH RBC QN AUTO: 30.7 PG (ref 26.6–33)
MCHC RBC AUTO-ENTMCNC: 34.4 G/DL (ref 31.5–35.7)
MCV RBC AUTO: 89.3 FL (ref 79–97)
MONOCYTES # BLD AUTO: 0.72 10*3/MM3 (ref 0.1–0.9)
MONOCYTES NFR BLD AUTO: 5.1 % (ref 5–12)
NEUTROPHILS NFR BLD AUTO: 10.21 10*3/MM3 (ref 1.7–7)
NEUTROPHILS NFR BLD AUTO: 71.9 % (ref 42.7–76)
NRBC BLD AUTO-RTO: 0 /100 WBC (ref 0–0.2)
PLATELET # BLD AUTO: 193 10*3/MM3 (ref 140–450)
PMV BLD AUTO: 10.3 FL (ref 6–12)
POTASSIUM SERPL-SCNC: 3.9 MMOL/L (ref 3.5–5.2)
PROT SERPL-MCNC: 7.4 G/DL (ref 6–8.5)
RBC # BLD AUTO: 4.2 10*6/MM3 (ref 3.77–5.28)
SODIUM SERPL-SCNC: 138 MMOL/L (ref 136–145)
WBC NRBC COR # BLD: 14.19 10*3/MM3 (ref 3.4–10.8)

## 2021-11-17 PROCEDURE — 80053 COMPREHEN METABOLIC PANEL: CPT | Performed by: PHYSICIAN ASSISTANT

## 2021-11-17 PROCEDURE — 99285 EMERGENCY DEPT VISIT HI MDM: CPT

## 2021-11-17 PROCEDURE — 80162 ASSAY OF DIGOXIN TOTAL: CPT | Performed by: PHYSICIAN ASSISTANT

## 2021-11-17 PROCEDURE — 73502 X-RAY EXAM HIP UNI 2-3 VIEWS: CPT

## 2021-11-17 PROCEDURE — 70450 CT HEAD/BRAIN W/O DYE: CPT

## 2021-11-17 PROCEDURE — 83735 ASSAY OF MAGNESIUM: CPT | Performed by: PHYSICIAN ASSISTANT

## 2021-11-17 PROCEDURE — G0378 HOSPITAL OBSERVATION PER HR: HCPCS

## 2021-11-17 PROCEDURE — 51701 INSERT BLADDER CATHETER: CPT

## 2021-11-17 PROCEDURE — 85025 COMPLETE CBC W/AUTO DIFF WBC: CPT | Performed by: PHYSICIAN ASSISTANT

## 2021-11-18 PROBLEM — S32.591A CLOSED FRACTURE OF RIGHT INFERIOR PUBIC RAMUS (HCC): Status: ACTIVE | Noted: 2021-11-18

## 2021-11-18 PROBLEM — F03.90 DEMENTIA WITHOUT BEHAVIORAL DISTURBANCE (HCC): Status: ACTIVE | Noted: 2021-11-18

## 2021-11-18 PROBLEM — Z85.72 HISTORY OF NON-HODGKIN'S LYMPHOMA: Status: ACTIVE | Noted: 2021-11-18

## 2021-11-18 LAB
ANION GAP SERPL CALCULATED.3IONS-SCNC: 11.4 MMOL/L (ref 5–15)
BILIRUB UR QL STRIP: NEGATIVE
BUN SERPL-MCNC: 35 MG/DL (ref 8–23)
BUN/CREAT SERPL: 29.9 (ref 7–25)
CALCIUM SPEC-SCNC: 9.3 MG/DL (ref 8.2–9.6)
CHLORIDE SERPL-SCNC: 100 MMOL/L (ref 98–107)
CLARITY UR: CLEAR
CO2 SERPL-SCNC: 28.6 MMOL/L (ref 22–29)
COLOR UR: YELLOW
CREAT SERPL-MCNC: 1.17 MG/DL (ref 0.57–1)
DEPRECATED RDW RBC AUTO: 47.3 FL (ref 37–54)
ERYTHROCYTE [DISTWIDTH] IN BLOOD BY AUTOMATED COUNT: 14.6 % (ref 12.3–15.4)
GFR SERPL CREATININE-BSD FRML MDRD: 43 ML/MIN/1.73
GLUCOSE BLDC GLUCOMTR-MCNC: 170 MG/DL (ref 70–130)
GLUCOSE BLDC GLUCOMTR-MCNC: 176 MG/DL (ref 70–130)
GLUCOSE BLDC GLUCOMTR-MCNC: 184 MG/DL (ref 70–130)
GLUCOSE BLDC GLUCOMTR-MCNC: 206 MG/DL (ref 70–130)
GLUCOSE SERPL-MCNC: 214 MG/DL (ref 65–99)
GLUCOSE UR STRIP-MCNC: NEGATIVE MG/DL
HBA1C MFR BLD: 7.52 % (ref 4.8–5.6)
HCT VFR BLD AUTO: 37.1 % (ref 34–46.6)
HGB BLD-MCNC: 12.5 G/DL (ref 12–15.9)
HGB UR QL STRIP.AUTO: NEGATIVE
KETONES UR QL STRIP: NEGATIVE
LEUKOCYTE ESTERASE UR QL STRIP.AUTO: NEGATIVE
MCH RBC QN AUTO: 30.3 PG (ref 26.6–33)
MCHC RBC AUTO-ENTMCNC: 33.7 G/DL (ref 31.5–35.7)
MCV RBC AUTO: 90 FL (ref 79–97)
NITRITE UR QL STRIP: NEGATIVE
PH UR STRIP.AUTO: 6 [PH] (ref 5–8)
PLATELET # BLD AUTO: 161 10*3/MM3 (ref 140–450)
PMV BLD AUTO: 9.9 FL (ref 6–12)
POTASSIUM SERPL-SCNC: 4.4 MMOL/L (ref 3.5–5.2)
PROT UR QL STRIP: NEGATIVE
RBC # BLD AUTO: 4.12 10*6/MM3 (ref 3.77–5.28)
SARS-COV-2 ORF1AB RESP QL NAA+PROBE: NOT DETECTED
SODIUM SERPL-SCNC: 140 MMOL/L (ref 136–145)
SP GR UR STRIP: 1.01 (ref 1–1.03)
UROBILINOGEN UR QL STRIP: NORMAL
WBC NRBC COR # BLD: 12.65 10*3/MM3 (ref 3.4–10.8)

## 2021-11-18 PROCEDURE — 82962 GLUCOSE BLOOD TEST: CPT

## 2021-11-18 PROCEDURE — 85027 COMPLETE CBC AUTOMATED: CPT | Performed by: NURSE PRACTITIONER

## 2021-11-18 PROCEDURE — 80048 BASIC METABOLIC PNL TOTAL CA: CPT | Performed by: NURSE PRACTITIONER

## 2021-11-18 PROCEDURE — 81003 URINALYSIS AUTO W/O SCOPE: CPT | Performed by: PHYSICIAN ASSISTANT

## 2021-11-18 PROCEDURE — 51798 US URINE CAPACITY MEASURE: CPT

## 2021-11-18 PROCEDURE — 83036 HEMOGLOBIN GLYCOSYLATED A1C: CPT | Performed by: NURSE PRACTITIONER

## 2021-11-18 PROCEDURE — P9612 CATHETERIZE FOR URINE SPEC: HCPCS

## 2021-11-18 PROCEDURE — G0378 HOSPITAL OBSERVATION PER HR: HCPCS

## 2021-11-18 PROCEDURE — 51701 INSERT BLADDER CATHETER: CPT

## 2021-11-18 PROCEDURE — U0005 INFEC AGEN DETEC AMPLI PROBE: HCPCS | Performed by: PHYSICIAN ASSISTANT

## 2021-11-18 PROCEDURE — 97162 PT EVAL MOD COMPLEX 30 MIN: CPT

## 2021-11-18 PROCEDURE — 97110 THERAPEUTIC EXERCISES: CPT

## 2021-11-18 PROCEDURE — U0004 COV-19 TEST NON-CDC HGH THRU: HCPCS | Performed by: PHYSICIAN ASSISTANT

## 2021-11-18 RX ORDER — INSULIN GLARGINE 100 [IU]/ML
10 INJECTION, SOLUTION SUBCUTANEOUS NIGHTLY
COMMUNITY
Start: 2021-11-18

## 2021-11-18 RX ORDER — MIRTAZAPINE 15 MG/1
7.5 TABLET, FILM COATED ORAL NIGHTLY
Status: DISCONTINUED | OUTPATIENT
Start: 2021-11-18 | End: 2021-11-20 | Stop reason: HOSPADM

## 2021-11-18 RX ORDER — CHOLECALCIFEROL (VITAMIN D3) 125 MCG
5 CAPSULE ORAL NIGHTLY
Status: DISCONTINUED | OUTPATIENT
Start: 2021-11-18 | End: 2021-11-20 | Stop reason: HOSPADM

## 2021-11-18 RX ORDER — ACETAMINOPHEN 325 MG/1
650 TABLET ORAL EVERY 4 HOURS PRN
Status: DISCONTINUED | OUTPATIENT
Start: 2021-11-18 | End: 2021-11-20 | Stop reason: HOSPADM

## 2021-11-18 RX ORDER — MAGNESIUM SULFATE HEPTAHYDRATE 40 MG/ML
2 INJECTION, SOLUTION INTRAVENOUS AS NEEDED
Status: DISCONTINUED | OUTPATIENT
Start: 2021-11-18 | End: 2021-11-20 | Stop reason: HOSPADM

## 2021-11-18 RX ORDER — ALLOPURINOL 100 MG/1
100 TABLET ORAL DAILY
COMMUNITY

## 2021-11-18 RX ORDER — ACETAMINOPHEN 160 MG/5ML
650 SOLUTION ORAL EVERY 4 HOURS PRN
Status: DISCONTINUED | OUTPATIENT
Start: 2021-11-18 | End: 2021-11-20 | Stop reason: HOSPADM

## 2021-11-18 RX ORDER — POTASSIUM CHLORIDE 750 MG/1
40 TABLET, FILM COATED, EXTENDED RELEASE ORAL AS NEEDED
Status: DISCONTINUED | OUTPATIENT
Start: 2021-11-18 | End: 2021-11-20 | Stop reason: HOSPADM

## 2021-11-18 RX ORDER — MIRTAZAPINE 15 MG/1
7.5 TABLET, FILM COATED ORAL NIGHTLY
COMMUNITY

## 2021-11-18 RX ORDER — SODIUM CHLORIDE 0.9 % (FLUSH) 0.9 %
10 SYRINGE (ML) INJECTION EVERY 12 HOURS SCHEDULED
Status: DISCONTINUED | OUTPATIENT
Start: 2021-11-18 | End: 2021-11-20 | Stop reason: HOSPADM

## 2021-11-18 RX ORDER — BISACODYL 10 MG
10 SUPPOSITORY, RECTAL RECTAL DAILY PRN
Status: DISCONTINUED | OUTPATIENT
Start: 2021-11-18 | End: 2021-11-20 | Stop reason: HOSPADM

## 2021-11-18 RX ORDER — AMOXICILLIN 250 MG
2 CAPSULE ORAL DAILY
Status: DISCONTINUED | OUTPATIENT
Start: 2021-11-18 | End: 2021-11-20 | Stop reason: HOSPADM

## 2021-11-18 RX ORDER — SODIUM CHLORIDE 0.9 % (FLUSH) 0.9 %
10 SYRINGE (ML) INJECTION AS NEEDED
Status: DISCONTINUED | OUTPATIENT
Start: 2021-11-18 | End: 2021-11-20 | Stop reason: HOSPADM

## 2021-11-18 RX ORDER — POTASSIUM CHLORIDE 1.5 G/1.77G
40 POWDER, FOR SOLUTION ORAL AS NEEDED
Status: DISCONTINUED | OUTPATIENT
Start: 2021-11-18 | End: 2021-11-20 | Stop reason: HOSPADM

## 2021-11-18 RX ORDER — ONDANSETRON 2 MG/ML
4 INJECTION INTRAMUSCULAR; INTRAVENOUS EVERY 6 HOURS PRN
Status: DISCONTINUED | OUTPATIENT
Start: 2021-11-18 | End: 2021-11-20 | Stop reason: HOSPADM

## 2021-11-18 RX ORDER — ACETAMINOPHEN 650 MG/1
650 SUPPOSITORY RECTAL EVERY 4 HOURS PRN
Status: DISCONTINUED | OUTPATIENT
Start: 2021-11-18 | End: 2021-11-20 | Stop reason: HOSPADM

## 2021-11-18 RX ORDER — METOPROLOL SUCCINATE 25 MG/1
25 TABLET, EXTENDED RELEASE ORAL DAILY
Status: DISCONTINUED | OUTPATIENT
Start: 2021-11-18 | End: 2021-11-20 | Stop reason: HOSPADM

## 2021-11-18 RX ORDER — ACETAMINOPHEN 500 MG
500 TABLET ORAL DAILY
COMMUNITY

## 2021-11-18 RX ORDER — DIGOXIN 125 MCG
125 TABLET ORAL
Status: DISCONTINUED | OUTPATIENT
Start: 2021-11-19 | End: 2021-11-20 | Stop reason: HOSPADM

## 2021-11-18 RX ORDER — POLYETHYLENE GLYCOL 3350 17 G/17G
17 POWDER, FOR SOLUTION ORAL DAILY PRN
Status: DISCONTINUED | OUTPATIENT
Start: 2021-11-18 | End: 2021-11-20 | Stop reason: HOSPADM

## 2021-11-18 RX ORDER — CHOLECALCIFEROL (VITAMIN D3) 125 MCG
5 CAPSULE ORAL NIGHTLY PRN
Status: DISCONTINUED | OUTPATIENT
Start: 2021-11-18 | End: 2021-11-20 | Stop reason: HOSPADM

## 2021-11-18 RX ORDER — MAGNESIUM SULFATE HEPTAHYDRATE 40 MG/ML
4 INJECTION, SOLUTION INTRAVENOUS AS NEEDED
Status: DISCONTINUED | OUTPATIENT
Start: 2021-11-18 | End: 2021-11-20 | Stop reason: HOSPADM

## 2021-11-18 RX ORDER — CALCIUM CARBONATE 200(500)MG
2 TABLET,CHEWABLE ORAL 2 TIMES DAILY PRN
Status: DISCONTINUED | OUTPATIENT
Start: 2021-11-18 | End: 2021-11-20 | Stop reason: HOSPADM

## 2021-11-18 RX ORDER — CHOLECALCIFEROL (VITAMIN D3) 125 MCG
5 CAPSULE ORAL NIGHTLY
COMMUNITY

## 2021-11-18 RX ORDER — ALLOPURINOL 100 MG/1
100 TABLET ORAL DAILY
Status: DISCONTINUED | OUTPATIENT
Start: 2021-11-18 | End: 2021-11-20 | Stop reason: HOSPADM

## 2021-11-18 RX ORDER — ONDANSETRON 4 MG/1
4 TABLET, FILM COATED ORAL EVERY 6 HOURS PRN
Status: DISCONTINUED | OUTPATIENT
Start: 2021-11-18 | End: 2021-11-20 | Stop reason: HOSPADM

## 2021-11-18 RX ORDER — HYDROCODONE BITARTRATE AND ACETAMINOPHEN 5; 325 MG/1; MG/1
1 TABLET ORAL EVERY 6 HOURS PRN
Status: DISCONTINUED | OUTPATIENT
Start: 2021-11-18 | End: 2021-11-20 | Stop reason: HOSPADM

## 2021-11-18 RX ORDER — TRAMADOL HYDROCHLORIDE 50 MG/1
50 TABLET ORAL EVERY 8 HOURS PRN
COMMUNITY
End: 2021-11-20 | Stop reason: HOSPADM

## 2021-11-18 RX ORDER — SODIUM CHLORIDE 9 MG/ML
75 INJECTION, SOLUTION INTRAVENOUS CONTINUOUS
Status: DISCONTINUED | OUTPATIENT
Start: 2021-11-18 | End: 2021-11-19

## 2021-11-18 RX ADMIN — MIRTAZAPINE 7.5 MG: 15 TABLET, FILM COATED ORAL at 22:18

## 2021-11-18 RX ADMIN — SODIUM CHLORIDE, PRESERVATIVE FREE 10 ML: 5 INJECTION INTRAVENOUS at 13:23

## 2021-11-18 RX ADMIN — SODIUM CHLORIDE 75 ML/HR: 9 INJECTION, SOLUTION INTRAVENOUS at 13:19

## 2021-11-18 RX ADMIN — Medication 5 MG: at 22:18

## 2021-11-18 RX ADMIN — DOCUSATE SODIUM 50MG AND SENNOSIDES 8.6MG 2 TABLET: 8.6; 5 TABLET, FILM COATED ORAL at 16:42

## 2021-11-18 RX ADMIN — ALLOPURINOL 100 MG: 100 TABLET ORAL at 16:42

## 2021-11-18 RX ADMIN — SODIUM CHLORIDE, PRESERVATIVE FREE 10 ML: 5 INJECTION INTRAVENOUS at 22:18

## 2021-11-18 RX ADMIN — SODIUM CHLORIDE, PRESERVATIVE FREE 10 ML: 5 INJECTION INTRAVENOUS at 06:55

## 2021-11-18 NOTE — PROGRESS NOTES
"Consult came in after I finished rounding this morning.  Patient to be seen tomorrow morning.  Based on review of the images she does not require any orthopedic intervention.  Okay for weightbearing as tolerated and physical therapy.    R \"Maxwell\" Alvin RAMOS MD  Orthopaedic Surgery  [facility]  [phone] - [place] Office  [phone] - [place] Office    "

## 2021-11-18 NOTE — CASE MANAGEMENT/SOCIAL WORK
Discharge Planning Assessment  [facility]     Patient Name: Jada Keith  MRN: [id number]  Today's Date: 11/18/2021    Admit Date: 11/17/2021     Discharge Needs Assessment     Row Name 11/18/21 1511       Living Environment    Lives With facility resident    Current Living Arrangements independent/assisted living facility    Primary Care Provided by self    Provides Primary Care For no one    Family Caregiver if Needed child(lacho), adult    Quality of Family Relationships involved; helpful    Able to Return to Prior Arrangements yes       Resource/Environmental Concerns    Resource/Environmental Concerns none       Transition Planning    Patient/Family Anticipates Transition to inpatient rehabilitation facility    Patient/Family Anticipated Services at Transition home health care    Transportation Anticipated health plan transportation       Discharge Needs Assessment    Readmission Within the Last 30 Days no previous admission in last 30 days    Equipment Currently Used at Home walker, rolling    Provided Post Acute Provider List? Yes    Post Acute Provider List Home Health; Nursing Home    Delivered To Support Person    Method of Delivery In person               Discharge Plan     Row Name 11/18/21 1512       Plan    Plan from PC at [facility].    Patient/Family in Agreement with Plan yes    Plan Comments Spoke with pt’s daughter Berkley leggett. Confirmed facesheet correct. Explained role of CCP. Pt is from  level of care at [facility]. She needs assistance with ADLs and has rolling walker. Pt has had PT at [facility]. No SNF history. Spoke with Penelope at [facility], faxed updated PT notes so they can review to make sure pt can return to  level. SNF list left with daughter in case pt can’t return. CCP to follow up.              Continued Care and Services - Admitted Since 11/17/2021     Destination     Service Provider Request Status Selected Services Address Phone Fax Patient Preferred     [facility]  Pending - Request Sent N/A [address], [place] [address] [phone] --                 Demographic Summary     Row Name 11/18/21 1508       General Information    Admission Type observation    Required Notices Provided Observation Status Notice    Reason for Consult discharge planning    Preferred Language English     Used During This Interaction no               Functional Status     Row Name 11/18/21 1510       Functional Status    Usual Activity Tolerance good    Current Activity Tolerance moderate       Functional Status, IADL    Medications assistive person    Meal Preparation assistive person    Housekeeping assistive person    Laundry assistive person    Shopping assistive person               Psychosocial    No documentation.                Abuse/Neglect    No documentation.                Legal    No documentation.                Substance Abuse    No documentation.                Patient Forms    No documentation.                   MATT Gabriel

## 2021-11-18 NOTE — ED PROVIDER NOTES
MD ATTESTATION NOTE    The ROSA ISELA and I have discussed this patient's history, physical exam, and treatment plan.  I have reviewed the documentation and personally had a face to face interaction with the patient. I affirm the documentation and agree with the treatment and plan.  The attached note describes my personal findings.      Jada Keith is a 89 y.o. female who presents to the ED c/o having a fall today.  She was using a walker and she fell.  She landed on her right side.  She reports pain to her right hip.  She has not been weightbearing.  Family reports she had a fall last week as well.  There is no reported head injury.  Family reports she has been acting normally.  She has a history of dementia.      On exam:  GENERAL: Awake, alert, no acute distress  SKIN: Warm, dry  HENT: Normocephalic, atraumatic  EYES: no scleral icterus  CV: regular rhythm, regular rate  RESPIRATORY: normal effort, lungs clear  ABDOMEN: soft, mild suprapubic tenderness and distention, nondistended  MUSCULOSKELETAL: no deformity, tenderness in her right medial hip.  Intact distal pulses.  Normal sensation and motor.  NEURO: alert, moves all extremities, follows commands    Labs  Recent Results (from the past 24 hour(s))   Comprehensive Metabolic Panel    Collection Time: 11/17/21 11:07 PM    Specimen: Blood   Result Value Ref Range    Glucose 206 (H) 65 - 99 mg/dL    BUN 41 (H) 8 - 23 mg/dL    Creatinine 1.27 (H) 0.57 - 1.00 mg/dL    Sodium 138 136 - 145 mmol/L    Potassium 3.9 3.5 - 5.2 mmol/L    Chloride 99 98 - 107 mmol/L    CO2 26.4 22.0 - 29.0 mmol/L    Calcium 9.6 8.2 - 9.6 mg/dL    Total Protein 7.4 6.0 - 8.5 g/dL    Albumin 4.20 3.50 - 5.20 g/dL    ALT (SGPT) 10 1 - 33 U/L    AST (SGOT) 15 1 - 32 U/L    Alkaline Phosphatase 89 39 - 117 U/L    Total Bilirubin 0.2 0.0 - 1.2 mg/dL    eGFR Non African Amer 39 (L) >60 mL/min/1.73    Globulin 3.2 gm/dL    A/G Ratio 1.3 g/dL    BUN/Creatinine Ratio 32.3 (H) 7.0 - 25.0    Anion Gap  12.6 5.0 - 15.0 mmol/L   Magnesium    Collection Time: 11/17/21 11:07 PM    Specimen: Blood   Result Value Ref Range    Magnesium 2.3 1.7 - 2.3 mg/dL   Digoxin Level    Collection Time: 11/17/21 11:07 PM    Specimen: Blood   Result Value Ref Range    Digoxin 0.80 0.60 - 1.20 ng/mL   CBC Auto Differential    Collection Time: 11/17/21 11:07 PM    Specimen: Blood   Result Value Ref Range    WBC 14.19 (H) 3.40 - 10.80 10*3/mm3    RBC 4.20 3.77 - 5.28 10*6/mm3    Hemoglobin 12.9 12.0 - 15.9 g/dL    Hematocrit 37.5 34.0 - 46.6 %    MCV 89.3 79.0 - 97.0 fL    MCH 30.7 26.6 - 33.0 pg    MCHC 34.4 31.5 - 35.7 g/dL    RDW 14.8 12.3 - 15.4 %    RDW-SD 47.8 37.0 - 54.0 fl    MPV 10.3 6.0 - 12.0 fL    Platelets 193 140 - 450 10*3/mm3    Neutrophil % 71.9 42.7 - 76.0 %    Lymphocyte % 18.8 (L) 19.6 - 45.3 %    Monocyte % 5.1 5.0 - 12.0 %    Eosinophil % 2.4 0.3 - 6.2 %    Basophil % 0.5 0.0 - 1.5 %    Immature Grans % 1.3 (H) 0.0 - 0.5 %    Neutrophils, Absolute 10.21 (H) 1.70 - 7.00 10*3/mm3    Lymphocytes, Absolute 2.67 0.70 - 3.10 10*3/mm3    Monocytes, Absolute 0.72 0.10 - 0.90 10*3/mm3    Eosinophils, Absolute 0.34 0.00 - 0.40 10*3/mm3    Basophils, Absolute 0.07 0.00 - 0.20 10*3/mm3    Immature Grans, Absolute 0.18 (H) 0.00 - 0.05 10*3/mm3    nRBC 0.0 0.0 - 0.2 /100 WBC   Urinalysis With Microscopic If Indicated (No Culture) - Urine, Catheter    Collection Time: 11/18/21 12:06 AM    Specimen: Urine, Catheter   Result Value Ref Range    Color, UA Yellow Yellow, Straw    Appearance, UA Clear Clear    pH, UA 6.0 5.0 - 8.0    Specific Gravity, UA 1.011 1.005 - 1.030    Glucose, UA Negative Negative    Ketones, UA Negative Negative    Bilirubin, UA Negative Negative    Blood, UA Negative Negative    Protein, UA Negative Negative    Leuk Esterase, UA Negative Negative    Nitrite, UA Negative Negative    Urobilinogen, UA 0.2 E.U./dL 0.2 - 1.0 E.U./dL       Radiology  CT Head Without Contrast    Result Date: 11/18/2021  CT HEAD  WITHOUT CONTRAST  HISTORY: Fall  COMPARISON: 01/13/2019  TECHNIQUE: Axial CT imaging was obtained through the brain. No IV contrast was administered.  FINDINGS: No acute intracranial hemorrhage is seen. There is diffuse atrophy. There is periventricular and deep white matter microangiopathic change. There is no midline shift or mass effect. Paranasal sinuses and mastoid air cells appear clear. No calvarial fracture is seen.      No acute intracranial findings.  Radiation dose reduction techniques were utilized, including automated exposure control and exposure modulation based on body size.  This report was finalized on 11/18/2021 12:17 AM by Dr. Aliza Mitchell M.D.      XR Hip With or Without Pelvis 2 - 3 View Right    Result Date: 11/18/2021  AP VIEW THE PELVIS PLUS 2 VIEWS RIGHT HIP  HISTORY: Fall with right hip pain  COMPARISON: 10/08/2017  FINDINGS: Patient has a right hip arthroplasty. It is able in position when compared to prior exam from October 2017. There is deformity of the right inferior pubic ramus, new when compared to prior exam. Appearance is concerning for acute fracture. I cannot exclude a nondisplaced fracture of the right superior pubic ramus. No acute fracture or subluxation of the left hip is seen. There are degenerative changes of both SI joints, as well as of the lumbosacral spine.      Right inferior pubic ramus fracture. Possible nondisplaced fracture of the right superior pubic ramus.  This report was finalized on 11/18/2021 12:07 AM by Dr. Aliza Mitchell M.D.         Medical Decision Making:  ED Course as of 11/18/21 0159   Wed Nov 17, 2021 2336 WBC(!): 14.19 [JUAN]   2336 Hemoglobin: 12.9 [JUAN]   2336 Platelets: 193 [JUAN]   2336 Glucose(!): 206 [JUAN]   2336 BUN(!): 41 [JUAN]   2337 Creatinine(!): 1.27 [JUAN]   2337 Sodium: 138 [JUAN]   2337 Potassium: 3.9 [JUAN]   2337 Magnesium: 2.3 [JUAN]   2337 Chloride: 99 [JUAN]   2337 CO2: 26.4 [JUAN]   2337 Digoxin: 0.80 [JUAN]   Thu Nov 18, 2021   0008  Patient had 725 mL in her bladder after catheterization. [JUAN]   0124 Patient care discussed with Laura NIKI, will place in observation to a [facility] bed per Dr. Carmichael. [JUAN]      ED Course User Index  [JUAN] Faisal Calderon PA       Plan laboratory evaluation including urinalysis, chemistries, blood counts.  Plan x-ray of the right hip and CT of the head given her fall and injury.    PPE: The patient wore a mask and I wore a N95 mask throughout the entire patient encounter.      The patient has started, but not completed, their COVID-19 vaccination series.    Diagnosis  Final diagnoses:   Closed fracture of right inferior pubic ramus, initial encounter (HCC)   Fall at nursing home, initial encounter   MARV (acute kidney injury) (HCC)   Urinary retention   Dementia without behavioral disturbance, unspecified dementia type (HCC)   Type 2 diabetes mellitus without complication, without long-term current use of insulin (HCC)   Congestive heart failure, unspecified HF chronicity, unspecified heart failure type (HCC)   Primary hypertension        Luis Mishra MD  11/18/21 0159

## 2021-11-18 NOTE — DISCHARGE PLACEMENT REQUEST
Jada Keith (89 y.o. Female)             Date of Birth Social Security Number Address Home Phone MRN    04/10/1926  [address]  [facility] [place] [place] [address] [phone] [id number]    [employer] Marital Status             Sikh        Admission Date Admission Type Admitting Provider Attending Provider Department, Room/Bed    11/17/21 Emergency Gilda Nuno DO George, Katherine, DO [facility] [address] [place], P882/1    Discharge Date Discharge Disposition Discharge Destination                         Attending Provider: Gilda Nuno DO    Allergies: Sertraline, Risedronate Sodium, Sulfa Antibiotics    Isolation: None   Infection: None   Code Status: CPR   Advance Care Planning Activity    Ht: --   Wt: --    Admission Cmt: None   Principal Problem: Closed fracture of right inferior pubic ramus (HCC) [S32.591A]                 Active Insurance as of 11/17/2021     Primary Coverage     Payor Plan Insurance Group Employer/Plan Group    MEDICARE MEDICARE A & B      Payor Plan Address Payor Plan Phone Number Payor Plan Fax Number Effective Dates    [address] [phone]  4/1/1991 - None Entered    [facility] [id number]       Subscriber Name Subscriber Birth Date Member ID       JADA KEITH 4/10/1926 [id number]           Secondary Coverage     Payor Plan Insurance Group Employer/Plan Group    AARP MC SUP AAR HEALTH CARE OPTIONS PLAN N     Payor Plan Address Payor Plan Phone Number Payor Plan Fax Number Effective Dates    [facility] [phone]  1/1/2013 - None Entered    [address]       [facility] [id number]       Subscriber Name Subscriber Birth Date Member ID       JADA KEITH 4/10/1926 [id number]                 Emergency Contacts      (Rel.) Home Phone Work Phone Mobile Phone    Berkley Grullon (Daughter) [phone] -- [phone]               History & Physical      Kathy Ngo, APRN at 11/18/21 0808               Patient Name:  Jada Keith  YOB: 1926  MRN:  [id number]  Admit Date:  11/17/2021  Patient Care Team:  Angela Cantu APRN as PCP - General (Family Medicine)      Subjective   History Present Illness     Chief Complaint   Patient presents with   • Fall       Ms. Keith is a 89 y.o. female with a history of CHF, CAD, DM, lymphoma, dementia, GERD, HTN that presents to [facility] complaining of rt hip pain. Pt sustained a fall at the nursing facility resulting in rt hip pain and inability to ambulate. Denies hitting her head but she is not a good historian; thought she was in the hospital because she hasn't been eating. Xrays showed rt inferior pubic ramus fx & possible nondisplaced fx of rt superior pubic ramus. CTH was negative. She had mild MARV w/Cr up to 1.27 and urinary retention, IC'd for 725 ml. On exam, denies pain, shortness of breath, nausea, abd pain/discomfort    Afebrile. HR controlled. BP stable. On room air. WBC 14, Hgb 12.9. Dig 0.80. Mg 2.3. Gluc 206, BUN/Cr 41/1.27. Covid pending. UA neg.     Review of Systems   Unable to perform ROS: Other (as above; full ROS unable to be performed due to dementia)        Personal History     Past Medical History:   Diagnosis Date   • Arthritis    • CHF (congestive heart failure) (HCC)    • Coronary artery disease    • DM type 2 (diabetes mellitus, type 2), new diagnosis 10/8/2017   • GERD (gastroesophageal reflux disease)    • History of transfusion    • Hypertension    • Lymphoma (HCC)    • Status post chemotherapy      Past Surgical History:   Procedure Laterality Date   • ABDOMINAL SURGERY      tumor removals due to cancer   • APPENDECTOMY     • BACK SURGERY     • CARDIAC CATHETERIZATION     • CHOLECYSTECTOMY     • EYE SURGERY      cataract surgery   • FRACTURE SURGERY      right hip   • HYSTERECTOMY     • JOINT REPLACEMENT      right hip   • PA PARTIAL HIP REPLACEMENT Right 10/8/2017    Procedure: CEMENTED  BIPOLAR;  Surgeon: Michael England MD;  Location: [facility] MAIN OR;  Service: Orthopedics   • SKIN BIOPSY     • TONSILLECTOMY     • TUMOR REMOVAL       Family History   Family history unknown: Yes     Social History     Tobacco Use   • Smoking status: Never Smoker   • Smokeless tobacco: Never Used   Substance Use Topics   • Alcohol use: Yes     Comment: once a year   • Drug use: No     No current facility-administered medications on file prior to encounter.     Current Outpatient Medications on File Prior to Encounter   Medication Sig Dispense Refill   • aspirin 81 MG EC tablet Take 1 tablet by mouth Daily.     • calcium citrate-vitamin d (CITRACAL) 200-250 MG-UNIT tablet tablet Take 1 tablet by mouth Daily.     • digoxin (LANOXIN) 125 MCG tablet Take 1 tablet by mouth Daily. Hold pulse less than 65     • donepezil (ARICEPT) 10 MG tablet Take 10 mg by mouth Every Night.     • enalapril (VASOTEC) 2.5 MG tablet Take 8 tablets by mouth Daily. (Patient taking differently: Take 5 mg by mouth Daily.)     • furosemide (LASIX) 40 MG tablet Take 0.5 tablets by mouth Daily. Hold SBP less than 100 (Patient taking differently: Take 40 mg by mouth 2 (Two) Times a Day. Hold SBP less than 100)     • insulin aspart (novoLOG) 100 UNIT/ML injection Inject 0-7 Units under the skin 4 (Four) Times a Day With Meals & at Bedtime.  12   • ivabradine HCl (CORLANOR) 5 MG tablet tablet Take 7.5 mg by mouth Daily With Breakfast & Dinner.     • metoprolol succinate XL (TOPROL-XL) 100 MG 24 hr tablet Take 0.5 tablets by mouth Daily. Hold pulse less than 65 or SBP less than 100 (Patient taking differently: Take 25 mg by mouth Daily. Hold pulse less than 65 or SBP less than 100)     • muscle rub (Merrill-Loo) 10-15 % cream cream Apply  topically Every 1 (One) Hour As Needed (leg pain/cramps).  0   • ondansetron (ZOFRAN) 4 MG tablet Take 4 mg by mouth Every 6 (Six) Hours As Needed for Nausea or Vomiting.     • spironolactone (ALDACTONE) 25 MG tablet  Take 1 tablet by mouth Daily. Hold SBP less than 100     • torsemide (DEMADEX) 20 MG tablet Take 40 mg by mouth Daily.       Allergies   Allergen Reactions   • Sertraline Hallucinations     Weakness   • Risedronate Sodium      Leg cramps   • Sulfa Antibiotics Nausea And Vomiting       Objective    Objective     Vital Signs  Temp:  [97.7 °F (36.5 °C)-98 °F (36.7 °C)] 98 °F (36.7 °C)  Heart Rate:  [80-92] 90  Resp:  [16-18] 16  BP: (117-146)/(60-76) 138/76  SpO2:  [93 %-100 %] 93 %  on  Flow (L/min):  [2] 2;   Device (Oxygen Therapy): room air  There is no height or weight on file to calculate BMI.    Physical Exam  Vitals and nursing note reviewed.   Constitutional:       General: She is not in acute distress.     Appearance: She is ill-appearing. She is not toxic-appearing.      Comments: Sleeping but easily awakens   HENT:      Head: Normocephalic.      Mouth/Throat:      Mouth: Mucous membranes are dry.   Eyes:      Conjunctiva/sclera: Conjunctivae normal.   Cardiovascular:      Rate and Rhythm: Normal rate and regular rhythm.      Heart sounds: Murmur heard.       Pulmonary:      Effort: Pulmonary effort is normal. No respiratory distress.      Breath sounds: Examination of the right-lower field reveals decreased breath sounds. Examination of the left-lower field reveals decreased breath sounds. Decreased breath sounds present.   Abdominal:      General: Bowel sounds are normal.      Palpations: Abdomen is soft.   Musculoskeletal:      Cervical back: Neck supple.      Right lower leg: No edema.      Left lower leg: No edema.   Skin:     General: Skin is warm and dry.   Neurological:      Mental Status: She is disoriented.   Psychiatric:         Behavior: Behavior is not agitated.         Cognition and Memory: Cognition is impaired. Memory is impaired.         Results Review:  I reviewed the patient's new clinical results.  I reviewed the patient's new imaging results and agree with the interpretation.  I reviewed  the patient's other test results and agree with the interpretation  I personally viewed and interpreted the patient's EKG/Telemetry data    Lab Results (last 24 hours)     Procedure Component Value Units Date/Time    CBC & Differential [[id number]]  (Abnormal) Collected: 11/17/21 2307    Specimen: Blood Updated: 11/17/21 2317    Narrative:      The following orders were created for panel order CBC & Differential.  Procedure                               Abnormality         Status                     ---------                               -----------         ------                     CBC Auto Differential[[id number]]        Abnormal            Final result                 Please view results for these tests on the individual orders.    Comprehensive Metabolic Panel [[id number]]  (Abnormal) Collected: 11/17/21 2307    Specimen: Blood Updated: 11/17/21 2332     Glucose 206 mg/dL      BUN 41 mg/dL      Creatinine 1.27 mg/dL      Sodium 138 mmol/L      Potassium 3.9 mmol/L      Chloride 99 mmol/L      CO2 26.4 mmol/L      Calcium 9.6 mg/dL      Total Protein 7.4 g/dL      Albumin 4.20 g/dL      ALT (SGPT) 10 U/L      AST (SGOT) 15 U/L      Alkaline Phosphatase 89 U/L      Total Bilirubin 0.2 mg/dL      eGFR Non African Amer 39 mL/min/1.73      Globulin 3.2 gm/dL      A/G Ratio 1.3 g/dL      BUN/Creatinine Ratio 32.3     Anion Gap 12.6 mmol/L     Narrative:      GFR Normal >60  Chronic Kidney Disease <60  Kidney Failure <15      Magnesium [[id number]]  (Normal) Collected: 11/17/21 2307    Specimen: Blood Updated: 11/17/21 2332     Magnesium 2.3 mg/dL     Digoxin Level [[id number]]  (Normal) Collected: 11/17/21 2307    Specimen: Blood Updated: 11/17/21 2332     Digoxin 0.80 ng/mL     CBC Auto Differential [[id number]]  (Abnormal) Collected: 11/17/21 2307    Specimen: Blood Updated: 11/17/21 2317     WBC 14.19 10*3/mm3      RBC 4.20 10*6/mm3      Hemoglobin 12.9 g/dL      Hematocrit 37.5 %      MCV 89.3 fL      MCH 30.7 pg       MCHC 34.4 g/dL      RDW 14.8 %      RDW-SD 47.8 fl      MPV 10.3 fL      Platelets 193 10*3/mm3      Neutrophil % 71.9 %      Lymphocyte % 18.8 %      Monocyte % 5.1 %      Eosinophil % 2.4 %      Basophil % 0.5 %      Immature Grans % 1.3 %      Neutrophils, Absolute 10.21 10*3/mm3      Lymphocytes, Absolute 2.67 10*3/mm3      Monocytes, Absolute 0.72 10*3/mm3      Eosinophils, Absolute 0.34 10*3/mm3      Basophils, Absolute 0.07 10*3/mm3      Immature Grans, Absolute 0.18 10*3/mm3      nRBC 0.0 /100 WBC     Urinalysis With Microscopic If Indicated (No Culture) - Urine, Catheter [[id number]]  (Normal) Collected: 11/18/21 0006    Specimen: Urine, Catheter Updated: 11/18/21 0018     Color, UA Yellow     Appearance, UA Clear     pH, UA 6.0     Specific Gravity, UA 1.011     Glucose, UA Negative     Ketones, UA Negative     Bilirubin, UA Negative     Blood, UA Negative     Protein, UA Negative     Leuk Esterase, UA Negative     Nitrite, UA Negative     Urobilinogen, UA 0.2 E.U./dL    Narrative:      Urine microscopic not indicated.    COVID PRE-OP / PRE-PROCEDURE SCREENING ORDER (NO ISOLATION) - Swab, Nasopharynx [[id number]] Collected: 11/18/21 0128    Specimen: Swab from Nasopharynx Updated: 11/18/21 0135    Narrative:      The following orders were created for panel order COVID PRE-OP / PRE-PROCEDURE SCREENING ORDER (NO ISOLATION) - Swab, Nasopharynx.  Procedure                               Abnormality         Status                     ---------                               -----------         ------                     COVID-19,APTIMA PANTHER(...[[id number]]                      In process                   Please view results for these tests on the individual orders.    COVID-19,APTIMA PANTHER(KEILA), MORRO, NP/OP SWAB IN UTM/VTM/SALINE TRANSPORT MEDIA,24 HR TAT - Swab, Nasopharynx [[id number]] Collected: 11/18/21 0128    Specimen: Swab from Nasopharynx Updated: 11/18/21 0135    Basic Metabolic Panel [[id number]]   (Abnormal) Collected: 11/18/21 0600    Specimen: Blood Updated: 11/18/21 0705     Glucose 214 mg/dL      BUN 35 mg/dL      Creatinine 1.17 mg/dL      Sodium 140 mmol/L      Potassium 4.4 mmol/L      Chloride 100 mmol/L      CO2 28.6 mmol/L      Calcium 9.3 mg/dL      eGFR Non African Amer 43 mL/min/1.73      BUN/Creatinine Ratio 29.9     Anion Gap 11.4 mmol/L     Narrative:      GFR Normal >60  Chronic Kidney Disease <60  Kidney Failure <15      CBC (No Diff) [[id number]]  (Abnormal) Collected: 11/18/21 0600    Specimen: Blood Updated: 11/18/21 0637     WBC 12.65 10*3/mm3      RBC 4.12 10*6/mm3      Hemoglobin 12.5 g/dL      Hematocrit 37.1 %      MCV 90.0 fL      MCH 30.3 pg      MCHC 33.7 g/dL      RDW 14.6 %      RDW-SD 47.3 fl      MPV 9.9 fL      Platelets 161 10*3/mm3     POC Glucose Once [[id number]]  (Abnormal) Collected: 11/18/21 0621    Specimen: Blood Updated: 11/18/21 0623     Glucose 206 mg/dL      Comment: Meter: [id number] : [id number] Krisyann INIGUEZ             Imaging Results (Last 24 Hours)     Procedure Component Value Units Date/Time    CT Head Without Contrast [[id number]] Collected: 11/18/21 0014     Updated: 11/18/21 0020    Narrative:      CT HEAD WITHOUT CONTRAST     HISTORY: Fall     COMPARISON: 01/13/2019     TECHNIQUE: Axial CT imaging was obtained through the brain. No IV  contrast was administered.     FINDINGS:  No acute intracranial hemorrhage is seen. There is diffuse atrophy.  There is periventricular and deep white matter microangiopathic change.  There is no midline shift or mass effect. Paranasal sinuses and mastoid  air cells appear clear. No calvarial fracture is seen.       Impression:      No acute intracranial findings.     Radiation dose reduction techniques were utilized, including automated  exposure control and exposure modulation based on body size.     This report was finalized on 11/18/2021 12:17 AM by Dr. Aliza Mitchell M.D.       XR Hip With or Without  Pelvis 2 - 3 View Right [[id number]] Collected: 11/18/21 0004     Updated: 11/18/21 0010    Narrative:      AP VIEW THE PELVIS PLUS 2 VIEWS RIGHT HIP     HISTORY: Fall with right hip pain     COMPARISON: 10/08/2017     FINDINGS:  Patient has a right hip arthroplasty. It is able in position when  compared to prior exam from October 2017. There is deformity of the  right inferior pubic ramus, new when compared to prior exam. Appearance  is concerning for acute fracture. I cannot exclude a nondisplaced  fracture of the right superior pubic ramus. No acute fracture or  subluxation of the left hip is seen. There are degenerative changes of  both SI joints, as well as of the lumbosacral spine.       Impression:      Right inferior pubic ramus fracture. Possible nondisplaced fracture of  the right superior pubic ramus.     This report was finalized on 11/18/2021 12:07 AM by Dr. Aliza Mitchell M.D.             Results for orders placed during the hospital encounter of 04/29/17    Adult Transthoracic Echo Complete With Contrast    Interpretation Summary  · The left ventricular cavity is borderline dilated.  · Left ventricular wall thickness is consistent with a thin walled ventricle.  · Right ventricular cavity is mildly dilated.  · Severely reduced right ventricular systolic function noted.  · Moderate-to-severe mitral valve regurgitation is present  · Moderate aortic valve stenosis is present.  · Mild to moderate tricuspid valve regurgitation is present.  · Left ventricular function is severely decreased. Estimated EF = 22%.  · Mild pulmonic valve regurgitation is present.  · Left atrial cavity size is moderately dilated.      No orders to display        Assessment/Plan     Active Hospital Problems    Diagnosis  POA   • **Closed fracture of right inferior pubic ramus ([facility]) [S32.591A]  Yes   • History of non-Hodgkin's lymphoma [Z85.72]  Not Applicable   • Dementia without behavioral disturbance ([facility]) [F03.90]  Yes   •  Type 2 diabetes mellitus, without long-term current use of insulin (HCC) [E11.9]  Yes   • Acute kidney injury (HCC) [N17.9]  Yes   • Essential hypertension [I10]  Yes   • Chronic combined systolic and diastolic heart failure (HCC) [I50.42]  Yes      Resolved Hospital Problems   No resolved problems to display.       Ms. Keith is a 89 y.o. female with a history of CHF, CAD, DM, lymphoma, dementia, GERD, HTN who is admitted for fall resulting in rt pubic ramus fx, MARV    -Ortho consult. Pain control. PT/OT eval when appopriate  -Cr improved today 1.17. Baseline 1-1.1. Avoid nephrotoxic meds. Monitor urinary output. IC if needed. UA neg. Gentle IVF's today  -BP stable. Denies chest pain, CHF symptoms. Last Echo 2017  -Monitor BG trends. Not prescribed medications. Check A1C (10.95% in 2017). Correctional scale insulin. Glucose 200s on chem panel  -WBC 14 on admission, likely reactive. Down to 12. Afebrile. Monitor  -Home meds will be reconciled when available; d/w RN    I discussed the patient's findings and my recommendations with patient, nursing staff and Dr. Nuno.    VTE Prophylaxis - SCDs.  Code Status - Full code unless otherwise specified.       NIKI Camargo  [facility]  11/18/21  08:22 EST      Electronically signed by Kathy Ngo APRN at 11/18/21 1238         Current Facility-Administered Medications   Medication Dose Route Frequency Provider Last Rate Last Admin   • acetaminophen (TYLENOL) tablet 650 mg  650 mg Oral Q4H PRN Laura Prado APRN        Or   • acetaminophen (TYLENOL) 160 MG/5ML solution 650 mg  650 mg Oral Q4H PRN Laura Prado APRN        Or   • acetaminophen (TYLENOL) suppository 650 mg  650 mg Rectal Q4H PRN Laura Prado APRN       • allopurinol (ZYLOPRIM) tablet 100 mg  100 mg Oral Daily Kathy Ngo APRN       • bisacodyl (DULCOLAX) suppository 10 mg  10 mg Rectal Daily PRN Gilda Nuno, DO       •  calcium carbonate (TUMS) chewable tablet 500 mg (200 mg elemental)  2 tablet Oral BID PRN Laura Prado APRN       • [START ON 11/19/2021] digoxin (LANOXIN) tablet 125 mcg  125 mcg Oral Daily Kathy Ngo APRN       • HYDROcodone-acetaminophen (NORCO) 5-325 MG per tablet 1 tablet  1 tablet Oral Q6H PRN Laura Prado APRN       • Magnesium Sulfate 2 gram Bolus, followed by 8 gram infusion (total Mg dose 10 grams)- Mg less than or equal to 1mg/dL  2 g Intravenous PRN Gilda Nuno DO        Or   • Magnesium Sulfate 2 gram / 50mL Infusion (GIVE X 3 BAGS TO EQUAL 6GM TOTAL DOSE) - Mg 1.1 - 1.5 mg/dl  2 g Intravenous PRN Gilda Nuno DO        Or   • Magnesium Sulfate 4 gram infusion- Mg 1.6-1.9 mg/dL  4 g Intravenous PRN Gilda Nuno DO       • melatonin tablet 5 mg  5 mg Oral Nightly PRN Gilda Nuno DO       • melatonin tablet 5 mg  5 mg Oral Nightly Kathy Ngo APRN       • metoprolol succinate XL (TOPROL-XL) 24 hr tablet 25 mg  25 mg Oral Daily Kathy Ngo APRN       • mirtazapine (REMERON) tablet 7.5 mg  7.5 mg Oral Nightly Kathy Ngo APRN       • ondansetron (ZOFRAN) tablet 4 mg  4 mg Oral Q6H PRN Laura Prado APRN        Or   • ondansetron (ZOFRAN) injection 4 mg  4 mg Intravenous Q6H PRN Laura Prado APRN       • polyethylene glycol (MIRALAX) packet 17 g  17 g Oral Daily PRN Gilda Nuno DO       • potassium chloride (K-DUR,KLOR-CON) ER tablet 40 mEq  40 mEq Oral PRN Gilda Nuno DO       • potassium chloride (KLOR-CON) packet 40 mEq  40 mEq Oral PRN Gilda Nuno DO       • potassium phosphate 45 mmol in sodium chloride 0.9 % 500 mL infusion  45 mmol Intravenous PRN Gilda Nuno DO        Or   • potassium phosphate 30 mmol in sodium chloride 0.9 % 250 mL infusion  30 mmol Intravenous PRN Gilda Nuno, DO        Or   • potassium phosphate 15 mmol in sodium chloride 0.9 % 100 mL  infusion  15 mmol Intravenous PRN Gilda Nuno DO        Or   • sodium phosphates 40 mmol in sodium chloride 0.9 % 500 mL IVPB  40 mmol Intravenous PRN Gilda Nuno DO        Or   • sodium phosphates 20 mmol in sodium chloride 0.9 % 250 mL IVPB  20 mmol Intravenous PRN Gilda Nuno DO       • sacubitril-valsartan (ENTRESTO) 24-26 MG tablet 1 tablet  1 tablet Oral BID Kathy Ngo APRN       • sennosides-docusate (PERICOLACE) 8.6-50 MG per tablet 2 tablet  2 tablet Oral Daily Gilda Nuno DO       • sodium chloride 0.9 % flush 10 mL  10 mL Intravenous Q12H Laura Prado APRN   10 mL at 11/18/21 1323   • sodium chloride 0.9 % flush 10 mL  10 mL Intravenous PRN Laura Prado APRN       • sodium chloride 0.9 % infusion  75 mL/hr Intravenous Continuous Kathy Ngo APRN 75 mL/hr at 11/18/21 1319 75 mL/hr at 11/18/21 1319       Consult Notes (last 48 hours)  Notes from 11/16/21 1508 through 11/18/21 1508   No notes of this type exist for this encounter.          Physical Therapy Notes (last 48 hours)      Kendra Lubin, PT at 11/18/21 1426  Version 1 of 1       Goal Outcome Evaluation:  Plan of Care Reviewed With: patient, daughter           Outcome Summary: Pt is a 89 y/o F admitted with pelvic fx. Pt arrives from facility where she was able to ambulate with RW, but req'd assist with dressing, toileting, bathing, and cooking d/t dementia. Pt's daughter was present during initial evaluation, states last time pt was in the hospital, pt had a difficult time participating in PT d/t not asking for pain meds. RN present for this conversation. Pt was able to tolerate bed mobility this date, mod/maxA x 2 for sup<>sit and sit<>sup. Pt was able to sit EOB for ~4-5 min and perform LE exercises. Started to fatigue and returned to supine position. Pt is WBAT, but unsafe to attempt ambulation this date. Pt demo's dec'd strength, endurance, balance, and inc'd pain. Pt's  daughter states the facility that pt came from will be able to do PT. Recommending D/C back to facility with PT or SNF.    Electronically signed by Kendra Lubin, PT at 21     Kendra Lubin, PT at 21  Version 1 of 1         Patient Name: Jada Keith  : 4/10/1926    MRN: [id number]                              Today's Date: 2021       Admit Date: 2021    Visit Dx:     ICD-10-CM ICD-9-CM   1. Closed fracture of right inferior pubic ramus, initial encounter ([facility])  S32.591A 808.2   2. Fall at nursing home, initial encounter  W19.XXXA E888.9    Y92.129 E849.7   3. MARV (acute kidney injury) ([facility])  N17.9 584.9   4. Urinary retention  R33.9 788.20   5. Dementia without behavioral disturbance, unspecified dementia type ([facility])  F03.90 294.20   6. Type 2 diabetes mellitus without complication, without long-term current use of insulin ([facility])  E11.9 250.00   7. Congestive heart failure, unspecified HF chronicity, unspecified heart failure type ([facility])  I50.9 428.0   8. 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   • Chronic combined systolic and diastolic heart failure ([facility])   • Collapsed vertebra, not elsewhere classified, thoracic region, initial encounter for fracture ([facility])   • Diastolic dysfunction   • Diffuse non-Hodgkin's lymphoblastic lymphoma ([facility])   • Diverticulosis of intestine   • Uterine leiomyoma   • Gastroesophageal reflux disease   • Memory loss   • Moderate mitral regurgitation   • Obstructive sleep apnea syndrome   • Osteoporosis with pathological fracture   • Paroxysmal supraventricular tachycardia ([facility])   • Rectal disorder   • Sinus tachycardia   • Acute systolic (congestive) heart failure   • Acute pulmonary edema ([facility])   • Pedal edema   • Acute respiratory failure with hypoxemia ([facility])   • Pleural effusion on right   • Essential hypertension   • Acute  kidney injury (HCC)   • Closed subcapital fracture of right femur (HCC)   • Leukocytosis   • Type 2 diabetes mellitus, without long-term current use of insulin (HCC)   • Hyponatremia   • DNR (do not resuscitate)   • Heel ulcer, right, with unspecified severity (HCC)   • DM (diabetes mellitus) (HCC)   • Closed fracture of right inferior pubic ramus (HCC)   • History of non-Hodgkin's lymphoma   • Dementia without behavioral disturbance (HCC)     Past Medical History:   Diagnosis Date   • Arthritis    • CHF (congestive heart failure) (HCC)    • Coronary artery disease    • DM type 2 (diabetes mellitus, type 2), new diagnosis 10/8/2017   • GERD (gastroesophageal reflux disease)    • History of transfusion    • Hypertension    • Lymphoma (HCC)    • Status post chemotherapy      Past Surgical History:   Procedure Laterality Date   • ABDOMINAL SURGERY      tumor removals due to cancer   • APPENDECTOMY     • BACK SURGERY     • CARDIAC CATHETERIZATION     • CHOLECYSTECTOMY     • EYE SURGERY      cataract surgery   • FRACTURE SURGERY      right hip   • HYSTERECTOMY     • JOINT REPLACEMENT      right hip   • PA PARTIAL HIP REPLACEMENT Right 10/8/2017    Procedure: CEMENTED BIPOLAR;  Surgeon: Michael England MD;  Location: [facility];  Service: Orthopedics   • SKIN BIOPSY     • TONSILLECTOMY     • TUMOR REMOVAL        General Information     Row Name 11/18/21 1414          Physical Therapy Time and Intention    Document Type evaluation  -DB     Mode of Treatment individual therapy; physical therapy  -DB     Row Name 11/18/21 1414          General Information    Patient Profile Reviewed yes  -DB     Prior Level of Function max assist:; ADL's; grooming; dressing; bathing  -DB     Existing Precautions/Restrictions fall  -DB     Barriers to Rehab cognitive status  -DB     Row Name 11/18/21 1414          Living Environment    Lives With facility resident  -DB     Row Name 11/18/21 1414          Home Main Entrance    Number of  Stairs, Main Entrance none  -DB     Row Name 11/18/21 1414          Stairs Within Home, Primary    Number of Stairs, Within Home, Primary none  -DB     Row Name 11/18/21 1414          Cognition    Orientation Status (Cognition) unable/difficult to assess  -DB     Row Name 11/18/21 1414          Safety Issues, Functional Mobility    Impairments Affecting Function (Mobility) balance; cognition; strength; pain; postural/trunk control; endurance/activity tolerance  -DB     Cognitive Impairments, Mobility Safety/Performance sequencing abilities; attention  -DB           User Key  (r) = Recorded By, (t) = Taken By, (c) = Cosigned By    Initials Name Provider Type    DB Kendra Lubin PT Physical Therapist               Mobility     Row Name 11/18/21 1417          Bed Mobility    Bed Mobility supine-sit; sit-supine; scooting/bridging  -DB     Scooting/Bridging [place] (Bed Mobility) dependent (less than 25% patient effort); 2 person assist; verbal cues  -DB     Supine-Sit [place] (Bed Mobility) moderate assist (50% patient effort); maximum assist (25% patient effort); verbal cues; nonverbal cues (demo/gesture); 2 person assist  -DB     Sit-Supine [place] (Bed Mobility) moderate assist (50% patient effort); maximum assist (25% patient effort); verbal cues; nonverbal cues (demo/gesture); 2 person assist  -DB     Assistive Device (Bed Mobility) bed rails; head of bed elevated; draw sheet  -DB     Row Name 11/18/21 1417          Sit-Stand Transfer    Sit-Stand [place] (Transfers) unable to assess  -DB     Row Name 11/18/21 1417          Gait/Stairs (Locomotion)    [place] Level (Gait) unable to assess  -DB           User Key  (r) = Recorded By, (t) = Taken By, (c) = Cosigned By    Initials Name Provider Type    DB Kendra Lubin PT Physical Therapist               Obj/Interventions     Row Name 11/18/21 1418          Range of Motion Comprehensive    General Range of Motion no range of motion  deficits identified  -DB     Row Name 11/18/21 1418          Strength Comprehensive (MMT)    Comment, General Manual Muscle Testing (MMT) Assessment BLE > 3/5 MMT  -DB     Row Name 11/18/21 1418          Motor Skills    Therapeutic Exercise --  seated LAQ, AP  -DB     Row Name 11/18/21 1418          Balance    Balance Assessment sitting static balance; sitting dynamic balance  -DB     Static Sitting Balance mild impairment; sitting, edge of bed  -DB     Dynamic Sitting Balance mild impairment; sitting, edge of bed  -DB     Balance Interventions sitting  -DB     Comment, Balance able to sit EOB for ~4-5 min  -DB           User Key  (r) = Recorded By, (t) = Taken By, (c) = Cosigned By    Initials Name Provider Type    DB Kendra Lubin, PT Physical Therapist               Goals/Plan     Row Name 11/18/21 1424          Bed Mobility Goal 1 (PT)    Activity/Assistive Device (Bed Mobility Goal 1, PT) bed mobility activities, all  -DB     [place] Level/Cues Needed (Bed Mobility Goal 1, PT) minimum assist (75% or more patient effort)  -DB     Time Frame (Bed Mobility Goal 1, PT) 1 week  -DB     Row Name 11/18/21 1424          Transfer Goal 1 (PT)    Activity/Assistive Device (Transfer Goal 1, PT) transfers, all  -DB     [place] Level/Cues Needed (Transfer Goal 1, PT) minimum assist (75% or more patient effort)  -DB     Time Frame (Transfer Goal 1, PT) 1 week  -DB     Row Name 11/18/21 1424          Gait Training Goal 1 (PT)    Activity/Assistive Device (Gait Training Goal 1, PT) gait (walking locomotion)  -DB     [place] Level (Gait Training Goal 1, PT) minimum assist (75% or more patient effort)  -DB     Time Frame (Gait Training Goal 1, PT) 1 week  -DB           User Key  (r) = Recorded By, (t) = Taken By, (c) = Cosigned By    Initials Name Provider Type    DB Kendra Lubin, PT Physical Therapist               Clinical Impression     Row Name 11/18/21 1419          Plan of Care Review    Plan of Care  Reviewed With patient; daughter  -DB     Outcome Summary Pt is a 89 y/o F admitted with pelvic fx. Pt arrives from facility where she was able to ambulate with RW, but req'd assist with dressing, toileting, bathing, and cooking d/t dementia. Pt's daughter was present during initial evaluation, states last time pt was in the hospital, pt had a difficult time participating in PT d/t not asking for pain meds. RN present for this conversation. Pt was able to tolerate bed mobility this date, mod/maxA x 2 for sup<>sit and sit<>sup. Pt was able to sit EOB for ~4-5 min and perform LE exercises. Started to fatigue and returned to supine position. Pt is WBAT, but unsafe to attempt ambulation this date. Pt demo's dec'd strength, endurance, balance, and inc'd pain. Pt's daughter states the facility that pt came from will be able to do PT. Recommending D/C back to facility with PT or SNF.  -DB     Row Name 11/18/21 1419          Therapy Assessment/Plan (PT)    Rehab Potential (PT) good, to achieve stated therapy goals  -DB     Criteria for Skilled Interventions Met (PT) yes  -DB     Row Name 11/18/21 1419          Vital Signs    O2 Delivery Pre Treatment room air  -DB     O2 Delivery Intra Treatment room air  -DB     O2 Delivery Post Treatment room air  -DB     Pre Patient Position Supine  -DB     Intra Patient Position Sitting  -DB     Post Patient Position Supine  -DB     Row Name 11/18/21 1419          Positioning and Restraints    Pre-Treatment Position in bed  -DB     Post Treatment Position bed  -DB     In Bed call light within reach; encouraged to call for assist; exit alarm on; with family/caregiver; side lying right  -DB           User Key  (r) = Recorded By, (t) = Taken By, (c) = Cosigned By    Initials Name Provider Type    Kendra Chan PT Physical Therapist               Outcome Measures     Row Name 11/18/21 1609          How much help from another person do you currently need...    Turning from your back to  your side while in flat bed without using bedrails? 2  -DB     Moving from lying on back to sitting on the side of a flat bed without bedrails? 2  -DB     Moving to and from a bed to a chair (including a wheelchair)? 1  -DB     Standing up from a chair using your arms (e.g., wheelchair, bedside chair)? 1  -DB     Climbing 3-5 steps with a railing? 1  -DB     To walk in hospital room? 1  -DB     AM-PAC 6 Clicks Score (PT) 8  -DB     Row Name 11/18/21 1425          Functional Assessment    Outcome Measure Options AM-PAC 6 Clicks Basic Mobility (PT)  -DB           User Key  (r) = Recorded By, (t) = Taken By, (c) = Cosigned By    Initials Name Provider Type    DB Kendra Lubin PT Physical Therapist                             Physical Therapy Education                 Title: PT OT SLP Therapies (Done)     Topic: Physical Therapy (In Progress)     Point: Mobility training (In Progress)     Learning Progress Summary           Patient Acceptance, E, NR by DB at 11/18/2021 1426   Family Acceptance, E, VU by DB at 11/18/2021 1425                   Point: Home exercise program (In Progress)     Learning Progress Summary           Patient Acceptance, E, NR by DB at 11/18/2021 1426   Family Acceptance, E, VU by DB at 11/18/2021 1425                   Point: Body mechanics (In Progress)     Learning Progress Summary           Patient Acceptance, E, NR by DB at 11/18/2021 1426   Family Acceptance, E, VU by DB at 11/18/2021 1425                   Point: Precautions (In Progress)     Learning Progress Summary           Patient Acceptance, E, NR by DB at 11/18/2021 1426   Family Acceptance, E, VU by DB at 11/18/2021 1425                               User Key     Initials Effective Dates Name Provider Type Discipline    DB 06/16/21 -  Kendra Lubin PT Physical Therapist PT              PT Recommendation and Plan  Planned Therapy Interventions (PT): balance training, bed mobility training, gait training, home exercise  program, postural re-education, patient/family education, neuromuscular re-education, stair training, strengthening, transfer training  Plan of Care Reviewed With: patient, daughter  Outcome Summary: Pt is a 89 y/o F admitted with pelvic fx. Pt arrives from facility where she was able to ambulate with RW, but req'd assist with dressing, toileting, bathing, and cooking d/t dementia. Pt's daughter was present during initial evaluation, states last time pt was in the hospital, pt had a difficult time participating in PT d/t not asking for pain meds. RN present for this conversation. Pt was able to tolerate bed mobility this date, mod/maxA x 2 for sup<>sit and sit<>sup. Pt was able to sit EOB for ~4-5 min and perform LE exercises. Started to fatigue and returned to supine position. Pt is WBAT, but unsafe to attempt ambulation this date. Pt demo's dec'd strength, endurance, balance, and inc'd pain. Pt's daughter states the facility that pt came from will be able to do PT. Recommending D/C back to facility with PT or SNF.     Time Calculation:    PT Charges     Row Name 11/18/21 1426             Time Calculation    Start Time 1318  -DB      Stop Time 1335  -DB      Time Calculation (min) 17 min  -DB      PT Received On 11/18/21  -DB      PT - Next Appointment 11/19/21  -DB      PT Goal Re-Cert Due Date 11/25/21  -DB              Time Calculation- PT    Total Timed Code Minutes- PT 8 minute(s)  -DB            User Key  (r) = Recorded By, (t) = Taken By, (c) = Cosigned By    Initials Name Provider Type    DB Kendra Lubin PT Physical Therapist              Therapy Charges for Today     Code Description Service Date Service Provider Modifiers Qty    26810376474 HC PT EVAL MOD COMPLEXITY 2 11/18/2021 Kendra Lubin, PT GP 1    98898498085 HC PT THER PROC EA 15 MIN 11/18/2021 Kendra Lubin PT GP 1          PT G-Codes  Outcome Measure Options: AM-PAC 6 Clicks Basic Mobility (PT)  AM-PAC 6 Clicks Score (PT):  8    Kendra Lubin, PT  11/18/2021      Electronically signed by Kendra Lubin, PT at 11/18/21 8244

## 2021-11-18 NOTE — PLAN OF CARE
Goal Outcome Evaluation:  Plan of Care Reviewed With: patient, daughter           Outcome Summary: Pt is a 89 y/o F admitted with pelvic fx. Pt arrives from facility where she was able to ambulate with RW, but req'd assist with dressing, toileting, bathing, and cooking d/t dementia. Pt's daughter was present during initial evaluation, states last time pt was in the hospital, pt had a difficult time participating in PT d/t not asking for pain meds. RN present for this conversation. Pt was able to tolerate bed mobility this date, mod/maxA x 2 for sup<>sit and sit<>sup. Pt was able to sit EOB for ~4-5 min and perform LE exercises. Started to fatigue and returned to supine position. Pt is WBAT, but unsafe to attempt ambulation this date. Pt demo's dec'd strength, endurance, balance, and inc'd pain. Pt's daughter states the facility that pt came from will be able to do PT. Recommending D/C back to facility with PT or SNF.

## 2021-11-18 NOTE — H&P
Patient Name:  Jada Keith  YOB: 1926  MRN:  [id number]  Admit Date:  11/17/2021  Patient Care Team:  Angela Cantu APRN as PCP - General (Family Medicine)      Subjective   History Present Illness     Chief Complaint   Patient presents with   • Fall       Ms. Keith is a 89 y.o. female with a history of CHF, CAD, DM, lymphoma, dementia, GERD, HTN that presents to [facility] complaining of rt hip pain. Pt sustained a fall at the nursing facility resulting in rt hip pain and inability to ambulate. Denies hitting her head but she is not a good historian; thought she was in the hospital because she hasn't been eating. Xrays showed rt inferior pubic ramus fx & possible nondisplaced fx of rt superior pubic ramus. CTH was negative. She had mild MARV w/Cr up to 1.27 and urinary retention, IC'd for 725 ml. On exam, denies pain, shortness of breath, nausea, abd pain/discomfort    Afebrile. HR controlled. BP stable. On room air. WBC 14, Hgb 12.9. Dig 0.80. Mg 2.3. Gluc 206, BUN/Cr 41/1.27. Covid pending. UA neg.     Review of Systems   Unable to perform ROS: Other (as above; full ROS unable to be performed due to dementia)        Personal History     Past Medical History:   Diagnosis Date   • Arthritis    • CHF (congestive heart failure) (HCC)    • Coronary artery disease    • DM type 2 (diabetes mellitus, type 2), new diagnosis 10/8/2017   • GERD (gastroesophageal reflux disease)    • History of transfusion    • Hypertension    • Lymphoma (HCC)    • Status post chemotherapy      Past Surgical History:   Procedure Laterality Date   • ABDOMINAL SURGERY      tumor removals due to cancer   • APPENDECTOMY     • BACK SURGERY     • CARDIAC CATHETERIZATION     • CHOLECYSTECTOMY     • EYE SURGERY      cataract surgery   • FRACTURE SURGERY      right hip   • HYSTERECTOMY     • JOINT REPLACEMENT      right hip   • VT PARTIAL HIP REPLACEMENT Right 10/8/2017    Procedure: CEMENTED BIPOLAR;   Surgeon: Michael England MD;  Location: [facility] OR;  Service: Orthopedics   • SKIN BIOPSY     • TONSILLECTOMY     • TUMOR REMOVAL       Family History   Family history unknown: Yes     Social History     Tobacco Use   • Smoking status: Never Smoker   • Smokeless tobacco: Never Used   Substance Use Topics   • Alcohol use: Yes     Comment: once a year   • Drug use: No     No current facility-administered medications on file prior to encounter.     Current Outpatient Medications on File Prior to Encounter   Medication Sig Dispense Refill   • aspirin 81 MG EC tablet Take 1 tablet by mouth Daily.     • calcium citrate-vitamin d (CITRACAL) 200-250 MG-UNIT tablet tablet Take 1 tablet by mouth Daily.     • digoxin (LANOXIN) 125 MCG tablet Take 1 tablet by mouth Daily. Hold pulse less than 65     • donepezil (ARICEPT) 10 MG tablet Take 10 mg by mouth Every Night.     • enalapril (VASOTEC) 2.5 MG tablet Take 8 tablets by mouth Daily. (Patient taking differently: Take 5 mg by mouth Daily.)     • furosemide (LASIX) 40 MG tablet Take 0.5 tablets by mouth Daily. Hold SBP less than 100 (Patient taking differently: Take 40 mg by mouth 2 (Two) Times a Day. Hold SBP less than 100)     • insulin aspart (novoLOG) 100 UNIT/ML injection Inject 0-7 Units under the skin 4 (Four) Times a Day With Meals & at Bedtime.  12   • ivabradine HCl (CORLANOR) 5 MG tablet tablet Take 7.5 mg by mouth Daily With Breakfast & Dinner.     • metoprolol succinate XL (TOPROL-XL) 100 MG 24 hr tablet Take 0.5 tablets by mouth Daily. Hold pulse less than 65 or SBP less than 100 (Patient taking differently: Take 25 mg by mouth Daily. Hold pulse less than 65 or SBP less than 100)     • muscle rub (Merrill-Loo) 10-15 % cream cream Apply  topically Every 1 (One) Hour As Needed (leg pain/cramps).  0   • ondansetron (ZOFRAN) 4 MG tablet Take 4 mg by mouth Every 6 (Six) Hours As Needed for Nausea or Vomiting.     • spironolactone (ALDACTONE) 25 MG tablet Take 1 tablet  by mouth Daily. Hold SBP less than 100     • torsemide (DEMADEX) 20 MG tablet Take 40 mg by mouth Daily.       Allergies   Allergen Reactions   • Sertraline Hallucinations     Weakness   • Risedronate Sodium      Leg cramps   • Sulfa Antibiotics Nausea And Vomiting       Objective    Objective     Vital Signs  Temp:  [97.7 °F (36.5 °C)-98 °F (36.7 °C)] 98 °F (36.7 °C)  Heart Rate:  [80-92] 90  Resp:  [16-18] 16  BP: (117-146)/(60-76) 138/76  SpO2:  [93 %-100 %] 93 %  on  Flow (L/min):  [2] 2;   Device (Oxygen Therapy): room air  There is no height or weight on file to calculate BMI.    Physical Exam  Vitals and nursing note reviewed.   Constitutional:       General: She is not in acute distress.     Appearance: She is ill-appearing. She is not toxic-appearing.      Comments: Sleeping but easily awakens   HENT:      Head: Normocephalic.      Mouth/Throat:      Mouth: Mucous membranes are dry.   Eyes:      Conjunctiva/sclera: Conjunctivae normal.   Cardiovascular:      Rate and Rhythm: Normal rate and regular rhythm.      Heart sounds: Murmur heard.       Pulmonary:      Effort: Pulmonary effort is normal. No respiratory distress.      Breath sounds: Examination of the right-lower field reveals decreased breath sounds. Examination of the left-lower field reveals decreased breath sounds. Decreased breath sounds present.   Abdominal:      General: Bowel sounds are normal.      Palpations: Abdomen is soft.   Musculoskeletal:      Cervical back: Neck supple.      Right lower leg: No edema.      Left lower leg: No edema.   Skin:     General: Skin is warm and dry.   Neurological:      Mental Status: She is disoriented.   Psychiatric:         Behavior: Behavior is not agitated.         Cognition and Memory: Cognition is impaired. Memory is impaired.         Results Review:  I reviewed the patient's new clinical results.  I reviewed the patient's new imaging results and agree with the interpretation.  I reviewed the patient's  other test results and agree with the interpretation  I personally viewed and interpreted the patient's EKG/Telemetry data    Lab Results (last 24 hours)     Procedure Component Value Units Date/Time    CBC & Differential [[id number]]  (Abnormal) Collected: 11/17/21 2307    Specimen: Blood Updated: 11/17/21 2317    Narrative:      The following orders were created for panel order CBC & Differential.  Procedure                               Abnormality         Status                     ---------                               -----------         ------                     CBC Auto Differential[[id number]]        Abnormal            Final result                 Please view results for these tests on the individual orders.    Comprehensive Metabolic Panel [[id number]]  (Abnormal) Collected: 11/17/21 2307    Specimen: Blood Updated: 11/17/21 2332     Glucose 206 mg/dL      BUN 41 mg/dL      Creatinine 1.27 mg/dL      Sodium 138 mmol/L      Potassium 3.9 mmol/L      Chloride 99 mmol/L      CO2 26.4 mmol/L      Calcium 9.6 mg/dL      Total Protein 7.4 g/dL      Albumin 4.20 g/dL      ALT (SGPT) 10 U/L      AST (SGOT) 15 U/L      Alkaline Phosphatase 89 U/L      Total Bilirubin 0.2 mg/dL      eGFR Non African Amer 39 mL/min/1.73      Globulin 3.2 gm/dL      A/G Ratio 1.3 g/dL      BUN/Creatinine Ratio 32.3     Anion Gap 12.6 mmol/L     Narrative:      GFR Normal >60  Chronic Kidney Disease <60  Kidney Failure <15      Magnesium [[id number]]  (Normal) Collected: 11/17/21 2307    Specimen: Blood Updated: 11/17/21 2332     Magnesium 2.3 mg/dL     Digoxin Level [[id number]]  (Normal) Collected: 11/17/21 2307    Specimen: Blood Updated: 11/17/21 2332     Digoxin 0.80 ng/mL     CBC Auto Differential [[id number]]  (Abnormal) Collected: 11/17/21 2307    Specimen: Blood Updated: 11/17/21 2317     WBC 14.19 10*3/mm3      RBC 4.20 10*6/mm3      Hemoglobin 12.9 g/dL      Hematocrit 37.5 %      MCV 89.3 fL      MCH 30.7 pg      MCHC 34.4  g/dL      RDW 14.8 %      RDW-SD 47.8 fl      MPV 10.3 fL      Platelets 193 10*3/mm3      Neutrophil % 71.9 %      Lymphocyte % 18.8 %      Monocyte % 5.1 %      Eosinophil % 2.4 %      Basophil % 0.5 %      Immature Grans % 1.3 %      Neutrophils, Absolute 10.21 10*3/mm3      Lymphocytes, Absolute 2.67 10*3/mm3      Monocytes, Absolute 0.72 10*3/mm3      Eosinophils, Absolute 0.34 10*3/mm3      Basophils, Absolute 0.07 10*3/mm3      Immature Grans, Absolute 0.18 10*3/mm3      nRBC 0.0 /100 WBC     Urinalysis With Microscopic If Indicated (No Culture) - Urine, Catheter [[id number]]  (Normal) Collected: 11/18/21 0006    Specimen: Urine, Catheter Updated: 11/18/21 0018     Color, UA Yellow     Appearance, UA Clear     pH, UA 6.0     Specific Gravity, UA 1.011     Glucose, UA Negative     Ketones, UA Negative     Bilirubin, UA Negative     Blood, UA Negative     Protein, UA Negative     Leuk Esterase, UA Negative     Nitrite, UA Negative     Urobilinogen, UA 0.2 E.U./dL    Narrative:      Urine microscopic not indicated.    COVID PRE-OP / PRE-PROCEDURE SCREENING ORDER (NO ISOLATION) - Swab, Nasopharynx [[id number]] Collected: 11/18/21 0128    Specimen: Swab from Nasopharynx Updated: 11/18/21 0135    Narrative:      The following orders were created for panel order COVID PRE-OP / PRE-PROCEDURE SCREENING ORDER (NO ISOLATION) - Swab, Nasopharynx.  Procedure                               Abnormality         Status                     ---------                               -----------         ------                     COVID-19,APTIMA PANTHER(...[[id number]]                      In process                   Please view results for these tests on the individual orders.    COVID-19,APTIMA PANTHER(KEILA), MORRO, NP/OP SWAB IN UTM/VTM/SALINE TRANSPORT MEDIA,24 HR TAT - Swab, Nasopharynx [[id number]] Collected: 11/18/21 0128    Specimen: Swab from Nasopharynx Updated: 11/18/21 0135    Basic Metabolic Panel [[id number]]  (Abnormal)  Collected: 11/18/21 0600    Specimen: Blood Updated: 11/18/21 0705     Glucose 214 mg/dL      BUN 35 mg/dL      Creatinine 1.17 mg/dL      Sodium 140 mmol/L      Potassium 4.4 mmol/L      Chloride 100 mmol/L      CO2 28.6 mmol/L      Calcium 9.3 mg/dL      eGFR Non African Amer 43 mL/min/1.73      BUN/Creatinine Ratio 29.9     Anion Gap 11.4 mmol/L     Narrative:      GFR Normal >60  Chronic Kidney Disease <60  Kidney Failure <15      CBC (No Diff) [[id number]]  (Abnormal) Collected: 11/18/21 0600    Specimen: Blood Updated: 11/18/21 0637     WBC 12.65 10*3/mm3      RBC 4.12 10*6/mm3      Hemoglobin 12.5 g/dL      Hematocrit 37.1 %      MCV 90.0 fL      MCH 30.3 pg      MCHC 33.7 g/dL      RDW 14.6 %      RDW-SD 47.3 fl      MPV 9.9 fL      Platelets 161 10*3/mm3     POC Glucose Once [[id number]]  (Abnormal) Collected: 11/18/21 0621    Specimen: Blood Updated: 11/18/21 0623     Glucose 206 mg/dL      Comment: Meter: [id number] : [id number] Dubois Marlochristine INIGUEZ             Imaging Results (Last 24 Hours)     Procedure Component Value Units Date/Time    CT Head Without Contrast [[id number]] Collected: 11/18/21 0014     Updated: 11/18/21 0020    Narrative:      CT HEAD WITHOUT CONTRAST     HISTORY: Fall     COMPARISON: 01/13/2019     TECHNIQUE: Axial CT imaging was obtained through the brain. No IV  contrast was administered.     FINDINGS:  No acute intracranial hemorrhage is seen. There is diffuse atrophy.  There is periventricular and deep white matter microangiopathic change.  There is no midline shift or mass effect. Paranasal sinuses and mastoid  air cells appear clear. No calvarial fracture is seen.       Impression:      No acute intracranial findings.     Radiation dose reduction techniques were utilized, including automated  exposure control and exposure modulation based on body size.     This report was finalized on 11/18/2021 12:17 AM by Dr. Aliza Mitchell M.D.       XR Hip With or Without Pelvis 2 - 3  View Right [[id number]] Collected: 11/18/21 0004     Updated: 11/18/21 0010    Narrative:      AP VIEW THE PELVIS PLUS 2 VIEWS RIGHT HIP     HISTORY: Fall with right hip pain     COMPARISON: 10/08/2017     FINDINGS:  Patient has a right hip arthroplasty. It is able in position when  compared to prior exam from October 2017. There is deformity of the  right inferior pubic ramus, new when compared to prior exam. Appearance  is concerning for acute fracture. I cannot exclude a nondisplaced  fracture of the right superior pubic ramus. No acute fracture or  subluxation of the left hip is seen. There are degenerative changes of  both SI joints, as well as of the lumbosacral spine.       Impression:      Right inferior pubic ramus fracture. Possible nondisplaced fracture of  the right superior pubic ramus.     This report was finalized on 11/18/2021 12:07 AM by Dr. Aliza Mitchell M.D.             Results for orders placed during the hospital encounter of 04/29/17    Adult Transthoracic Echo Complete With Contrast    Interpretation Summary  · The left ventricular cavity is borderline dilated.  · Left ventricular wall thickness is consistent with a thin walled ventricle.  · Right ventricular cavity is mildly dilated.  · Severely reduced right ventricular systolic function noted.  · Moderate-to-severe mitral valve regurgitation is present  · Moderate aortic valve stenosis is present.  · Mild to moderate tricuspid valve regurgitation is present.  · Left ventricular function is severely decreased. Estimated EF = 22%.  · Mild pulmonic valve regurgitation is present.  · Left atrial cavity size is moderately dilated.      No orders to display        Assessment/Plan     Active Hospital Problems    Diagnosis  POA   • **Closed fracture of right inferior pubic ramus ([facility]) [S32.591A]  Yes   • History of non-Hodgkin's lymphoma [Z85.72]  Not Applicable   • Dementia without behavioral disturbance ([facility]) [F03.90]  Yes   • Type 2 diabetes  mellitus, without long-term current use of insulin (HCC) [E11.9]  Yes   • Acute kidney injury (HCC) [N17.9]  Yes   • Essential hypertension [I10]  Yes   • Chronic combined systolic and diastolic heart failure (HCC) [I50.42]  Yes      Resolved Hospital Problems   No resolved problems to display.       Ms. Keith is a 89 y.o. female with a history of CHF, CAD, DM, lymphoma, dementia, GERD, HTN who is admitted for fall resulting in rt pubic ramus fx, MARV    -Ortho consult. Pain control. PT/OT eval when appopriate  -Cr improved today 1.17. Baseline 1-1.1. Avoid nephrotoxic meds. Monitor urinary output. IC if needed. UA neg. Gentle IVF's today  -BP stable. Denies chest pain, CHF symptoms. Last Echo 2017  -Monitor BG trends. Not prescribed medications. Check A1C (10.95% in 2017). Correctional scale insulin. Glucose 200s on chem panel  -WBC 14 on admission, likely reactive. Down to 12. Afebrile. Monitor  -Home meds will be reconciled when available; d/w RN    I discussed the patient's findings and my recommendations with patient, nursing staff and Dr. Nuno.    VTE Prophylaxis - SCDs.  Code Status - Full code unless otherwise specified.       NIKI Camargo  [facility]  11/18/21  08:22 EST

## 2021-11-18 NOTE — SIGNIFICANT NOTE
11/18/21 1033   OTHER   Discipline occupational therapist   Rehab Time/Intention   Session Not Performed   (per RN pt not ready for therapy today, also NWB until ortho see.  Will follow up next service date.)   Recommendation   OT - Next Appointment 11/19/21

## 2021-11-18 NOTE — PLAN OF CARE
Goal Outcome Evaluation:  Plan of Care Reviewed With: patient        Progress: improving  Outcome Summary: 89 yo female admitted from ER with R pelvic fx. VSS. Pt on room air. Alert to self only. Will continue to monitor.

## 2021-11-18 NOTE — ED NOTES
This RN was walking by pt in triage and saw c-collar on the floor. JOSIE Blue made aware.      Lesli Ramachandran RN  11/17/21 2722

## 2021-11-18 NOTE — ED PROVIDER NOTES
EMERGENCY DEPARTMENT ENCOUNTER    Room Number:  04/04  Date of encounter:  11/18/2021  PCP: Angela Cantu APRN  Historian: Patient      HPI:  Chief Complaint: fall   A complete HPI/ROS/PMH/PSH/SH/FH are unobtainable due to: dementia    Context: Jada Keith is a 89 y.o. female with past medical history of dementia, T2DM, HTN, CHF, history of non-Hodgkin's lymphoma who presents to the ED c/o right hip pain after a fall.  Patient was at her nursing facility, walking with a walker, when she fell.  There was no reported head injury, patient denies headache, neck pain, back pain, chest pain, shortness of breath, dizziness, nausea, vomiting, or UTI symptoms.      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 04/30/2017   • Chronic combined systolic and diastolic heart failure (HCC) 04/30/2017   • Collapsed vertebra, not elsewhere classified, thoracic region, initial encounter for fracture ([facility]) 04/30/2017   • Diastolic dysfunction 04/30/2017   • Diffuse non-Hodgkin's lymphoblastic lymphoma (HCC) 04/30/2017   • Diverticulosis of intestine 04/30/2017   • Uterine leiomyoma 04/30/2017   • Gastroesophageal reflux disease 06/01/1994   • Memory loss 04/30/2017   • Moderate mitral regurgitation 04/30/2017   • Obstructive sleep apnea syndrome 04/30/2017   • Osteoporosis with pathological fracture 04/30/2017   • Paroxysmal supraventricular tachycardia ([facility]) 04/30/2017   • Rectal disorder 04/30/2017   • Sinus tachycardia 10/01/2015   • Acute systolic (congestive) heart failure 04/30/2017   • Acute pulmonary edema (HCC) 04/30/2017   • Pedal edema 04/30/2017   • Acute respiratory failure with hypoxemia ([facility]) 04/30/2017   • Pleural effusion on right 04/30/2017   • Essential hypertension 04/30/2017   • Acute kidney injury ([facility]) 10/07/2017   • Closed subcapital fracture of right femur ([facility]) 10/07/2017   •  Leukocytosis 10/07/2017   • DM type 2 (diabetes mellitus, type 2), new diagnosis 10/08/2017   • Hyponatremia 10/09/2017   • DNR (do not resuscitate) 10/12/2017   • Heel ulcer, right, with unspecified severity ([facility]) 12/02/2017   • DM (diabetes mellitus) (HCC) 12/02/2017     Resolved Ambulatory Problems     Diagnosis Date Noted   • No Resolved Ambulatory Problems     Past Medical History:   Diagnosis Date   • Arthritis    • CHF (congestive heart failure) (CMS/HCC)    • Coronary artery disease    • GERD (gastroesophageal reflux disease)    • History of transfusion    • Hypertension    • Lymphoma (CMS/HCC)    • Status post chemotherapy          PAST SURGICAL HISTORY  Past Surgical History:   Procedure Laterality Date   • ABDOMINAL SURGERY      tumor removals due to cancer   • APPENDECTOMY     • BACK SURGERY     • CARDIAC CATHETERIZATION     • CHOLECYSTECTOMY     • EYE SURGERY      cataract surgery   • FRACTURE SURGERY      right hip   • HYSTERECTOMY     • JOINT REPLACEMENT      right hip   • WI PARTIAL HIP REPLACEMENT Right 10/8/2017    Procedure: CEMENTED BIPOLAR;  Surgeon: Michael England MD;  Location: [facility];  Service: Orthopedics   • SKIN BIOPSY     • TONSILLECTOMY     • TUMOR REMOVAL           FAMILY HISTORY  Family History   Family history unknown: Yes         SOCIAL HISTORY  Social History     Socioeconomic History   • Marital status:    Tobacco Use   • Smoking status: Never Smoker   • Smokeless tobacco: Never Used   Substance and Sexual Activity   • Alcohol use: Yes     Comment: once a year   • Drug use: No   • Sexual activity: Defer         ALLERGIES  Sertraline, Risedronate sodium, and Sulfa antibiotics        REVIEW OF SYSTEMS  Review of Systems   Limited due to dementia.       PHYSICAL EXAM    I have reviewed the triage vital signs and nursing notes.    ED Triage Vitals [11/17/21 2010]   Temp Heart Rate Resp BP SpO2   97.7 °F (36.5 °C) 83 18 128/62 96 %      Temp src Heart Rate Source  Patient Position BP Location FiO2 (%)   -- -- -- -- --       Physical Exam  GENERAL: Alert, not distressed  HENT: mask in place  EYES: no scleral icterus, PERRL, EOMI  CV: regular rhythm, regular rate  RESPIRATORY: normal effort  ABDOMEN: soft/nontender, lower abdomen is distended  MUSCULOSKELETAL: no deformity, tender to palpate over the right hip  NEURO: alert, moves all extremities, neuro at baseline, no focal neuro deficits, follows commands  SKIN: warm, dry, intact      LAB RESULTS  Recent Results (from the past 24 hour(s))   Comprehensive Metabolic Panel    Collection Time: 11/17/21 11:07 PM    Specimen: Blood   Result Value Ref Range    Glucose 206 (H) 65 - 99 mg/dL    BUN 41 (H) 8 - 23 mg/dL    Creatinine 1.27 (H) 0.57 - 1.00 mg/dL    Sodium 138 136 - 145 mmol/L    Potassium 3.9 3.5 - 5.2 mmol/L    Chloride 99 98 - 107 mmol/L    CO2 26.4 22.0 - 29.0 mmol/L    Calcium 9.6 8.2 - 9.6 mg/dL    Total Protein 7.4 6.0 - 8.5 g/dL    Albumin 4.20 3.50 - 5.20 g/dL    ALT (SGPT) 10 1 - 33 U/L    AST (SGOT) 15 1 - 32 U/L    Alkaline Phosphatase 89 39 - 117 U/L    Total Bilirubin 0.2 0.0 - 1.2 mg/dL    eGFR Non African Amer 39 (L) >60 mL/min/1.73    Globulin 3.2 gm/dL    A/G Ratio 1.3 g/dL    BUN/Creatinine Ratio 32.3 (H) 7.0 - 25.0    Anion Gap 12.6 5.0 - 15.0 mmol/L   Magnesium    Collection Time: 11/17/21 11:07 PM    Specimen: Blood   Result Value Ref Range    Magnesium 2.3 1.7 - 2.3 mg/dL   Digoxin Level    Collection Time: 11/17/21 11:07 PM    Specimen: Blood   Result Value Ref Range    Digoxin 0.80 0.60 - 1.20 ng/mL   CBC Auto Differential    Collection Time: 11/17/21 11:07 PM    Specimen: Blood   Result Value Ref Range    WBC 14.19 (H) 3.40 - 10.80 10*3/mm3    RBC 4.20 3.77 - 5.28 10*6/mm3    Hemoglobin 12.9 12.0 - 15.9 g/dL    Hematocrit 37.5 34.0 - 46.6 %    MCV 89.3 79.0 - 97.0 fL    MCH 30.7 26.6 - 33.0 pg    MCHC 34.4 31.5 - 35.7 g/dL    RDW 14.8 12.3 - 15.4 %    RDW-SD 47.8 37.0 - 54.0 fl    MPV 10.3 6.0 -  12.0 fL    Platelets 193 140 - 450 10*3/mm3    Neutrophil % 71.9 42.7 - 76.0 %    Lymphocyte % 18.8 (L) 19.6 - 45.3 %    Monocyte % 5.1 5.0 - 12.0 %    Eosinophil % 2.4 0.3 - 6.2 %    Basophil % 0.5 0.0 - 1.5 %    Immature Grans % 1.3 (H) 0.0 - 0.5 %    Neutrophils, Absolute 10.21 (H) 1.70 - 7.00 10*3/mm3    Lymphocytes, Absolute 2.67 0.70 - 3.10 10*3/mm3    Monocytes, Absolute 0.72 0.10 - 0.90 10*3/mm3    Eosinophils, Absolute 0.34 0.00 - 0.40 10*3/mm3    Basophils, Absolute 0.07 0.00 - 0.20 10*3/mm3    Immature Grans, Absolute 0.18 (H) 0.00 - 0.05 10*3/mm3    nRBC 0.0 0.0 - 0.2 /100 WBC   Urinalysis With Microscopic If Indicated (No Culture) - Urine, Catheter    Collection Time: 11/18/21 12:06 AM    Specimen: Urine, Catheter   Result Value Ref Range    Color, UA Yellow Yellow, Straw    Appearance, UA Clear Clear    pH, UA 6.0 5.0 - 8.0    Specific Gravity, UA 1.011 1.005 - 1.030    Glucose, UA Negative Negative    Ketones, UA Negative Negative    Bilirubin, UA Negative Negative    Blood, UA Negative Negative    Protein, UA Negative Negative    Leuk Esterase, UA Negative Negative    Nitrite, UA Negative Negative    Urobilinogen, UA 0.2 E.U./dL 0.2 - 1.0 E.U./dL       Ordered the above labs and independently reviewed the results.        RADIOLOGY  CT Head Without Contrast    Result Date: 11/18/2021  CT HEAD WITHOUT CONTRAST  HISTORY: Fall  COMPARISON: 01/13/2019  TECHNIQUE: Axial CT imaging was obtained through the brain. No IV contrast was administered.  FINDINGS: No acute intracranial hemorrhage is seen. There is diffuse atrophy. There is periventricular and deep white matter microangiopathic change. There is no midline shift or mass effect. Paranasal sinuses and mastoid air cells appear clear. No calvarial fracture is seen.      No acute intracranial findings.  Radiation dose reduction techniques were utilized, including automated exposure control and exposure modulation based on body size.  This report was  finalized on 11/18/2021 12:17 AM by Dr. Aliza Mitchell M.D.      XR Hip With or Without Pelvis 2 - 3 View Right    Result Date: 11/18/2021  AP VIEW THE PELVIS PLUS 2 VIEWS RIGHT HIP  HISTORY: Fall with right hip pain  COMPARISON: 10/08/2017  FINDINGS: Patient has a right hip arthroplasty. It is able in position when compared to prior exam from October 2017. There is deformity of the right inferior pubic ramus, new when compared to prior exam. Appearance is concerning for acute fracture. I cannot exclude a nondisplaced fracture of the right superior pubic ramus. No acute fracture or subluxation of the left hip is seen. There are degenerative changes of both SI joints, as well as of the lumbosacral spine.      Right inferior pubic ramus fracture. Possible nondisplaced fracture of the right superior pubic ramus.  This report was finalized on 11/18/2021 12:07 AM by Dr. Aliza Mitchell M.D.        I ordered the above noted radiological studies. Reviewed by me and discussed with radiologist.  See dictation for official radiology interpretation.      PROCEDURES    Procedures      MEDICATIONS GIVEN IN ER    Medications - No data to display      PROGRESS, DATA ANALYSIS, CONSULTS, AND MEDICAL DECISION MAKING    All labs have been independently reviewed by me.  All radiology studies have been reviewed by me and discussed with radiologist dictating the report.   EKG's independently viewed and interpreted by me.  Discussion below represents my analysis of pertinent findings related to patient's condition, differential diagnosis, treatment plan and final disposition.    DDx: Includes but not limited to right hip fracture, right hip contusion, musculoskeletal pain, minor head injury    ED Course as of 11/18/21 0131   Wed Nov 17, 2021   2336 WBC(!): 14.19 [JUAN]   2336 Hemoglobin: 12.9 [JUAN]   2336 Platelets: 193 [JUAN]   2336 Glucose(!): 206 [JUAN]   2336 BUN(!): 41 [JUAN]   2337 Creatinine(!): 1.27 [JUAN]   2337 Sodium: 138 [JUAN]   2337  Potassium: 3.9 [JUAN]   2337 Magnesium: 2.3 [JUAN]   2337 Chloride: 99 [JUAN]   2337 CO2: 26.4 [JUAN]   2337 Digoxin: 0.80 [JUAN]   Thu Nov 18, 2021   0008 Patient had 725 mL in her bladder after catheterization. [JUAN]   0127 Patient care discussed with Laura PRINCE, will place in observation to a [facility] bed per Dr. Carmichael. [JUAN]      ED Course User Index  [JUAN] Faisal Calderon PA       MDM: Patient has a right-sided pelvis fracture, is unable to ambulate, and will be placed in observation overnight for orthopedic consultation and possible placement in rehab.    PPE: The patient wore a surgical mask throughout the entire patient encounter. I wore an N95.    AS OF 01:31 EST VITALS:    BP - 136/65  HR - 81  TEMP - 97.7 °F (36.5 °C)  O2 SATS - 94%        DIAGNOSIS  Final diagnoses:   Closed fracture of right inferior pubic ramus, initial encounter (HCC)   Fall at nursing home, initial encounter   MARV (acute kidney injury) (HCC)   Urinary retention   Dementia without behavioral disturbance, unspecified dementia type (HCC)   Type 2 diabetes mellitus without complication, without long-term current use of insulin (HCC)   Congestive heart failure, unspecified HF chronicity, unspecified heart failure type (HCC)   Primary hypertension         DISPOSITION  ADMISSION    Discussed treatment plan and reason for admission with pt/family and admitting physician.  Pt/family voiced understanding of the plan for admission for further testing/treatment as needed.                  Faisal Calderon PA  11/18/21 0131

## 2021-11-18 NOTE — THERAPY EVALUATION
Patient Name: Jada Keith  : 4/10/1926    MRN: [id number]                              Today's Date: 2021       Admit Date: 2021    Visit Dx:     ICD-10-CM ICD-9-CM   1. Closed fracture of right inferior pubic ramus, initial encounter ([facility])  S32.591A 808.2   2. Fall at nursing home, initial encounter  W19.XXXA E888.9    Y92.129 E849.7   3. MARV (acute kidney injury) ([facility])  N17.9 584.9   4. Urinary retention  R33.9 788.20   5. Dementia without behavioral disturbance, unspecified dementia type ([facility])  F03.90 294.20   6. Type 2 diabetes mellitus without complication, without long-term current use of insulin ([facility])  E11.9 250.00   7. Congestive heart failure, unspecified HF chronicity, unspecified heart failure type ([facility])  I50.9 428.0   8. 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   • Chronic combined systolic and diastolic heart failure ([facility])   • Collapsed vertebra, not elsewhere classified, thoracic region, initial encounter for fracture ([facility])   • Diastolic dysfunction   • Diffuse non-Hodgkin's lymphoblastic lymphoma ([facility])   • Diverticulosis of intestine   • Uterine leiomyoma   • Gastroesophageal reflux disease   • Memory loss   • Moderate mitral regurgitation   • Obstructive sleep apnea syndrome   • Osteoporosis with pathological fracture   • Paroxysmal supraventricular tachycardia ([facility])   • Rectal disorder   • Sinus tachycardia   • Acute systolic (congestive) heart failure   • Acute pulmonary edema ([facility])   • Pedal edema   • Acute respiratory failure with hypoxemia ([facility])   • Pleural effusion on right   • Essential hypertension   • Acute kidney injury ([facility])   • Closed subcapital fracture of right femur ([facility])   • Leukocytosis   • Type 2 diabetes mellitus, without long-term current use of insulin ([facility])   • Hyponatremia   • DNR (do not resuscitate)   • Heel ulcer, right, with unspecified  severity (HCC)   • DM (diabetes mellitus) (HCC)   • Closed fracture of right inferior pubic ramus (HCC)   • History of non-Hodgkin's lymphoma   • Dementia without behavioral disturbance (HCC)     Past Medical History:   Diagnosis Date   • Arthritis    • CHF (congestive heart failure) (HCC)    • Coronary artery disease    • DM type 2 (diabetes mellitus, type 2), new diagnosis 10/8/2017   • GERD (gastroesophageal reflux disease)    • History of transfusion    • Hypertension    • Lymphoma (HCC)    • Status post chemotherapy      Past Surgical History:   Procedure Laterality Date   • ABDOMINAL SURGERY      tumor removals due to cancer   • APPENDECTOMY     • BACK SURGERY     • CARDIAC CATHETERIZATION     • CHOLECYSTECTOMY     • EYE SURGERY      cataract surgery   • FRACTURE SURGERY      right hip   • HYSTERECTOMY     • JOINT REPLACEMENT      right hip   • IN PARTIAL HIP REPLACEMENT Right 10/8/2017    Procedure: CEMENTED BIPOLAR;  Surgeon: Michael England MD;  Location: [facility];  Service: Orthopedics   • SKIN BIOPSY     • TONSILLECTOMY     • TUMOR REMOVAL        General Information     Row Name 11/18/21 1414          Physical Therapy Time and Intention    Document Type evaluation  -DB     Mode of Treatment individual therapy; physical therapy  -DB     Row Name 11/18/21 1414          General Information    Patient Profile Reviewed yes  -DB     Prior Level of Function max assist:; ADL's; grooming; dressing; bathing  -DB     Existing Precautions/Restrictions fall  -DB     Barriers to Rehab cognitive status  -DB     Row Name 11/18/21 1414          Living Environment    Lives With facility resident  -DB     Row Name 11/18/21 1414          Home Main Entrance    Number of Stairs, Main Entrance none  -DB     Row Name 11/18/21 1414          Stairs Within Home, Primary    Number of Stairs, Within Home, Primary none  -DB     Row Name 11/18/21 1414          Cognition    Orientation Status (Cognition) unable/difficult to  assess  -DB     Row Name 11/18/21 1414          Safety Issues, Functional Mobility    Impairments Affecting Function (Mobility) balance; cognition; strength; pain; postural/trunk control; endurance/activity tolerance  -DB     Cognitive Impairments, Mobility Safety/Performance sequencing abilities; attention  -DB           User Key  (r) = Recorded By, (t) = Taken By, (c) = Cosigned By    Initials Name Provider Type    DB Kendra Lubin, CHRISTIAN Physical Therapist               Mobility     Row Name 11/18/21 1417          Bed Mobility    Bed Mobility supine-sit; sit-supine; scooting/bridging  -DB     Scooting/Bridging [place] (Bed Mobility) dependent (less than 25% patient effort); 2 person assist; verbal cues  -DB     Supine-Sit [place] (Bed Mobility) moderate assist (50% patient effort); maximum assist (25% patient effort); verbal cues; nonverbal cues (demo/gesture); 2 person assist  -DB     Sit-Supine [place] (Bed Mobility) moderate assist (50% patient effort); maximum assist (25% patient effort); verbal cues; nonverbal cues (demo/gesture); 2 person assist  -DB     Assistive Device (Bed Mobility) bed rails; head of bed elevated; draw sheet  -DB     Row Name 11/18/21 1417          Sit-Stand Transfer    Sit-Stand [place] (Transfers) unable to assess  -DB     Row Name 11/18/21 1417          Gait/Stairs (Locomotion)    [place] Level (Gait) unable to assess  -DB           User Key  (r) = Recorded By, (t) = Taken By, (c) = Cosigned By    Initials Name Provider Type    DB Kendra Lubin, CHRISTIAN Physical Therapist               Obj/Interventions     Row Name 11/18/21 1418          Range of Motion Comprehensive    General Range of Motion no range of motion deficits identified  -DB     Row Name 11/18/21 1418          Strength Comprehensive (MMT)    Comment, General Manual Muscle Testing (MMT) Assessment BLE > 3/5 MMT  -DB     Row Name 11/18/21 1418          Motor Skills    Therapeutic Exercise --  seated  LAQ, AP  -DB     Row Name 11/18/21 1418          Balance    Balance Assessment sitting static balance; sitting dynamic balance  -DB     Static Sitting Balance mild impairment; sitting, edge of bed  -DB     Dynamic Sitting Balance mild impairment; sitting, edge of bed  -DB     Balance Interventions sitting  -DB     Comment, Balance able to sit EOB for ~4-5 min  -DB           User Key  (r) = Recorded By, (t) = Taken By, (c) = Cosigned By    Initials Name Provider Type    DB Kendra Lubin PT Physical Therapist               Goals/Plan     Row Name 11/18/21 1424          Bed Mobility Goal 1 (PT)    Activity/Assistive Device (Bed Mobility Goal 1, PT) bed mobility activities, all  -DB     [place] Level/Cues Needed (Bed Mobility Goal 1, PT) minimum assist (75% or more patient effort)  -DB     Time Frame (Bed Mobility Goal 1, PT) 1 week  -DB     Row Name 11/18/21 1424          Transfer Goal 1 (PT)    Activity/Assistive Device (Transfer Goal 1, PT) transfers, all  -DB     [place] Level/Cues Needed (Transfer Goal 1, PT) minimum assist (75% or more patient effort)  -DB     Time Frame (Transfer Goal 1, PT) 1 week  -DB     Row Name 11/18/21 1424          Gait Training Goal 1 (PT)    Activity/Assistive Device (Gait Training Goal 1, PT) gait (walking locomotion)  -DB     [place] Level (Gait Training Goal 1, PT) minimum assist (75% or more patient effort)  -DB     Time Frame (Gait Training Goal 1, PT) 1 week  -DB           User Key  (r) = Recorded By, (t) = Taken By, (c) = Cosigned By    Initials Name Provider Type    Kendra Chan, PT Physical Therapist               Clinical Impression     Row Name 11/18/21 1419          Plan of Care Review    Plan of Care Reviewed With patient; daughter  -DB     Outcome Summary Pt is a 89 y/o F admitted with pelvic fx. Pt arrives from facility where she was able to ambulate with RW, but req'd assist with dressing, toileting, bathing, and cooking d/t dementia. Pt's  daughter was present during initial evaluation, states last time pt was in the hospital, pt had a difficult time participating in PT d/t not asking for pain meds. RN present for this conversation. Pt was able to tolerate bed mobility this date, mod/maxA x 2 for sup<>sit and sit<>sup. Pt was able to sit EOB for ~4-5 min and perform LE exercises. Started to fatigue and returned to supine position. Pt is WBAT, but unsafe to attempt ambulation this date. Pt demo's dec'd strength, endurance, balance, and inc'd pain. Pt's daughter states the facility that pt came from will be able to do PT. Recommending D/C back to facility with PT or SNF.  -DB     Row Name 11/18/21 1419          Therapy Assessment/Plan (PT)    Rehab Potential (PT) good, to achieve stated therapy goals  -DB     Criteria for Skilled Interventions Met (PT) yes  -DB     Row Name 11/18/21 1419          Vital Signs    O2 Delivery Pre Treatment room air  -DB     O2 Delivery Intra Treatment room air  -DB     O2 Delivery Post Treatment room air  -DB     Pre Patient Position Supine  -DB     Intra Patient Position Sitting  -DB     Post Patient Position Supine  -DB     Row Name 11/18/21 1419          Positioning and Restraints    Pre-Treatment Position in bed  -DB     Post Treatment Position bed  -DB     In Bed call light within reach; encouraged to call for assist; exit alarm on; with family/caregiver; side lying right  -DB           User Key  (r) = Recorded By, (t) = Taken By, (c) = Cosigned By    Initials Name Provider Type    DB Kendra Lubin PT Physical Therapist               Outcome Measures     Row Name 11/18/21 6445          How much help from another person do you currently need...    Turning from your back to your side while in flat bed without using bedrails? 2  -DB     Moving from lying on back to sitting on the side of a flat bed without bedrails? 2  -DB     Moving to and from a bed to a chair (including a wheelchair)? 1  -DB     Standing up from a  chair using your arms (e.g., wheelchair, bedside chair)? 1  -DB     Climbing 3-5 steps with a railing? 1  -DB     To walk in hospital room? 1  -DB     AM-PAC 6 Clicks Score (PT) 8  -DB     Row Name 11/18/21 1425          Functional Assessment    Outcome Measure Options AM-PAC 6 Clicks Basic Mobility (PT)  -DB           User Key  (r) = Recorded By, (t) = Taken By, (c) = Cosigned By    Initials Name Provider Type    DB Kendra Lubin PT Physical Therapist                             Physical Therapy Education                 Title: PT OT SLP Therapies (Done)     Topic: Physical Therapy (In Progress)     Point: Mobility training (In Progress)     Learning Progress Summary           Patient Acceptance, E, NR by DB at 11/18/2021 1426   Family Acceptance, E, VU by DB at 11/18/2021 1425                   Point: Home exercise program (In Progress)     Learning Progress Summary           Patient Acceptance, E, NR by DB at 11/18/2021 1426   Family Acceptance, E, VU by DB at 11/18/2021 1425                   Point: Body mechanics (In Progress)     Learning Progress Summary           Patient Acceptance, E, NR by DB at 11/18/2021 1426   Family Acceptance, E, VU by DB at 11/18/2021 1425                   Point: Precautions (In Progress)     Learning Progress Summary           Patient Acceptance, E, NR by DB at 11/18/2021 1426   Family Acceptance, E, VU by DB at 11/18/2021 1425                               User Key     Initials Effective Dates Name Provider Type Discipline    DB 06/16/21 -  Kendra Lubin PT Physical Therapist PT              PT Recommendation and Plan  Planned Therapy Interventions (PT): balance training, bed mobility training, gait training, home exercise program, postural re-education, patient/family education, neuromuscular re-education, stair training, strengthening, transfer training  Plan of Care Reviewed With: patient, daughter  Outcome Summary: Pt is a 89 y/o F admitted with pelvic fx. Pt arrives  from facility where she was able to ambulate with RW, but req'd assist with dressing, toileting, bathing, and cooking d/t dementia. Pt's daughter was present during initial evaluation, states last time pt was in the hospital, pt had a difficult time participating in PT d/t not asking for pain meds. RN present for this conversation. Pt was able to tolerate bed mobility this date, mod/maxA x 2 for sup<>sit and sit<>sup. Pt was able to sit EOB for ~4-5 min and perform LE exercises. Started to fatigue and returned to supine position. Pt is WBAT, but unsafe to attempt ambulation this date. Pt demo's dec'd strength, endurance, balance, and inc'd pain. Pt's daughter states the facility that pt came from will be able to do PT. Recommending D/C back to facility with PT or SNF.     Time Calculation:    PT Charges     Row Name 11/18/21 1426             Time Calculation    Start Time 1318  -DB      Stop Time 1335  -DB      Time Calculation (min) 17 min  -DB      PT Received On 11/18/21  -DB      PT - Next Appointment 11/19/21  -DB      PT Goal Re-Cert Due Date 11/25/21  -DB              Time Calculation- PT    Total Timed Code Minutes- PT 8 minute(s)  -DB            User Key  (r) = Recorded By, (t) = Taken By, (c) = Cosigned By    Initials Name Provider Type    DB Kendra Lubin, PT Physical Therapist              Therapy Charges for Today     Code Description Service Date Service Provider Modifiers Qty    64014419116 HC PT EVAL MOD COMPLEXITY 2 11/18/2021 Kendra Lubin, PT GP 1    07967167976 HC PT THER PROC EA 15 MIN 11/18/2021 Kendra Lubin, PT GP 1          PT G-Codes  Outcome Measure Options: AM-PAC 6 Clicks Basic Mobility (PT)  AM-PAC 6 Clicks Score (PT): 8    Kendra Lubin PT  11/18/2021

## 2021-11-19 LAB
ANION GAP SERPL CALCULATED.3IONS-SCNC: 9.5 MMOL/L (ref 5–15)
BASOPHILS # BLD AUTO: 0.04 10*3/MM3 (ref 0–0.2)
BASOPHILS NFR BLD AUTO: 0.4 % (ref 0–1.5)
BUN SERPL-MCNC: 31 MG/DL (ref 8–23)
BUN/CREAT SERPL: 30.1 (ref 7–25)
CALCIUM SPEC-SCNC: 8.7 MG/DL (ref 8.2–9.6)
CHLORIDE SERPL-SCNC: 104 MMOL/L (ref 98–107)
CO2 SERPL-SCNC: 26.5 MMOL/L (ref 22–29)
CREAT SERPL-MCNC: 1.03 MG/DL (ref 0.57–1)
DEPRECATED RDW RBC AUTO: 49.9 FL (ref 37–54)
EOSINOPHIL # BLD AUTO: 0.31 10*3/MM3 (ref 0–0.4)
EOSINOPHIL NFR BLD AUTO: 3.1 % (ref 0.3–6.2)
ERYTHROCYTE [DISTWIDTH] IN BLOOD BY AUTOMATED COUNT: 14.8 % (ref 12.3–15.4)
GFR SERPL CREATININE-BSD FRML MDRD: 50 ML/MIN/1.73
GLUCOSE BLDC GLUCOMTR-MCNC: 155 MG/DL (ref 70–130)
GLUCOSE BLDC GLUCOMTR-MCNC: 166 MG/DL (ref 70–130)
GLUCOSE BLDC GLUCOMTR-MCNC: 166 MG/DL (ref 70–130)
GLUCOSE BLDC GLUCOMTR-MCNC: 199 MG/DL (ref 70–130)
GLUCOSE SERPL-MCNC: 183 MG/DL (ref 65–99)
HCT VFR BLD AUTO: 35.2 % (ref 34–46.6)
HGB BLD-MCNC: 11.2 G/DL (ref 12–15.9)
IMM GRANULOCYTES # BLD AUTO: 0.06 10*3/MM3 (ref 0–0.05)
IMM GRANULOCYTES NFR BLD AUTO: 0.6 % (ref 0–0.5)
LYMPHOCYTES # BLD AUTO: 1.57 10*3/MM3 (ref 0.7–3.1)
LYMPHOCYTES NFR BLD AUTO: 15.8 % (ref 19.6–45.3)
MAGNESIUM SERPL-MCNC: 2.4 MG/DL (ref 1.7–2.3)
MCH RBC QN AUTO: 29.5 PG (ref 26.6–33)
MCHC RBC AUTO-ENTMCNC: 31.8 G/DL (ref 31.5–35.7)
MCV RBC AUTO: 92.6 FL (ref 79–97)
MONOCYTES # BLD AUTO: 0.86 10*3/MM3 (ref 0.1–0.9)
MONOCYTES NFR BLD AUTO: 8.7 % (ref 5–12)
NEUTROPHILS NFR BLD AUTO: 7.07 10*3/MM3 (ref 1.7–7)
NEUTROPHILS NFR BLD AUTO: 71.4 % (ref 42.7–76)
NRBC BLD AUTO-RTO: 0 /100 WBC (ref 0–0.2)
PHOSPHATE SERPL-MCNC: 3 MG/DL (ref 2.5–4.5)
PLATELET # BLD AUTO: 158 10*3/MM3 (ref 140–450)
PMV BLD AUTO: 10.2 FL (ref 6–12)
POTASSIUM SERPL-SCNC: 4 MMOL/L (ref 3.5–5.2)
RBC # BLD AUTO: 3.8 10*6/MM3 (ref 3.77–5.28)
SODIUM SERPL-SCNC: 140 MMOL/L (ref 136–145)
WBC NRBC COR # BLD: 9.91 10*3/MM3 (ref 3.4–10.8)

## 2021-11-19 PROCEDURE — 63710000001 INSULIN LISPRO (HUMAN) PER 5 UNITS: Performed by: STUDENT IN AN ORGANIZED HEALTH CARE EDUCATION/TRAINING PROGRAM

## 2021-11-19 PROCEDURE — 97110 THERAPEUTIC EXERCISES: CPT

## 2021-11-19 PROCEDURE — G0378 HOSPITAL OBSERVATION PER HR: HCPCS

## 2021-11-19 PROCEDURE — 80048 BASIC METABOLIC PNL TOTAL CA: CPT | Performed by: STUDENT IN AN ORGANIZED HEALTH CARE EDUCATION/TRAINING PROGRAM

## 2021-11-19 PROCEDURE — 51798 US URINE CAPACITY MEASURE: CPT

## 2021-11-19 PROCEDURE — 82962 GLUCOSE BLOOD TEST: CPT

## 2021-11-19 PROCEDURE — 83735 ASSAY OF MAGNESIUM: CPT | Performed by: STUDENT IN AN ORGANIZED HEALTH CARE EDUCATION/TRAINING PROGRAM

## 2021-11-19 PROCEDURE — 97166 OT EVAL MOD COMPLEX 45 MIN: CPT

## 2021-11-19 PROCEDURE — 51701 INSERT BLADDER CATHETER: CPT

## 2021-11-19 PROCEDURE — 84100 ASSAY OF PHOSPHORUS: CPT | Performed by: STUDENT IN AN ORGANIZED HEALTH CARE EDUCATION/TRAINING PROGRAM

## 2021-11-19 PROCEDURE — 85025 COMPLETE CBC W/AUTO DIFF WBC: CPT | Performed by: STUDENT IN AN ORGANIZED HEALTH CARE EDUCATION/TRAINING PROGRAM

## 2021-11-19 RX ORDER — INSULIN LISPRO 100 [IU]/ML
0-7 INJECTION, SOLUTION INTRAVENOUS; SUBCUTANEOUS
Status: DISCONTINUED | OUTPATIENT
Start: 2021-11-19 | End: 2021-11-20 | Stop reason: HOSPADM

## 2021-11-19 RX ORDER — DEXTROSE MONOHYDRATE 25 G/50ML
25 INJECTION, SOLUTION INTRAVENOUS
Status: DISCONTINUED | OUTPATIENT
Start: 2021-11-19 | End: 2021-11-20 | Stop reason: HOSPADM

## 2021-11-19 RX ORDER — NICOTINE POLACRILEX 4 MG
15 LOZENGE BUCCAL
Status: DISCONTINUED | OUTPATIENT
Start: 2021-11-19 | End: 2021-11-20 | Stop reason: HOSPADM

## 2021-11-19 RX ADMIN — ALLOPURINOL 100 MG: 100 TABLET ORAL at 09:24

## 2021-11-19 RX ADMIN — SACUBITRIL AND VALSARTAN 1 TABLET: 24; 26 TABLET, FILM COATED ORAL at 09:24

## 2021-11-19 RX ADMIN — ACETAMINOPHEN 650 MG: 325 TABLET, FILM COATED ORAL at 12:45

## 2021-11-19 RX ADMIN — MIRTAZAPINE 7.5 MG: 15 TABLET, FILM COATED ORAL at 20:41

## 2021-11-19 RX ADMIN — INSULIN LISPRO 2 UNITS: 100 INJECTION, SOLUTION INTRAVENOUS; SUBCUTANEOUS at 18:22

## 2021-11-19 RX ADMIN — Medication 5 MG: at 20:41

## 2021-11-19 RX ADMIN — INSULIN LISPRO 2 UNITS: 100 INJECTION, SOLUTION INTRAVENOUS; SUBCUTANEOUS at 12:44

## 2021-11-19 RX ADMIN — SODIUM CHLORIDE 75 ML/HR: 9 INJECTION, SOLUTION INTRAVENOUS at 03:04

## 2021-11-19 RX ADMIN — SODIUM CHLORIDE, PRESERVATIVE FREE 10 ML: 5 INJECTION INTRAVENOUS at 20:41

## 2021-11-19 RX ADMIN — DOCUSATE SODIUM 50MG AND SENNOSIDES 8.6MG 2 TABLET: 8.6; 5 TABLET, FILM COATED ORAL at 09:25

## 2021-11-19 RX ADMIN — SACUBITRIL AND VALSARTAN 1 TABLET: 24; 26 TABLET, FILM COATED ORAL at 20:41

## 2021-11-19 RX ADMIN — DIGOXIN 125 MCG: 125 TABLET ORAL at 12:44

## 2021-11-19 RX ADMIN — METOPROLOL SUCCINATE 25 MG: 25 TABLET, EXTENDED RELEASE ORAL at 09:24

## 2021-11-19 RX ADMIN — SODIUM CHLORIDE, PRESERVATIVE FREE 10 ML: 5 INJECTION INTRAVENOUS at 09:24

## 2021-11-19 NOTE — CASE MANAGEMENT/SOCIAL WORK
Continued Stay Note  [facility]     Patient Name: Jada Keith  MRN: [id number]  Today's Date: 11/19/2021    Admit Date: 11/17/2021     Discharge Plan     Row Name 11/19/21 1601       Plan    Plan [facility] -- Accepted.    Patient/Family in Agreement with Plan yes    Plan Comments Received a call from [facility] who has accepted the patient and will have a SNF bed for her tomorrow. Updated the patient's daughter/Berkley who's agreeable. Patient will need an ambulance transport. Updated JOSIE Jacob/A Liaison who will discuss with Dr. Nuno. CCP following.    Row Name 11/19/21 1515       Plan    Plan SNF -- Referrals Pending.    Patient/Family in Agreement with Plan yes    Plan Comments Spoke with the patient's daughter/Berkley regarding the patient's discharge plan. She plans for the patient to d/c to SNF. CMS Compare SNF List was reviewed and she requests [facility], [facility], [facility], [facility] & [facility] [place] [place]. Referrals sent in Epic. Spoke with Mable/Iban & Jessica/Alexander who will look into the patient's case. Spoke with [facility] who may have a SNF bed available this weekend at their [place] location. She will look into the patient's case and f/u with CCP.               Discharge Codes    No documentation.                     Rhea Santizo RN

## 2021-11-19 NOTE — PLAN OF CARE
Goal Outcome Evaluation:  Plan of Care Reviewed With: patient        Progress: improving  Outcome Summary: 89 yo female admitted from ER with R pelvic fx. VSS. Pt on room air. NVI. AxO to self only, very pleasant. No complaints of pain except with movement. Pt straight canthed on shift d/t urinary retention. will continue to monitor.

## 2021-11-19 NOTE — PLAN OF CARE
Goal Outcome Evaluation:Pt admitted through [place] [facility] [place], VSS, afebrile, Niece at bedside, states pt is DNR at facility, paperwork has been faxed from facility and needs to be addressed by MD. Will be seen by Dr. Alvin Rashid in am. No plans for surgery at this time.

## 2021-11-19 NOTE — THERAPY EVALUATION
Patient Name: Jada Keith  : 4/10/1926    MRN: [id number]                              Today's Date: 2021       Admit Date: 2021    Visit Dx:     ICD-10-CM ICD-9-CM   1. Closed fracture of right inferior pubic ramus, initial encounter (Formerly [facility])  S32.591A 808.2   2. Fall at nursing home, initial encounter  W19.XXXA E888.9    Y92.129 E849.7   3. MARV (acute kidney injury) (Formerly [facility])  N17.9 584.9   4. Urinary retention  R33.9 788.20   5. Dementia without behavioral disturbance, unspecified dementia type (Formerly [facility])  F03.90 294.20   6. Type 2 diabetes mellitus without complication, without long-term current use of insulin (Formerly [facility])  E11.9 250.00   7. Congestive heart failure, unspecified HF chronicity, unspecified heart failure type (Formerly [facility])  I50.9 428.0   8. 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   • Chronic combined systolic and diastolic heart failure (Formerly [facility])   • Collapsed vertebra, not elsewhere classified, thoracic region, initial encounter for fracture (Formerly [facility])   • Diastolic dysfunction   • Diffuse non-Hodgkin's lymphoblastic lymphoma (Formerly [facility])   • Diverticulosis of intestine   • Uterine leiomyoma   • Gastroesophageal reflux disease   • Memory loss   • Moderate mitral regurgitation   • Obstructive sleep apnea syndrome   • Osteoporosis with pathological fracture   • Paroxysmal supraventricular tachycardia (Formerly [facility])   • Rectal disorder   • Sinus tachycardia   • Acute systolic (congestive) heart failure   • Acute pulmonary edema ([facility]ly [facility])   • Pedal edema   • Acute respiratory failure with hypoxemia (Formerly [facility])   • Pleural effusion on right   • Essential hypertension   • Acute kidney injury (HCC)   • Closed subcapital fracture of right femur (Formerly [facility])   • Leukocytosis   • Type 2 diabetes mellitus, without long-term current use of insulin (Formerly [facility])   • Hyponatremia   • DNR (do not resuscitate)   • Heel ulcer, right, with unspecified  severity (HCC)   • DM (diabetes mellitus) (HCC)   • Closed fracture of right inferior pubic ramus (HCC)   • History of non-Hodgkin's lymphoma   • Dementia without behavioral disturbance (HCC)     Past Medical History:   Diagnosis Date   • Arthritis    • CHF (congestive heart failure) (HCC)    • Coronary artery disease    • DM type 2 (diabetes mellitus, type 2), new diagnosis 10/8/2017   • GERD (gastroesophageal reflux disease)    • History of transfusion    • Hypertension    • Lymphoma (HCC)    • Status post chemotherapy      Past Surgical History:   Procedure Laterality Date   • ABDOMINAL SURGERY      tumor removals due to cancer   • APPENDECTOMY     • BACK SURGERY     • CARDIAC CATHETERIZATION     • CHOLECYSTECTOMY     • EYE SURGERY      cataract surgery   • FRACTURE SURGERY      right hip   • HYSTERECTOMY     • JOINT REPLACEMENT      right hip   • MS PARTIAL HIP REPLACEMENT Right 10/8/2017    Procedure: CEMENTED BIPOLAR;  Surgeon: Michael England MD;  Location: [facility];  Service: Orthopedics   • SKIN BIOPSY     • TONSILLECTOMY     • TUMOR REMOVAL        General Information     Row Name 11/19/21 1329          OT Time and Intention    Document Type evaluation; therapy note (daily note)  pt seen by OT in AM, pain limited attempt to move to EOB, PT and OT arrange with RN to return at 1300 with pain meds at 1230 to move EOB.  -LE     Mode of Treatment individual therapy; occupational therapy; co-treatment; physical therapy  -LE     Row Name 11/19/21 8873          General Information    Patient Profile Reviewed yes  -LE     Prior Level of Function --  dght reports from personal care, pt usually able to complete ADL, walks with walker and very social.  h/o dementia  -LE     Existing Precautions/Restrictions fall  -LE     Row Name 11/19/21 7377          Living Environment    Lives With --  personal care unit.  -LE     Row Name 11/19/21 5887          Cognition    Orientation Status (Cognition) oriented to; person   orient pt to place and situation. pt is pleasant.  intially hollers when attempt to nikos/doff SCD in AM.  in PM session pt holler to begin moving and reports both pain and fear of falling. pt responds well to encouragement, reassurance, explanation.  -LE     Row Name 11/19/21 1329          Safety Issues, Functional Mobility    Impairments Affecting Function (Mobility) cognition; pain; postural/trunk control; endurance/activity tolerance  -LE           User Key  (r) = Recorded By, (t) = Taken By, (c) = Cosigned By    Initials Name Provider Type    Funmilayo Augustine OTR Occupational Therapist                 Mobility/ADL's     Row Name 11/19/21 1333          Bed Mobility    Bed Mobility supine-sit; sit-supine  -LE     Scooting/Bridging [place] (Bed Mobility) verbal cues; 2 person assist; dependent (less than 25% patient effort)  -LE     Supine-Sit [place] (Bed Mobility) maximum assist (25% patient effort); 2 person assist; verbal cues  -LE     Sit-Supine [place] (Bed Mobility) verbal cues; nonverbal cues (demo/gesture); maximum assist (25% patient effort); 2 person assist  -LE     Bed Mobility, Safety Issues cognitive deficits limit understanding; decreased use of legs for bridging/pushing  -LE     Assistive Device (Bed Mobility) bed rails; draw sheet; head of bed elevated  -LE     Comment (Bed Mobility) move slowly, ed pt coordination breathing during movement,demo and VC to keep legs together when moving to EOB to reduce pain  -     Row Name 11/19/21 1333          Transfers    Transfers sit-stand transfer  -LE     Comment (Transfers) cues for upright posture, hand placement and body mechanics when stand at walker to increase confidence and tolerance.  -LE     Sit-Stand [place] (Transfers) moderate assist (50% patient effort); verbal cues; nonverbal cues (demo/gesture)  -     Row Name 11/19/21 1333          Sit-Stand Transfer    Assistive Device (Sit-Stand Transfers) walker, front-wheeled  -LE   "   Row Name 11/19/21 1333          Functional Mobility    Functional Mobility- Comment took a few steps away and back to bed with walker and assist of 2  -[facility] Name 11/19/21 1333          Activities of Daily Living    BADL Assessment/Intervention lower body dressing; feeding; toileting  -[facility] Name 11/19/21 1333          Lower Body Dressing Assessment/Training    Comment (Lower Body Dressing) full assist to nikos/doff SCD.  pain and limited mobility limits attempt at ADL today.  -[facility] Name 11/19/21 1333          Self-Feeding Assessment/Training    Comment (Feeding) [facility] reports feeding pt lunch due to hard to reach and feed self in bed.  -[facility] Name 11/19/21 1333          Toileting Assessment/Training    Comment (Toileting) full assist per brief.  -           User Key  (r) = Recorded By, (t) = Taken By, (c) = Cosigned By    Initials Name Provider Type    Funmilayo Augustine OTR Occupational Therapist               Obj/Interventions     Row Name 11/19/21 1337          Range of Motion Comprehensive    Comment, General Range of Motion B Shld AROM : 1/2, elbow 2/3.  pt able to reach and grasp walker.  when raise arms while supine pt reports \"bucket\" pain. [facility] states referring to her bottom.  -[facility] Name 11/19/21 1337          Balance    Balance Assessment sitting static balance; standing static balance  -     Static Sitting Balance supported  SBA once EOB and hips adjusted.  -LE     Balance Interventions standing; supported; static; core stability exercise  posture and  cues.  -           User Key  (r) = Recorded By, (t) = Taken By, (c) = Cosigned By    Initials Name Provider Type    Funmilayo Augustine OTR Occupational Therapist               Goals/Plan     Row Name 11/19/21 1343          Transfer Goal 1 (OT)    Activity/Assistive Device (Transfer Goal 1, OT) sit-to-stand/stand-to-sit; bed-to-chair/chair-to-bed; toilet; commode, 3-in-1; walker, rolling  -LE     [place] " Level/Cues Needed (Transfer Goal 1, OT) moderate assist (50-74% patient effort); verbal cues required  -LE     Time Frame (Transfer Goal 1, OT) 2 weeks  -LE     Progress/Outcome (Transfer Goal 1, OT) goal ongoing  -     Row Name 11/19/21 1343          Toileting Goal 1 (OT)    Activity/Device (Toileting Goal 1, OT) perform perineal hygiene; commode, 3-in-1  -LE     [place] Level/Cues Needed (Toileting Goal 1, OT) minimum assist (75% or more patient effort)  -LE     Time Frame (Toileting Goal 1, OT) 2 weeks  -LE     Progress/Outcome (Toileting Goal 1, OT) goal ongoing  -     Row Name 11/19/21 1343          Grooming Goal 1 (OT)    Activity/Device (Grooming Goal 1, OT) oral care; wash face, hands  seated EOB.  -LE     [place] (Grooming Goal 1, OT) set-up required  -LE     Time Frame (Grooming Goal 1, OT) 2 weeks  -LE     Progress/Outcome (Grooming Goal 1, OT) goal ongoing  -[facility] Name 11/19/21 1343          ROM Goal 1 (OT)    ROM Goal 1 (OT) B UE AROM to increase functional tolerance to reach and hold during ADL and xfers.  -LE     Time Frame (ROM Goal 1, OT) 2 weeks  -LE     Progress/Outcome (ROM Goal 1, OT) goal ongoing  -[facility] Name 11/19/21 1343          Therapy Assessment/Plan (OT)    Planned Therapy Interventions (OT) activity tolerance training; adaptive equipment training; BADL retraining; cognitive/visual perception retraining; functional balance retraining; occupation/activity based interventions; patient/caregiver education/training; transfer/mobility retraining; ROM/therapeutic exercise; strengthening exercise  -LE           User Key  (r) = Recorded By, (t) = Taken By, (c) = Cosigned By    Initials Name Provider Type    Funmilayo Augustine OTR Occupational Therapist               Clinical Impression     [facility] Name 11/19/21 8630          Pain Assessment    Additional Documentation Pain Scale: FACES Pre/Post-Treatment (Group)  -LE     Row Name 11/19/21 5019          Pain Scale: Numbers  "Pre/Post-Treatment    Pain Intervention(s) Repositioned; Relaxation technique; Emotional support; Rest; Therapeutic presence; Therapeutic touch  -MILY     Row Name 11/19/21 7976          Pain Scale: FACES Pre/Post-Treatment    Pain: FACES Scale, Pretreatment 2-->hurts little bit  no grimace at rest  -LE     Posttreatment Pain Rating 10-->hurts worst  hollers when first move LE.  then once seated EOB pt reports fear of falling and when asked about pt states \"no so bad'  -MILY     Row Name 11/19/21 3531          Plan of Care Review    Plan of Care Reviewed With patient; daughter  -LE     Outcome Summary Pt admit [facility] with fall and pelvic fracture. Family reports pt up with walker on own and usually able to do ADL.  Pt seen twice today by OT with second session in cotreat with PT to move to sit and stand EOB.  Pt initially hollers with pain and fear but responds to cueing and reasurrance.  Dght present during sessions. Agree with plan for SNU at d/c.  -     Row Name 11/19/21 9861          Therapy Assessment/Plan (OT)    Patient/Family Therapy Goal Statement (OT) improve mobilty and ability to care for self, reduce pain  -LE     Criteria for Skilled Therapeutic Interventions Met (OT) meets criteria; yes  -LE     Therapy Frequency (OT) 5 times/wk  -     Row Name 11/19/21 0406          Therapy Plan Review/Discharge Plan (OT)    Equipment Needs Upon Discharge (OT) --  walker, 3in1, shower bench anticipated at this time.  -LE     Anticipated Discharge Disposition (OT) skilled nursing facility  -     Row Name 11/19/21 7177          Vital Signs    O2 Delivery Pre Treatment room air  -LE     O2 Delivery Intra Treatment room air  -LE     Post SpO2 (%) 96  -LE     O2 Delivery Post Treatment room air  -LE     Pre Patient Position Supine  -LE     Intra Patient Position Standing  -LE     Post Patient Position Supine  -LE     Row Name 11/19/21 9304          Positioning and Restraints    Pre-Treatment Position in bed  -LE     " Post Treatment Position bed  -LE     In Bed notified nsg; fowlers; call light within reach; encouraged to call for assist; exit alarm on; with family/caregiver; heels elevated  RN aware dght waiting to talk with CCP again about SNU choices.  -LE           User Key  (r) = Recorded By, (t) = Taken By, (c) = Cosigned By    Initials Name Provider Type    Funmilayo Augustine OTR Occupational Therapist               Outcome Measures     Row Name 11/19/21 1345          How much help from another is currently needed...    Putting on and taking off regular lower body clothing? 1  -LE     Bathing (including washing, rinsing, and drying) 1  -LE     Toileting (which includes using toilet bed pan or urinal) 1  -LE     Putting on and taking off regular upper body clothing 1  -LE     Taking care of personal grooming (such as brushing teeth) 2  -LE     Eating meals 2  -LE     AM-PAC 6 Clicks Score (OT) 8  -LE     Row Name 11/19/21 1346          Functional Assessment    Outcome Measure Options AM-PAC 6 Clicks Daily Activity (OT)  -LE           User Key  (r) = Recorded By, (t) = Taken By, (c) = Cosigned By    Initials Name Provider Type    Funmilayo Augustine OTEBONIE Occupational Therapist                Occupational Therapy Education                 Title: PT OT SLP Therapies (Done)     Topic: Occupational Therapy (Done)     Point: ADL training (Done)     Description:   Instruct learner(s) on proper safety adaptation and remediation techniques during self care or transfers.   Instruct in proper use of assistive devices.              Learning Progress Summary           Patient Acceptance, E,D,H, VU,DU by MILY at 11/19/2021 1345    Comment: role of OT, plan of care, xfer tech   Family Acceptance, E,D,H, VU,DU by MILY at 11/19/2021 1345    Comment: role of OT, plan of care, xfer tech                   Point: Precautions (Done)     Description:   Instruct learner(s) on prescribed precautions during self-care and functional transfers.               Learning Progress Summary           Patient Acceptance, E,D,H, VU,DU by MILY at 11/19/2021 1345    Comment: role of OT, plan of care, xfer tech   Family Acceptance, E,D,H, VU,DU by MILY at 11/19/2021 1345    Comment: role of OT, plan of care, xfer tech                   Point: Body mechanics (Done)     Description:   Instruct learner(s) on proper positioning and spine alignment during self-care, functional mobility activities and/or exercises.              Learning Progress Summary           Patient Acceptance, E,D,H, VU,DU by MILY at 11/19/2021 1345    Comment: role of OT, plan of care, xfer tech   Family Acceptance, E,D,H, VU,DU by MILY at 11/19/2021 1345    Comment: role of OT, plan of care, [employer] tech                               User Key     Initials Effective Dates Name Provider Type Discipline     06/16/21 -  Funmilayo Kumari, OTR Occupational Therapist OT              OT Recommendation and Plan  Planned Therapy Interventions (OT): activity tolerance training, adaptive equipment training, BADL retraining, cognitive/visual perception retraining, functional balance retraining, occupation/activity based interventions, patient/caregiver education/training, transfer/mobility retraining, ROM/therapeutic exercise, strengthening exercise  Therapy Frequency (OT): 5 times/wk  Plan of Care Review  Plan of Care Reviewed With: patient, daughter  Outcome Summary: Pt admit from [facility] with fall and pelvic fracture. Family reports pt up with walker on own and usually able to do ADL.  Pt seen twice today by OT with second session in cotreat with PT to move to sit and stand EOB.  Pt initially hollers with pain and fear but responds to cueing and reasurrance.  Dght present during sessions. Agree with plan for SNU at d/c.     Time Calculation:    Time Calculation- OT     Row Name 11/19/21 1346 11/19/21 1331          Time Calculation- OT    OT Start Time 1304  -LE 1036  -LE     OT Stop Time 1323  -LE 1054  -LE     OT Time Calculation (min) 19  min  -LE 18 min  -LE     Total Timed Code Minutes- OT 24 minute(s)  -LE --            Timed Charges    41932 - OT Therapeutic Activity Minutes 24  -LE --            Untimed Charges    OT Eval/Re-eval Minutes 13  -LE --            Total Minutes    Timed Charges Total Minutes 24  -LE --     Untimed Charges Total Minutes 13  -LE --      Total Minutes 37  -LE --           User Key  (r) = Recorded By, (t) = Taken By, (c) = Cosigned By    Initials Name Provider Type    Funmilayo Augustine OTR Occupational Therapist              Therapy Charges for Today     Code Description Service Date Service Provider Modifiers Qty    [id number]  OT EVAL MOD COMPLEXITY 2 11/19/2021 Funmilayo Kumari OTR GO 1    [id number]  OT THER PROC EA 15 MIN 11/19/2021 Funmilayo Kumari OTR GO 2               QUINTIN Duncan  11/19/2021

## 2021-11-19 NOTE — PROGRESS NOTES
Name: Jada Keith ADMIT: 2021   : 4/10/1926  PCP: Angela Cantu APRN    MRN: [id number] LOS: 0 days   AGE/SEX: 89 y.o. female  ROOM: [facility]     Subjective   Subjective   Resting comfortably in bed, no new complaints and no events overnight    Review of Systems  Denies chest pain, fevers, abdominal pain or nausea     Objective   Objective   Vital Signs  Temp:  [97.1 °F (36.2 °C)-97.8 °F (36.6 °C)] 97.8 °F (36.6 °C)  Heart Rate:  [102-109] 106  Resp:  [16] 16  BP: (104-118)/(51-72) 112/72  SpO2:  [89 %-95 %] 94 %  on   ;   Device (Oxygen Therapy): room air  There is no height or weight on file to calculate BMI.  Physical Exam  Constitutional:       General: She is not in acute distress.     Appearance: She is not diaphoretic.      Comments: Frail, chronically ill-appearing.  Lethargic   Cardiovascular:      Rate and Rhythm: Normal rate and regular rhythm.   Pulmonary:      Effort: Pulmonary effort is normal. No respiratory distress.      Breath sounds: No wheezing or rhonchi.   Abdominal:      Palpations: Abdomen is soft.      Tenderness: There is no abdominal tenderness. There is no guarding.   Skin:     General: Skin is warm and dry.         Results Review     I reviewed the patient's new clinical results.  Results from last 7 days   Lab Units 21  0646 21  0600 21  2307   WBC 10*3/mm3 9.91 12.65* 14.19*   HEMOGLOBIN g/dL 11.2* 12.5 12.9   PLATELETS 10*3/mm3 158 161 193     Results from last 7 days   Lab Units 21  0645 21  0600 21  2307   SODIUM mmol/L 140 140 138   POTASSIUM mmol/L 4.0 4.4 3.9   CHLORIDE mmol/L 104 100 99   CO2 mmol/L 26.5 28.6 26.4   BUN mg/dL 31* 35* 41*   CREATININE mg/dL 1.03* 1.17* 1.27*   GLUCOSE mg/dL 183* 214* 206*   CrCl cannot be calculated (Unknown ideal weight.).  Results from last 7 days   Lab Units 21  2307   ALBUMIN g/dL 4.20   BILIRUBIN mg/dL 0.2   ALK PHOS U/L 89   AST (SGOT) U/L 15   ALT (SGPT) U/L 10     Results  from last 7 days   Lab Units 11/19/21  0645 11/18/21  0600 11/17/21  2307   CALCIUM mg/dL 8.7 9.3 9.6   ALBUMIN g/dL  --   --  4.20   MAGNESIUM mg/dL 2.4*  --  2.3   PHOSPHORUS mg/dL 3.0  --   --        COVID19   Date Value Ref Range Status   11/18/2021 Not Detected Not Detected - Ref. Range Final     Hemoglobin A1C   Date/Time Value Ref Range Status   11/18/2021 0600 7.52 (H) 4.80 - 5.60 % Final     Glucose   Date/Time Value Ref Range Status   11/19/2021 1104 166 (H) 70 - 130 mg/dL Final     Comment:     Meter: [id number] : [id number] Edwin Marcial NA   11/19/2021 0606 166 (H) 70 - 130 mg/dL Final     Comment:     Meter: [id number] : [id number] Bertin Hornera NA   11/18/2021 2102 176 (H) 70 - 130 mg/dL Final     Comment:     Meter: [id number] : [id number] Bertin Minda NA   11/18/2021 1635 184 (H) 70 - 130 mg/dL Final     Comment:     Meter: [id number] : [id number] Edilson Bernard NA   11/18/2021 1130 170 (H) 70 - 130 mg/dL Final     Comment:     Meter: [id number] : [id number] Edilson Bernard NA   11/18/2021 0621 206 (H) 70 - 130 mg/dL Final     Comment:     Meter: [id number] : [id number] Kris Sellerschristine INIGUEZ       CT Head Without Contrast  Narrative: CT HEAD WITHOUT CONTRAST     HISTORY: Fall     COMPARISON: 01/13/2019     TECHNIQUE: Axial CT imaging was obtained through the brain. No IV  contrast was administered.     FINDINGS:  No acute intracranial hemorrhage is seen. There is diffuse atrophy.  There is periventricular and deep white matter microangiopathic change.  There is no midline shift or mass effect. Paranasal sinuses and mastoid  air cells appear clear. No calvarial fracture is seen.     Impression: No acute intracranial findings.     Radiation dose reduction techniques were utilized, including automated  exposure control and exposure modulation based on body size.     This report was finalized on 11/18/2021 12:17 AM by Dr. Aliza Mitchell M.D.     XR Hip With or Without  Pelvis 2 - 3 View Right  Narrative: AP VIEW THE PELVIS PLUS 2 VIEWS RIGHT HIP     HISTORY: Fall with right hip pain     COMPARISON: 10/08/2017     FINDINGS:  Patient has a right hip arthroplasty. It is able in position when  compared to prior exam from October 2017. There is deformity of the  right inferior pubic ramus, new when compared to prior exam. Appearance  is concerning for acute fracture. I cannot exclude a nondisplaced  fracture of the right superior pubic ramus. No acute fracture or  subluxation of the left hip is seen. There are degenerative changes of  both SI joints, as well as of the lumbosacral spine.     Impression: Right inferior pubic ramus fracture. Possible nondisplaced fracture of  the right superior pubic ramus.     This report was finalized on 11/18/2021 12:07 AM by Dr. Aliza Mitchell M.D.       I reviewed the patient's daily medications.  Scheduled Medications  allopurinol, 100 mg, Oral, Daily  digoxin, 125 mcg, Oral, Daily  insulin lispro, 0-7 Units, Subcutaneous, TID AC  melatonin, 5 mg, Oral, Nightly  metoprolol succinate XL, 25 mg, Oral, Daily  mirtazapine, 7.5 mg, Oral, Nightly  sacubitril-valsartan, 1 tablet, Oral, BID  senna-docusate sodium, 2 tablet, Oral, Daily  sodium chloride, 10 mL, Intravenous, Q12H    Infusions  sodium chloride, 75 mL/hr, Last Rate: 75 mL/hr (11/19/21 0521)    Diet  Diet Regular; Thin; Low Sodium, Cardiac, Consistent Carbohydrate; 1,500 mg Na       Assessment/Plan     Active Hospital Problems    Diagnosis  POA   • **Closed fracture of right inferior pubic ramus (HCC) [S32.591A]  Yes   • History of non-Hodgkin's lymphoma [Z85.72]  Not Applicable   • Dementia without behavioral disturbance (HCC) [F03.90]  Yes   • Type 2 diabetes mellitus, without long-term current use of insulin (HCC) [E11.9]  Yes   • Acute kidney injury (HCC) [N17.9]  Yes   • Essential hypertension [I10]  Yes   • Chronic combined systolic and diastolic heart failure (HCC) [I50.42]  Yes       Resolved Hospital Problems   No resolved problems to display.       89-year-old female with history of dementia and diabetes who was sent in from her facility after a fall which resulted in right pubic ramus fracture.  She also has MARV.     Right pubic ramus fracture:   -Ortho consulted, nonoperative management.    Weightbearing as tolerated, no Ortho follow-up needed.  Anticipate discharge to SNF    MARV:   - Creatinine already improved.  Continue fluids for now    Diabetes:   - Sliding scale insulin for now.    A1c 7.5    · SCDs for DVT prophylaxis.  · DNR/DNI  · Discussed with patient and family.  · Anticipate discharge to SNU facility tomorrow      Gilda Nuno [facility]  11/19/21  15:54 EST    Patient was wearing facemask when I entered the room and throughout our encounter.  I wore protective equipment throughout this patient encounter including a face mask, gloves and protective eyewear.  Hand hygiene was performed before donning protective equipment and after removal when leaving the room.

## 2021-11-19 NOTE — THERAPY TREATMENT NOTE
Patient Name: Jada Keith  : 4/10/1926    MRN: [id number]                              Today's Date: 2021       Admit Date: 2021    Visit Dx:     ICD-10-CM ICD-9-CM   1. Closed fracture of right inferior pubic ramus, initial encounter ([facility])  S32.591A 808.2   2. Fall at nursing home, initial encounter  W19.XXXA E888.9    Y92.129 E849.7   3. MARV (acute kidney injury) ([facility])  N17.9 584.9   4. Urinary retention  R33.9 788.20   5. Dementia without behavioral disturbance, unspecified dementia type ([facility])  F03.90 294.20   6. Type 2 diabetes mellitus without complication, without long-term current use of insulin ([facility])  E11.9 250.00   7. Congestive heart failure, unspecified HF chronicity, unspecified heart failure type ([facility])  I50.9 428.0   8. 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   • Chronic combined systolic and diastolic heart failure ([facility])   • Collapsed vertebra, not elsewhere classified, thoracic region, initial encounter for fracture ([facility])   • Diastolic dysfunction   • Diffuse non-Hodgkin's lymphoblastic lymphoma ([facility])   • Diverticulosis of intestine   • Uterine leiomyoma   • Gastroesophageal reflux disease   • Memory loss   • Moderate mitral regurgitation   • Obstructive sleep apnea syndrome   • Osteoporosis with pathological fracture   • Paroxysmal supraventricular tachycardia ([facility])   • Rectal disorder   • Sinus tachycardia   • Acute systolic (congestive) heart failure   • Acute pulmonary edema ([facility])   • Pedal edema   • Acute respiratory failure with hypoxemia ([facility])   • Pleural effusion on right   • Essential hypertension   • Acute kidney injury (HCC)   • Closed subcapital fracture of right femur ([facility])   • Leukocytosis   • Type 2 diabetes mellitus, without long-term current use of insulin ([facility])   • Hyponatremia   • DNR (do not resuscitate)   • Heel ulcer, right, with unspecified  severity (HCC)   • DM (diabetes mellitus) (HCC)   • Closed fracture of right inferior pubic ramus (HCC)   • History of non-Hodgkin's lymphoma   • Dementia without behavioral disturbance (HCC)     Past Medical History:   Diagnosis Date   • Arthritis    • CHF (congestive heart failure) (HCC)    • Coronary artery disease    • DM type 2 (diabetes mellitus, type 2), new diagnosis 10/8/2017   • GERD (gastroesophageal reflux disease)    • History of transfusion    • Hypertension    • Lymphoma (HCC)    • Status post chemotherapy      Past Surgical History:   Procedure Laterality Date   • ABDOMINAL SURGERY      tumor removals due to cancer   • APPENDECTOMY     • BACK SURGERY     • CARDIAC CATHETERIZATION     • CHOLECYSTECTOMY     • EYE SURGERY      cataract surgery   • FRACTURE SURGERY      right hip   • HYSTERECTOMY     • JOINT REPLACEMENT      right hip   • CA PARTIAL HIP REPLACEMENT Right 10/8/2017    Procedure: CEMENTED BIPOLAR;  Surgeon: Michael England MD;  Location: [facility];  Service: Orthopedics   • SKIN BIOPSY     • TONSILLECTOMY     • TUMOR REMOVAL        General Information     Row Name 11/19/21 1352          Physical Therapy Time and Intention    Document Type therapy note (daily note)  -DB     Mode of Treatment co-treatment; physical therapy; occupational therapy  -DB     Row Name 11/19/21 6564          General Information    Patient Profile Reviewed yes  -DB     Existing Precautions/Restrictions fall  -DB           User Key  (r) = Recorded By, (t) = Taken By, (c) = Cosigned By    Initials Name Provider Type    DB Kendra Lubin PT Physical Therapist               Mobility     Row Name 11/19/21 3816          Bed Mobility    Bed Mobility supine-sit; sit-supine; scooting/bridging  -DB     Scooting/Bridging [place] (Bed Mobility) verbal cues; 2 person assist; dependent (less than 25% patient effort)  -DB     Supine-Sit [place] (Bed Mobility) maximum assist (25% patient effort); 2 person assist;  verbal cues  -DB     Sit-Supine [place] (Bed Mobility) verbal cues; maximum assist (25% patient effort); 2 person assist  -DB     Assistive Device (Bed Mobility) bed rails; draw sheet; head of bed elevated  -DB     Row Name 11/19/21 1353          Sit-Stand Transfer    Sit-Stand [place] (Transfers) moderate assist (50% patient effort); verbal cues; nonverbal cues (demo/gesture); 2 person assist  -DB     Assistive Device (Sit-Stand Transfers) walker, front-wheeled  -DB     Row Name 11/19/21 1359          Gait/Stairs (Locomotion)    [place] Level (Gait) minimum assist (75% patient effort); 2 person assist; verbal cues  -DB     Assistive Device (Gait) walker, front-wheeled  -DB     Distance in Feet (Gait) 5'  -DB     Deviations/Abnormal Patterns (Gait) festinating/shuffling; gait speed decreased; antalgic; stride length decreased; weight shifting decreased; kaveh decreased  -DB     Bilateral Gait Deviations forward flexed posture; heel strike decreased  -DB           User Key  (r) = Recorded By, (t) = Taken By, (c) = Cosigned By    Initials Name Provider Type    Kendra Chan PT Physical Therapist               Obj/Interventions     Row Name 11/19/21 1359          Motor Skills    Therapeutic Exercise --  pt declines ther ex after ambulating, states she is too tired  -DB     Row Name 11/19/21 1351          Balance    Balance Assessment sitting static balance; sitting dynamic balance; standing static balance; standing dynamic balance  -DB     Static Sitting Balance WFL; sitting, edge of bed  -DB     Dynamic Sitting Balance mild impairment; sitting, edge of bed  -DB     Static Standing Balance moderate impairment; supported  -DB     Dynamic Standing Balance severe impairment; supported  -DB     Balance Interventions sitting; standing; sit to stand  -DB           User Key  (r) = Recorded By, (t) = Taken By, (c) = Cosigned By    Initials Name Provider Type    Kendra Chan PT Physical Therapist                Goals/Plan    No documentation.                Clinical Impression     Row Name 11/19/21 1356          Pain Scale: Numbers Pre/Post-Treatment    Pain Intervention(s) Ambulation/increased activity; Repositioned  -DB     Row Name 11/19/21 1356          Plan of Care Review    Plan of Care Reviewed With patient; daughter  -DB     Progress improving  -DB     Outcome Summary Pt seen today with OT for co-treat d/t pt needing heavy assist and to encourage participation. Coordinated session with RN to give medication for better participation. Pt alert upon entering room, agreeable to work with PT/OT. Pt was maxA x 2 for bed mob, instructed pt to focus on breathing, kept knees together while working on transfer to limit inc'd pain. Pt was able to perform STS with modA x 2 to RW and took a few steps EOB and fwd. Pt needs reassurance and cues for breathing during session, but overall participated well. Continues to benefit from skilled PT.  -DB     Row Name 11/19/21 1356          Vital Signs    O2 Delivery Pre Treatment room air  -DB     O2 Delivery Intra Treatment room air  -DB     O2 Delivery Post Treatment room air  -DB     Pre Patient Position Supine  -DB     Intra Patient Position Standing  -DB     Post Patient Position Supine  -DB     Row Name 11/19/21 1351          Positioning and Restraints    Pre-Treatment Position in bed  -DB     Post Treatment Position bed  -DB     In Bed supine; call light within reach; encouraged to call for assist; exit alarm on; with family/caregiver; heels elevated  -DB           User Key  (r) = Recorded By, (t) = Taken By, (c) = Cosigned By    Initials Name Provider Type    Kendra Chan PT Physical Therapist               Outcome Measures     Row Name 11/19/21 3595          How much help from another person do you currently need...    Turning from your back to your side while in flat bed without using bedrails? 2  -DB     Moving from lying on back to sitting on the side of  a flat bed without bedrails? 2  -DB     Moving to and from a bed to a chair (including a wheelchair)? 2  -DB     Standing up from a chair using your arms (e.g., wheelchair, bedside chair)? 2  -DB     Climbing 3-5 steps with a railing? 1  -DB     To walk in hospital room? 2  -DB     AM-PAC 6 Clicks Score (PT) 11  -DB     Row Name 11/19/21 1404 11/19/21 1345       Functional Assessment    Outcome Measure Options AM-PAC 6 Clicks Basic Mobility (PT)  -DB AM-PAC 6 Clicks Daily Activity (OT)  -LE          User Key  (r) = Recorded By, (t) = Taken By, (c) = Cosigned By    Initials Name Provider Type    Funmilayo Augustine OTR Occupational Therapist    Kendra Chan, PT Physical Therapist                             Physical Therapy Education                 Title: PT OT SLP Therapies (Done)     Topic: Physical Therapy (Done)     Point: Mobility training (Done)     Learning Progress Summary           Patient Acceptance, E, VU by DB at 11/19/2021 1405    Acceptance, E, NR by DB at 11/18/2021 1426   Family Acceptance, E, VU by DB at 11/18/2021 1425                   Point: Home exercise program (Done)     Learning Progress Summary           Patient Acceptance, E, VU by DB at 11/19/2021 1405    Acceptance, E, NR by DB at 11/18/2021 1426   Family Acceptance, E, VU by DB at 11/18/2021 1425                   Point: Body mechanics (Done)     Learning Progress Summary           Patient Acceptance, E, VU by DB at 11/19/2021 1405    Acceptance, E, NR by DB at 11/18/2021 1426   Family Acceptance, E, VU by DB at 11/18/2021 1425                   Point: Precautions (Done)     Learning Progress Summary           Patient Acceptance, E, VU by DB at 11/19/2021 1405    Acceptance, E, NR by DB at 11/18/2021 1426   Family Acceptance, E, VU by DB at 11/18/2021 1425                               User Key     Initials Effective Dates Name Provider Type Discipline    DB 06/16/21 -  Kendra Lubin, PT Physical Therapist PT              PT  Recommendation and Plan  Planned Therapy Interventions (PT): balance training, bed mobility training, gait training, home exercise program, postural re-education, patient/family education, neuromuscular re-education, stair training, strengthening, transfer training  Plan of Care Reviewed With: patient, daughter  Progress: improving  Outcome Summary: Pt seen today with OT for co-treat d/t pt needing heavy assist and to encourage participation. Coordinated session with RN to give medication for better participation. Pt alert upon entering room, agreeable to work with PT/OT. Pt was maxA x 2 for bed mob, instructed pt to focus on breathing, kept knees together while working on transfer to limit inc'd pain. Pt was able to perform STS with modA x 2 to RW and took a few steps EOB and fwd. Pt needs reassurance and cues for breathing during session, but overall participated well. Continues to benefit from skilled PT.     Time Calculation:    PT Charges     Row Name 11/19/21 1405             Time Calculation    Start Time 1305  -DB      Stop Time 1323  -DB      Time Calculation (min) 18 min  -DB      PT Received On 11/19/21  -DB      PT - Next Appointment 11/20/21  -DB              Time Calculation- PT    Total Timed Code Minutes- PT 18 minute(s)  -DB            User Key  (r) = Recorded By, (t) = Taken By, (c) = Cosigned By    Initials Name Provider Type    Kendra Chan PT Physical Therapist              Therapy Charges for Today     Code Description Service Date Service Provider Modifiers Qty    25415945178  PT EVAL MOD COMPLEXITY 2 11/18/2021 Kendra Lubin, PT GP 1    68342641780 HC PT THER PROC EA 15 MIN 11/18/2021 Kendra Lubin, PT GP 1    47272839205 HC PT THER PROC EA 15 MIN 11/19/2021 Kendra Lubin, PT GP 1          PT G-Codes  Outcome Measure Options: AM-PAC 6 Clicks Basic Mobility (PT)  AM-PAC 6 Clicks Score (PT): 11  AM-PAC 6 Clicks Score (OT): 8    Kendra Lubin PT  11/19/2021

## 2021-11-19 NOTE — PLAN OF CARE
Goal Outcome Evaluation:  Plan of Care Reviewed With: patient, daughter           Outcome Summary: Pt admit from [facility] with fall and pelvic fracture. Family reports pt up with walker on own and usually able to do ADL.  Pt seen twice today by OT with second session in cotreat with PT to move to sit and stand EOB.  Pt initially hollers with pain and fear but responds to cueing and reasurrance.  Dght present during sessions. Agree with plan for SNU at d/c.        Patient was placed in a face mask intermittently during this therapy encounter. Therapist used appropriate personal protective equipment including surgical mask, eye shield and gloves during the entire therapy session. Hand hygiene was completed before and after therapy session. Patient is not in enhanced droplet precautions.

## 2021-11-19 NOTE — DISCHARGE PLACEMENT REQUEST
Jada Keith (89 y.o. Female)             Date of Birth Social Security Number Address Home Phone MRN    04/10/1926  [address]  [place] [address] [phone] [id number]    Buddhist Marital Status             [facility]        Admission Date Admission Type Admitting Provider Attending Provider Department, Room/Bed    11/17/21 Emergency Gilda Nuno DO George, Katherine, DO [facility] [address] [place], P882/1    Discharge Date Discharge Disposition Discharge Destination                         Attending Provider: Gilda Nuno DO    Allergies: Sertraline, Risedronate Sodium, Sulfa Antibiotics    Isolation: None   Infection: None   Code Status: No CPR   Advance Care Planning Activity    Ht: --   Wt: --    Admission Cmt: None   Principal Problem: Closed fracture of right inferior pubic ramus (HCC) [S32.591A]                 Active Insurance as of 11/17/2021     Primary Coverage     Payor Plan Insurance Group Employer/Plan Group    MEDICARE MEDICARE A & B      Payor Plan Address Payor Plan Phone Number Payor Plan Fax Number Effective Dates    [address] [phone]  4/1/1991 - None Entered    [facility] [id number]       Subscriber Name Subscriber Birth Date Member ID       JADA KEITH 4/10/1926 [id number]           Secondary Coverage     Payor Plan Insurance Group Employer/Plan Group    [employer] SUP [employer]AR [employer] OPTIONS PLAN N     Payor Plan Address Payor Plan Phone Number Payor Plan Fax Number Effective Dates    [facility] [phone]  1/1/2013 - None Entered    [address]       [facility] [address]       Subscriber Name Subscriber Birth Date Member ID       JADA KEITH 4/10/1926 [id number]                 Emergency Contacts      (Rel.) Home Phone Work Phone Mobile Phone    Berkley Grullon (Daughter) [phone] -- [phone]

## 2021-11-19 NOTE — PLAN OF CARE
Goal Outcome Evaluation:  Plan of Care Reviewed With: patient, daughter        Progress: improving  Outcome Summary: Pt seen today with OT for co-treat d/t pt needing heavy assist and to encourage participation. Coordinated session with RN to give medication for better participation. Pt alert upon entering room, agreeable to work with PT/OT. Pt was maxA x 2 for bed mob, instructed pt to focus on breathing, kept knees together while working on transfer to limit inc'd pain. Pt was able to perform STS with modA x 2 to RW and took a few steps EOB and fwd. Pt needs reassurance and cues for breathing during session, but overall participated well. Continues to benefit from skilled PT.

## 2021-11-19 NOTE — CONSULTS
Orthopaedic Surgery  Consult Note  Dr. EBONIE Esquivel II  [phone]    HPI:  Patient is a 89 y.o. Not  or  female who presents with right hip pain after a fall at the nursing home.  Patient only oriented to self.  Complains of pain with movement but otherwise doing fine at rest.  Was admitted for pain control and therapy.  X-rays demonstrated pubic ramus fractures and no other acute abnormalities.  Pain is sharp with activity and nonexistent at rest    MEDICAL HISTORY  Past Medical History:   Diagnosis Date   • Arthritis    • CHF (congestive heart failure) (HCC)    • Coronary artery disease    • DM type 2 (diabetes mellitus, type 2), new diagnosis 10/8/2017   • GERD (gastroesophageal reflux disease)    • History of transfusion    • Hypertension    • Lymphoma (HCC)    • Status post chemotherapy    ·   Past Surgical History:   Procedure Laterality Date   • ABDOMINAL SURGERY      tumor removals due to cancer   • APPENDECTOMY     • BACK SURGERY     • CARDIAC CATHETERIZATION     • CHOLECYSTECTOMY     • EYE SURGERY      cataract surgery   • FRACTURE SURGERY      right hip   • HYSTERECTOMY     • JOINT REPLACEMENT      right hip   • [place] PARTIAL HIP REPLACEMENT Right 10/8/2017    Procedure: CEMENTED BIPOLAR;  Surgeon: Michael England MD;  Location: [facility];  Service: Orthopedics   • SKIN BIOPSY     • TONSILLECTOMY     • TUMOR REMOVAL     ·   Prior to Admission medications    Medication Sig Start Date End Date Taking? Authorizing Provider   acetaminophen (TYLENOL) 500 MG tablet Take 500 mg by mouth Daily.   Yes Charli Starks MD   allopurinol (ZYLOPRIM) 100 MG tablet Take 100 mg by mouth Daily.   Yes Charli Starks MD   calcium citrate-vitamin d (CITRACAL) 200-250 MG-UNIT tablet tablet Take 1 tablet by mouth Daily.   Yes Charli Starks MD   digoxin (LANOXIN) 125 MCG tablet Take 1 tablet by mouth Daily. Hold pulse less than 65 12/6/17  Yes Xander Apodaca MD   insulin aspart  (novoLOG) 100 UNIT/ML injection Inject 0-7 Units under the skin 4 (Four) Times a Day With Meals & at Bedtime. 12/6/17  Yes Xander Apodaca MD   insulin glargine (LANTUS, SEMGLEE) 100 UNIT/ML injection Inject 10 Units under the skin into the appropriate area as directed Every Night. 11/18/21  Yes Charli Starks MD   ivabradine HCl (CORLANOR) 5 MG tablet tablet Take 7.5 mg by mouth Daily With Breakfast & Dinner. 4/11/17  Yes Charli Starks MD   melatonin 5 MG tablet tablet Take 5 mg by mouth Every Night.   Yes Charli Starks MD   metoprolol succinate XL (TOPROL-XL) 100 MG 24 hr tablet Take 0.5 tablets by mouth Daily. Hold pulse less than 65 or SBP less than 100  Patient taking differently: Take 25 mg by mouth Daily. Hold pulse less than 65 or SBP less than 100 12/6/17  Yes Xander Apodaca MD   metoprolol tartrate (LOPRESSOR) 25 MG tablet Take 25 mg by mouth 2 (Two) Times a Day. Take 1/2 tab(12.5mg) po BID   Yes Charli Starks MD   mirtazapine (REMERON) 15 MG tablet Take 7.5 mg by mouth Every Night.   Yes Charli Starks MD   sacubitril-valsartan (ENTRESTO) 24-26 MG tablet Take 1 tablet by mouth 2 (Two) Times a Day.   Yes Charli Starks MD   torsemide (DEMADEX) 20 MG tablet Take 20 mg by mouth Daily.   Yes Charli Starks MD   muscle rub (Merrill-Loo) 10-15 % cream cream Apply  topically Every 1 (One) Hour As Needed (leg pain/cramps). 5/5/17   Son Wall MD   traMADol (ULTRAM) 50 MG tablet Take 50 mg by mouth Every 8 (Eight) Hours As Needed for Moderate Pain .    Charli Starks MD   ·   Allergies   Allergen Reactions   • Sertraline Hallucinations     Weakness   • Risedronate Sodium      Leg cramps   • Sulfa Antibiotics Nausea And Vomiting   ·   Most Recent Immunizations   Administered Date(s) Administered   • COVID-19 (PFIZER) 09/28/2021   ·   Social History     Tobacco Use   • Smoking status: Never Smoker   • Smokeless tobacco: Never Used   Substance Use  Topics   • Alcohol use: Yes     Comment: once a year   ·    Social History     Substance and Sexual Activity   Drug Use No   ·     VITALS: /64 (BP Location: Right arm, Patient Position: Lying)   Pulse 105   Temp 97.1 °F (36.2 °C) (Skin)   Resp 16   SpO2 92%  There is no height or weight on file to calculate BMI.    PHYSICAL EXAM:   · CONSTITUTIONAL: No acute distress  · LUNGS: Equal chest rise, no shortness of air  · CARDIOVASCULAR: palpable peripheral pulses  · SKIN: no skin lesions in the area examined  · LYMPH: no lymphadenopathy in the area examined  · EXTREMITY: Right Lower Extremity  · Tenderness to Palpation: No tenderness but she does have mild discomfort with logroll  · Gross Deformity: No Gross Deformity  · Pulses:  Brisk Capillary Refill  · Sensation: She does claim sensation to the foot  · Motor: She is able to wiggle her toes on command    RADIOLOGY REVIEW:   CT Head Without Contrast    Result Date: 11/18/2021  No acute intracranial findings.  Radiation dose reduction techniques were utilized, including automated exposure control and exposure modulation based on body size.  This report was finalized on 11/18/2021 12:17 AM by Dr. Aliza Mitchell M.D.      XR Hip With or Without Pelvis 2 - 3 View Right    Result Date: 11/18/2021  Right inferior pubic ramus fracture. Possible nondisplaced fracture of the right superior pubic ramus.  This report was finalized on 11/18/2021 12:07 AM by Dr. Aliza Mitchell M.D.        LABS:   Results for the past 24 hours:   Recent Results (from the past 24 hour(s))   POC Glucose Once    Collection Time: 11/18/21 11:30 AM    Specimen: Blood   Result Value Ref Range    Glucose 170 (H) 70 - 130 mg/dL   POC Glucose Once    Collection Time: 11/18/21  4:35 PM    Specimen: Blood   Result Value Ref Range    Glucose 184 (H) 70 - 130 mg/dL   POC Glucose Once    Collection Time: 11/18/21  9:02 PM    Specimen: Blood   Result Value Ref Range    Glucose 176 (H) 70 - 130  "mg/dL   POC Glucose Once    Collection Time: 11/19/21  6:06 AM    Specimen: Blood   Result Value Ref Range    Glucose 166 (H) 70 - 130 mg/dL       IMPRESSION:  Patient is a 89 y.o. Not  or  female with right pubic ramus fractures    PLAN:   · Admited to: Gilda Nuno DO   · Disposition: Okay for weightbearing as tolerated.  Okay for physical therapy.  No need for orthopedic intervention.  I think given the minimal nature of these fractures, there is no need for orthopedic follow-up, these will heal up just fine on their own    R \"Maxwell\" Alvin RAMOS MD  Orthopaedic Surgery  [facility]  [phone] - [place] Office  [phone] - [place] Office            "

## 2021-11-19 NOTE — CASE MANAGEMENT/SOCIAL WORK
Continued Stay Note  [facility]     Patient Name: Jada Keith  MRN: [id number]  Today's Date: 11/19/2021    Admit Date: 11/17/2021     Discharge Plan     Row Name 11/19/21 1515       Plan    Plan SNF -- Referrals Pending.    Patient/Family in Agreement with Plan yes    Plan Comments Spoke with the patient's daughter/Berkley regarding the patient's discharge plan. She plans for the patient to d/c to SNF. CMS Compare SNF List was reviewed and she requests [facility], [facility], [facility], [facility] & [facility]. Referrals sent in Epic. Spoke with Mable/Iban & Jessica/Alexander who will look into the patient's case. Spoke with [facility] who may have a SNF bed available this weekend at their [place] location. She will look into the patient's case and f/u with CCP.               Discharge Codes    No documentation.                     Rhea Santizo RN

## 2021-11-20 VITALS
HEART RATE: 114 BPM | SYSTOLIC BLOOD PRESSURE: 126 MMHG | TEMPERATURE: 97 F | RESPIRATION RATE: 18 BRPM | OXYGEN SATURATION: 96 % | DIASTOLIC BLOOD PRESSURE: 68 MMHG

## 2021-11-20 LAB
GLUCOSE BLDC GLUCOMTR-MCNC: 170 MG/DL (ref 70–130)
GLUCOSE BLDC GLUCOMTR-MCNC: 201 MG/DL (ref 70–130)

## 2021-11-20 PROCEDURE — 63710000001 INSULIN LISPRO (HUMAN) PER 5 UNITS: Performed by: STUDENT IN AN ORGANIZED HEALTH CARE EDUCATION/TRAINING PROGRAM

## 2021-11-20 PROCEDURE — G0378 HOSPITAL OBSERVATION PER HR: HCPCS

## 2021-11-20 PROCEDURE — 51798 US URINE CAPACITY MEASURE: CPT

## 2021-11-20 PROCEDURE — 82962 GLUCOSE BLOOD TEST: CPT

## 2021-11-20 RX ADMIN — SACUBITRIL AND VALSARTAN 1 TABLET: 24; 26 TABLET, FILM COATED ORAL at 08:45

## 2021-11-20 RX ADMIN — METOPROLOL SUCCINATE 25 MG: 25 TABLET, EXTENDED RELEASE ORAL at 08:45

## 2021-11-20 RX ADMIN — INSULIN LISPRO 2 UNITS: 100 INJECTION, SOLUTION INTRAVENOUS; SUBCUTANEOUS at 08:44

## 2021-11-20 RX ADMIN — SODIUM CHLORIDE, PRESERVATIVE FREE 10 ML: 5 INJECTION INTRAVENOUS at 08:44

## 2021-11-20 RX ADMIN — INSULIN LISPRO 3 UNITS: 100 INJECTION, SOLUTION INTRAVENOUS; SUBCUTANEOUS at 12:18

## 2021-11-20 RX ADMIN — ACETAMINOPHEN 650 MG: 325 TABLET, FILM COATED ORAL at 11:25

## 2021-11-20 RX ADMIN — DOCUSATE SODIUM 50MG AND SENNOSIDES 8.6MG 2 TABLET: 8.6; 5 TABLET, FILM COATED ORAL at 08:44

## 2021-11-20 RX ADMIN — ALLOPURINOL 100 MG: 100 TABLET ORAL at 08:45

## 2021-11-20 NOTE — PLAN OF CARE
Goal Outcome Evaluation:Pt VSS, afebrile, worked with therapy today, accuchecks ac/hs with s/s insulin, Bladder scan with IC >250, code status changed to DNR today, Tylenol prn pain, DC pending SNF facility.

## 2021-11-20 NOTE — DISCHARGE SUMMARY
Patient Name: Jada Keith  : 4/10/1926  MRN: [id number]    Date of Admission: 2021  Date of Discharge:  2021  Primary Care Physician: Angela Cantu APRN      Chief Complaint:   Fall      Discharge Diagnoses     Active Hospital Problems    Diagnosis  POA   • **Closed fracture of right inferior pubic ramus (HCC) [S32.591A]  Yes   • History of non-Hodgkin's lymphoma [Z85.72]  Not Applicable   • Dementia without behavioral disturbance (HCC) [F03.90]  Yes   • Type 2 diabetes mellitus, without long-term current use of insulin (HCC) [E11.9]  Yes   • Acute kidney injury (HCC) [N17.9]  Yes   • Essential hypertension [I10]  Yes   • Chronic combined systolic and diastolic heart failure (HCC) [I50.42]  Yes      Resolved Hospital Problems   No resolved problems to display.        Hospital Course     Ms. Keith is a 89-year-old female with history of dementia, heart failure and diabetes who was sent in from her facility after a fall which resulted in right pubic ramus fracture.  She also had MARV.      Right pubic ramus fracture: Ortho was consulted, who recommended nonoperative management.    She will be weightbearing as tolerated, and no Ortho follow-up is needed. Tylenol as needed for pain.     MARV: Due to poor PO intake. Creatinine improved back to baseline (1.0) with fluids.     Diabetes: Resume home lantus and sliding scale     Combined systolic and diastolic heart failure: resume home torsemide, entresto, metoprolol, ivabradine    Afib: Not on anticoagulation. Resume digoxin and metoprolol    Day of Discharge     Subjective:    Resting comfortably in bed, no new complaints and no events overnight    Review of Systems  Denies chest pain, fevers, abdominal pain or nausea  Physical Exam:  Temp:  [97 °F (36.1 °C)-97.8 °F (36.6 °C)] 97 °F (36.1 °C)  Heart Rate:  [102-116] 116  Resp:  [16-18] 18  BP: (110-128)/(60-73) 126/68  There is no height or weight on file to calculate BMI.  Physical  Exam  Constitutional:       General: She is not in acute distress.     Appearance: She is not diaphoretic.      Comments: Frail, chronically ill-appearing.  Lethargic   Cardiovascular:      Rate and Rhythm: Normal rate and regular rhythm.   Pulmonary:      Effort: Pulmonary effort is normal. No respiratory distress.      Breath sounds: No wheezing or rhonchi.   Abdominal:      Palpations: Abdomen is soft.      Tenderness: There is no abdominal tenderness. There is no guarding.   Skin:     General: Skin is warm and dry.         Consultants     Consult Orders (all) (From admission, onward)     Start     Ordered    11/18/21 0524  Inpatient Case Management  Consult  Once        Provider:  (Not yet assigned)    11/18/21 0524 11/18/21 0211  Inpatient Orthopedic Surgery Consult  Once        Specialty:  Orthopedic Surgery  Provider:  Jono Esquivel II, MD    11/18/21 0221 11/18/21 0055  [facility] (on-call MD unless specified) Details  Once        Specialty:  Hospitalist  Provider:  (Not yet assigned)    11/18/21 0055              Procedures     Imaging Results (All)     Procedure Component Value Units Date/Time    CT Head Without Contrast [[id number]] Collected: 11/18/21 0014     Updated: 11/18/21 0020    Narrative:      CT HEAD WITHOUT CONTRAST     HISTORY: Fall     COMPARISON: 01/13/2019     TECHNIQUE: Axial CT imaging was obtained through the brain. No IV  contrast was administered.     FINDINGS:  No acute intracranial hemorrhage is seen. There is diffuse atrophy.  There is periventricular and deep white matter microangiopathic change.  There is no midline shift or mass effect. Paranasal sinuses and mastoid  air cells appear clear. No calvarial fracture is seen.       Impression:      No acute intracranial findings.     Radiation dose reduction techniques were utilized, including automated  exposure control and exposure modulation based on body size.     This report was finalized on 11/18/2021  12:17 AM by Dr. Aliza Mitchell M.D.       XR Hip With or Without Pelvis 2 - 3 View Right [[id number]] Collected: 11/18/21 0004     Updated: 11/18/21 0010    Narrative:      AP VIEW THE PELVIS PLUS 2 VIEWS RIGHT HIP     HISTORY: Fall with right hip pain     COMPARISON: 10/08/2017     FINDINGS:  Patient has a right hip arthroplasty. It is able in position when  compared to prior exam from October 2017. There is deformity of the  right inferior pubic ramus, new when compared to prior exam. Appearance  is concerning for acute fracture. I cannot exclude a nondisplaced  fracture of the right superior pubic ramus. No acute fracture or  subluxation of the left hip is seen. There are degenerative changes of  both SI joints, as well as of the lumbosacral spine.       Impression:      Right inferior pubic ramus fracture. Possible nondisplaced fracture of  the right superior pubic ramus.     This report was finalized on 11/18/2021 12:07 AM by Dr. Aliza Mitchell M.D.             Pertinent Labs     Results from last 7 days   Lab Units 11/19/21  0646 11/18/21  0600 11/17/21  2307   WBC 10*3/mm3 9.91 12.65* 14.19*   HEMOGLOBIN g/dL 11.2* 12.5 12.9   PLATELETS 10*3/mm3 158 161 193     Results from last 7 days   Lab Units 11/19/21  0645 11/18/21  0600 11/17/21  2307   SODIUM mmol/L 140 140 138   POTASSIUM mmol/L 4.0 4.4 3.9   CHLORIDE mmol/L 104 100 99   CO2 mmol/L 26.5 28.6 26.4   BUN mg/dL 31* 35* 41*   CREATININE mg/dL 1.03* 1.17* 1.27*   GLUCOSE mg/dL 183* 214* 206*   CrCl cannot be calculated (Unknown ideal weight.).  Results from last 7 days   Lab Units 11/17/21  2307   ALBUMIN g/dL 4.20   BILIRUBIN mg/dL 0.2   ALK PHOS U/L 89   AST (SGOT) U/L 15   ALT (SGPT) U/L 10     Results from last 7 days   Lab Units 11/19/21  0645 11/18/21  0600 11/17/21  2307   CALCIUM mg/dL 8.7 9.3 9.6   ALBUMIN g/dL  --   --  4.20   MAGNESIUM mg/dL 2.4*  --  2.3   PHOSPHORUS mg/dL 3.0  --   --        Results from last 7 days   Lab Units  11/17/21  2307   DIGOXIN LVL ng/mL 0.80           Invalid input(s): LDLCALC        Test Results Pending at Discharge       Discharge Details        Discharge Medications      Continue These Medications      Instructions Start Date   acetaminophen 500 MG tablet  Commonly known as: TYLENOL   500 mg, Oral, Daily      allopurinol 100 MG tablet  Commonly known as: ZYLOPRIM   100 mg, Oral, Daily      calcium citrate-vitamin d 200-250 MG-UNIT tablet tablet  Commonly known as: CITRACAL   1 tablet, Oral, Daily      digoxin 125 MCG tablet  Commonly known as: LANOXIN   125 mcg, Oral, Daily Digoxin, Hold pulse less than 65      insulin aspart 100 UNIT/ML injection  Commonly known as: novoLOG   0-7 Units, Subcutaneous, 4 Times Daily With Meals & Nightly      insulin glargine 100 UNIT/ML injection  Commonly known as: LANTUS, SEMGLEE   10 Units, Subcutaneous, Nightly      ivabradine HCl 5 MG tablet tablet  Commonly known as: CORLANOR   7.5 mg, Oral, Daily With Breakfast & Dinner      melatonin 5 MG tablet tablet   5 mg, Oral, Nightly      metoprolol tartrate 25 MG tablet  Commonly known as: LOPRESSOR   25 mg, Oral, 2 Times Daily, Take 1/2 tab(12.5mg) po BID       mirtazapine 15 MG tablet  Commonly known as: REMERON   7.5 mg, Oral, Nightly      muscle rub 10-15 % cream cream   Topical, Every 1 Hour PRN      sacubitril-valsartan 24-26 MG tablet  Commonly known as: ENTRESTO   1 tablet, Oral, 2 Times Daily      torsemide 20 MG tablet  Commonly known as: DEMADEX   20 mg, Oral, Daily         Stop These Medications    metoprolol succinate  MG 24 hr tablet  Commonly known as: TOPROL-XL     traMADol 50 MG tablet  Commonly known as: ULTRAM            Allergies   Allergen Reactions   • Sertraline Hallucinations     Weakness   • Risedronate Sodium      Leg cramps   • Sulfa Antibiotics Nausea And Vomiting         Discharge Disposition:  Skilled Nursing Facility (DC - External)    Discharge Diet:  Diet Order   Procedures   • Diet Regular;  Thin; Low Sodium, Cardiac, Consistent Carbohydrate; 1,500 mg Na       Discharge Activity:   weightbearing as tolerated    CODE STATUS:    Code Status and Medical Interventions:   Ordered at: 11/19/21 0829     Medical Intervention Limits:    NO intubation (DNI)     Code Status (Patient has no pulse and is not breathing):    No CPR (Do Not Attempt to Resuscitate)     Medical Interventions (Patient has pulse or is breathing):    Limited Support       No future appointments.   Contact information for follow-up providers     Angela Cantu APRN. Schedule an appointment as soon as possible for a visit in 1 week(s).    Specialty: Family Medicine  Contact information:  [address] [facility] [address]  [phone]                   Contact information for after-discharge care     Destination     [facility] .    Service: Skilled Nursing  Contact information:  [address] [facility] [address]  [phone]                             Time Spent on Discharge:  Greater than 30 minutes      Gilda Nuno DO  [facility]  11/20/21  07:51 EST

## 2021-11-20 NOTE — CASE MANAGEMENT/SOCIAL WORK
"Physicians Statement of Medical Necessity for  Ambulance Transportation    GENERAL INFORMATION     Name: Jada Keith  YOB: 1926  Medicare #: [id number]  Transport Date: 11/20/2021 (Valid for round trips this date, or for scheduled repetitive trips for 60 days from the date signed below.)  Origin: [facility] [address] [place] [address]  Destination: [facility] [address] [place] [address]  Is the Patient's stay covered under Medicare Part A (PPS/DRG?)Yes  Closest appropriate facility? Yes  If this a hosp-hosp transfer? No  Is this a hospice patient? No    MEDICAL NECESSITY QUESTIONAIRE    Ambulance Transportation is medically necessary only if other means of transportation are contraindicated or would be potentially harmful to the patient.  To meet this requirement, the patient must be either \"bed confined\" or suffer from a condition such that transport by means other than an ambulance is contraindicated by the patient's condition.  The following questions must be answered by the healthcare professional signing below for this form to be valid:     1) Describe the MEDICAL CONDITION (physical and/or mental) of this patient AT THE TIME OF AMBULANCE TRANSPORT that requires the patient to be transported in an ambulance, and why transport by other means is contraindicated by the patient's condition: Closed fracture of right inferior pubic ramus, dementia without behavioral disturbance, chronic combined systolic and diastolic heart failure   Past Medical History:   Diagnosis Date   • Arthritis    • CHF (congestive heart failure) (HCC)    • Coronary artery disease    • DM type 2 (diabetes mellitus, type 2), new diagnosis 10/8/2017   • GERD (gastroesophageal reflux disease)    • History of transfusion    • Hypertension    • Lymphoma (HCC)    • Status post chemotherapy       Past Surgical History:   Procedure Laterality Date   • ABDOMINAL SURGERY      tumor removals due " "to cancer   • APPENDECTOMY     • BACK SURGERY     • CARDIAC CATHETERIZATION     • CHOLECYSTECTOMY     • EYE SURGERY      cataract surgery   • FRACTURE SURGERY      right hip   • HYSTERECTOMY     • JOINT REPLACEMENT      right hip   • [place] PARTIAL HIP REPLACEMENT Right 10/8/2017    Procedure: CEMENTED BIPOLAR;  Surgeon: Michael England MD;  Location: [facility];  Service: Orthopedics   • SKIN BIOPSY     • TONSILLECTOMY     • TUMOR REMOVAL        2) Is this patient \"bed confined\" as defined below?No    To be \"bed confined\" the patient must satisfy all three of the following criteria:  (1) unable to get up from bed without assistance; AND (2) unable to ambulate;  AND (3) unable to sit in a chair or wheelchair.  3) Can this patient safely be transported by car or wheelchair van (I.e., may safely sit during transport, without an attendant or monitoring?)No   4. In addition to completing questions 1-3 above, please check any of the following conditions that apply*:          *Note: supporting documentation for any boxes checked must be maintained in the patient's medical records Non-healed fractures, Patient is confused, Medical attendant required and Other patient is max assist x2, falls risk      SIGNATURE OF PHYSICIAN OR OTHER AUTHORIZED HEALTHCARE PROFESSIONAL    I certify that the above information is true and correct based on my evaluation of this patient, and represent that the patient requires transport by ambulance and that other forms of transport are contraindicated.  I understand that this information will be used by the Centers for Medicare and Medicaid Services (CMS) to support the determiniation of medical necessity for ambulance services, and I represent that I have personal knowledge of the patient's condition at the time of transport.    x   If this box is checked, I also certify that the patient is physically or mentally incapable of signing the ambulance service's claim form and that the institution " with which I am affiliated has furnished care, services or assistance to the patient.  My signature below is made on behalf of the patient pursuant to 42 .36(b)(4). In accordance with 42 .37, the specific reason(s) that the patient is physically or mentally incapable of signing the claim for is as follows: patient has dementia    Signature of Physician or Healthcare Professional  Date/Time:   11/20/2021 10:20 EST       (For Scheduled repetitive transport, this form is not valid for transports performed more than 60 days after this date).                                                                                                                                            --------------------------------------------------------------------------------------------  Printed Name and Credentials of Physician or Authorized Healthcare Professional     *Form must be signed by patient's attending physician for scheduled, repetitive transports,.  For non-repetitive ambulance transports, if unable to obtain the signature of the attending physician, any of the following may sign (please select below):     Physician  Clinical Nurse Specialist  Registered Nurse     Physician Assistant  Discharge Planner  Licensed Practical Nurse     Nurse Practitioner           Rachelle Mcconnell  (RN)  2018 03:48:04

## 2021-11-20 NOTE — PLAN OF CARE
Goal Outcome Evaluation:  Plan of Care Reviewed With: patient        Progress: improving  Outcome Summary: VSS, NVI, VOIDED ONCE THIS SHIFT PER CARTER, WILL BLADDERSCAN AND IN AND OUT CATH IF NEEDED, PLAN TO DC TO SNF TODAY, ORIENTED TO SELF ONLY AT BASELINE, BM THIS SHIFT, UNABLE TO EDUCATE D/T IMPAIRED COGNITION

## 2021-11-20 NOTE — CASE MANAGEMENT/SOCIAL WORK
Continued Stay Note  Psychiatric     Patient Name: Jada Keith  MRN: [id number]  Today's Date: 11/20/2021    Admit Date: 11/17/2021     Discharge Plan     Row Name 11/20/21 1101       Plan    Plan [facility] via [facility] at 3 pm    Patient/Family in Agreement with Plan yes    Plan Comments CCP notified by RN patient has discharge orders and will need EMS transport to [facility]. Noted that [facility] stated bed will have bed this weekend per Rhea Santizo's CCP note from 11/19. Requested RN contact CCP if any issues arise with bed status after calling report. [facility] scheduled transport for 3 pm. RN notified of transport time and agreeable to notify patient's daughter Berkley of arrangements. Funmilayo/RN confirms she has packet and is able to print ambulance necessity form and declines additional CCP assistance. Krystal Barron RN/CCP    Final Discharge Disposition Code 03 - skilled nursing facility (SNF)    [facility] at 3 pm               Discharge Codes    No documentation.               Expected Discharge Date and Time     Expected Discharge Date Expected Discharge Time    Nov 20, 2021             [facility] Barron

## 2024-06-01 NOTE — CASE MANAGEMENT/SOCIAL WORK
Case Management Discharge Note      Final Note: Violette Rodriguez [facility].    Provided Post Acute Provider List?: Yes  Post Acute Provider List: Home Health, Nursing Home  Delivered To: Support Person  Method of Delivery: In person    Selected Continued Care - Discharged on 11/20/2021 Admission date: 11/17/2021 - Discharge disposition: Skilled Nursing Facility (DC - External)    Destination Coordination complete.    Service Provider Selected Services Address Phone Fax Patient Preferred    [facility]  Skilled Nursing [address] [place], [place] [address] [phone] --          Durable Medical Equipment    No services have been selected for the patient.              Dialysis/Infusion    No services have been selected for the patient.              Home Medical Care    No services have been selected for the patient.              Therapy    No services have been selected for the patient.              Community Resources    No services have been selected for the patient.              Community & DME    No services have been selected for the patient.                       Final Discharge Disposition Code: 03 - skilled nursing facility (SNF)   No

## 2025-02-22 NOTE — ED PROVIDER NOTES
EMERGENCY DEPARTMENT ENCOUNTER    Room Number:  26/26  Date seen:  1/13/2019  Time seen: 6:49 PM  PCP: System, Provider Not In    HPI:  Chief complaint:Fall   History limited by: dementia  HX given by: pt's daughter   Context:Jada Keith is a 89 y.o. female who [facility] ED [facility] due to unwitnessed mechanical fall PTA at ED. Pt confirms hitting head upon fall but is unsure of LOC. Pt denies neck pain, any extremity pain, chest pain, back pain, HA, any anticoagulation, and states she does not remember falling. Per daughter, pt is at neurological baseline and usually ambulates with walker at baseline.     Timing:constant   Duration: unspecified, PTA at ED   Location:head   Radiation:none   Quality:hitting head   Intensity/Severity:mild   Associated Symptoms:none   Aggravating Factors:fall   Alleviating Factors:none   Previous Episodes:none   Treatment before arrival:none     MEDICAL RECORDS REVIEW  Pt has hx of unsteadiness on feet per NH paperwork.     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 04/30/2017   • Chronic combined systolic and diastolic heart failure (CMS/HCC) 04/30/2017   • Collapsed vertebra, not elsewhere classified, thoracic region, initial encounter for fracture (CMS/HCC) 04/30/2017   • Diastolic dysfunction 04/30/2017   • Diffuse non-Hodgkin's lymphoblastic lymphoma (CMS/HCC) 04/30/2017   • Diverticulosis of intestine 04/30/2017   • Uterine leiomyoma 04/30/2017   • Gastroesophageal reflux disease 06/01/1994   • Memory loss 04/30/2017   • Moderate mitral regurgitation 04/30/2017   • Obstructive sleep apnea syndrome 04/30/2017   • Osteoporosis with pathological fracture 04/30/2017   • Paroxysmal supraventricular tachycardia (CMS/HCC) 04/30/2017   • Rectal disorder 04/30/2017   • Sinus  Goal Outcome Evaluation:  Plan of Care Reviewed With: patient        Progress: no change  Outcome Evaluation: A & O x 4, VSS, c/o R hip pain, prn pain meds given with relief, voiding per simmons catheter, IVF infusing, plan surgery at 8 am, safety maintained                              tachycardia 10/01/2015   • Acute systolic (congestive) heart failure 04/30/2017   • Acute pulmonary edema (CMS/[facility]) 04/30/2017   • Pedal edema 04/30/2017   • Acute respiratory failure with hypoxemia (CMS/[facility]) 04/30/2017   • Pleural effusion on right 04/30/2017   • Essential hypertension 04/30/2017   • Acute kidney injury (CMS/[facility]) 10/07/2017   • Closed subcapital fracture of right femur (CMS/[facility]) 10/07/2017   • Leukocytosis 10/07/2017   • DM type 2 (diabetes mellitus, type 2), new diagnosis 10/08/2017   • Hyponatremia 10/09/2017   • DNR (do not resuscitate) 10/12/2017   • Heel ulcer, right, with unspecified severity (CMS/[facility]) 12/02/2017   • DM (diabetes mellitus) (CMS/[facility]) 12/02/2017     Resolved Ambulatory Problems     Diagnosis Date Noted   • No Resolved Ambulatory Problems     Past Medical History:   Diagnosis Date   • Arthritis    • CHF (congestive heart failure) (CMS/[facility])    • Coronary artery disease    • DM type 2 (diabetes mellitus, type 2), new diagnosis 10/8/2017   • GERD (gastroesophageal reflux disease)    • History of transfusion    • Hypertension    • Lymphoma (CMS/[facility])    • Status post chemotherapy        PAST SURGICAL HISTORY  Past Surgical History:   Procedure Laterality Date   • ABDOMINAL SURGERY      tumor removals due to cancer   • APPENDECTOMY     • BACK SURGERY     • CARDIAC CATHETERIZATION     • CHOLECYSTECTOMY     • EYE SURGERY      cataract surgery   • FRACTURE SURGERY      right hip   • HYSTERECTOMY     • JOINT REPLACEMENT      right hip   • PA PARTIAL HIP REPLACEMENT Right 10/8/2017    Procedure: CEMENTED BIPOLAR;  Surgeon: Michael England MD;  Location: [facility];  Service: Orthopedics   • SKIN BIOPSY     • TONSILLECTOMY     • TUMOR REMOVAL         FAMILY HISTORY  Family History   Family history unknown: Yes       SOCIAL HISTORY  Social History     Socioeconomic History   • Marital status:      Spouse name: Not on file   • Number of children: Not on file   • Years of  education: Not on file   • Highest education level: Not on file   Social Needs   • Financial resource strain: Not on file   • Food insecurity - worry: Not on file   • Food insecurity - inability: Not on file   • Transportation needs - medical: Not on file   • Transportation needs - non-medical: Not on file   Occupational History   • Not on file   Tobacco Use   • Smoking status: Never Smoker   • Smokeless tobacco: Never Used   Substance and Sexual Activity   • Alcohol use: Yes     Comment: once a year   • Drug use: No   • Sexual activity: Defer   Other Topics Concern   • Not on file   Social History Narrative    Exercises at [employer]       REVIEW OF SYSTEMS  Review of Systems   Unable to perform ROS: Dementia   Constitutional: Positive for activity change (hitting head on floor).   Cardiovascular: Negative for chest pain.   Musculoskeletal: Negative for arthralgias, back pain, myalgias and neck pain.   Neurological: Negative for headaches.       PHYSICAL EXAM  ED Triage Vitals [01/13/19 1834]   Temp Heart Rate Resp BP SpO2   97.5 °F (36.4 °C) 105 16 126/66 97 %      Temp src Heart Rate Source Patient Position BP Location FiO2 (%)   Tympanic Monitor -- -- --     Physical Exam   Constitutional: She is well-developed, well-nourished, and in no distress. She does not have a sickly appearance. No distress.   HENT:   Head: Normocephalic and atraumatic.   Mouth/Throat: Uvula is midline, oropharynx is clear and moist and mucous membranes are normal.   Eyes: Pupils are equal, round, and reactive to light.   Neck: Normal range of motion and full passive range of motion without pain. Neck supple.   Cardiovascular: Normal rate, regular rhythm, S1 normal, S2 normal and normal heart sounds. Exam reveals no gallop and no friction rub.   No murmur heard.  Pulmonary/Chest: Effort normal and breath sounds normal. She has no decreased breath sounds. She has no wheezes. She has no rhonchi. She has no rales.   Abdominal: Soft. Normal  appearance and bowel sounds are normal. There is no tenderness. There is no rebound and no guarding.   Musculoskeletal: Normal range of motion.   Pt has FROM of all extremities, no wrist deformity, elbows have normal flexion, and no hip pain on palpation.    Neurological: She is alert. GCS score is 15.   Pt pleasantly demented. No focal deficits.    Skin: Skin is warm, dry and intact.   Psychiatric: Affect and judgment normal.   Nursing note and vitals reviewed.    Procedures    RADIOLOGY  CT Head Without Contrast   Final Result   No acute intracranial hemorrhage identified.       Radiation dose reduction techniques were utilized, including automated   exposure control and exposure modulation based on body size.       This report was finalized on 1/13/2019 8:11 PM by Dr. Aliza Mitchell M.D.            Reviewed. Independently viewed by me. Interpreted by radiologist.     COURSE & MEDICAL DECISION MAKING  Pertinent Labs and Imaging studies that were ordered and reviewed are noted above.  Results were reviewed/discussed with the patient and they were also made aware of online access.  Pt also made aware that some labs, such as cultures, will not be resulted during ER visit and follow up with PMD is necessary.     PROGRESS AND CONSULTS    Progress Notes:         1855: Upon pt exam, discussed possibility of CT head due to pt's fall, but probable lack of necessity due to pt's lack of complaints and neuro baseline. Informed pt and family of plan to ambulate pt prior to disposition.     1900: Reviewed pt's history and workup with Dr. Sena.  After a bedside evaluation, Dr. Sena agrees with the plan of care.Dr. Sena requested CT head, and I agreed with plan.     1909: CT head ordered for hemorrhage rule out.    2015: Pt rechecked and resting comfortably. Discussed with pt and family the negative acute findings of CT head and plan for discharge into NH care. Pt and family understand and agree with plan, all questions  "addressed.     The patient's history, physical exam, and lab findings were discussed with the physician, who also performed a face to face history and physical exam.  I discussed all results and noted any abnormalities with patient.  Discussed absoute need to recheck abnormalities with their family physician.  I answered any of the patient's questions.  Discussed plan for discharge, as there is no emergent indication for admission.  Pt is agreeable and understands need for follow up and repeat testing.  Pt is aware that discharge does not mean that nothing is wrong but it indicates no emergency is present and they must continue care with their family physician.  Pt is discharged with instructions to follow up with primary care doctor to have their blood pressure rechecked.          Disposition vitals:  /66   Pulse 105   Temp 97.5 °F (36.4 °C) (Tympanic)   Resp 16   Ht 152.4 cm (60\")   Wt 53.1 kg (117 lb)   SpO2 97%   BMI 22.85 kg/m²       DIAGNOSIS  Final diagnoses:   Fall at nursing home, initial encounter   Contusion of head, unspecified part of head, initial encounter       FOLLOW UP   Alicia Leger MD  [facility] [facility] [address] [phone]    Schedule an appointment as soon as possible for a visit         RX     Medication List      Changed    enalapril 2.5 MG tablet  Commonly known as:  VASOTEC  Take 8 tablets by mouth Daily.  What changed:  how much to take     furosemide 40 MG tablet  Commonly known as:  LASIX  Take 0.5 tablets by mouth Daily. Hold SBP less than 100  What changed:    how much to take  when to take this  additional instructions     metoprolol succinate  MG 24 hr tablet  Commonly known as:  TOPROL-XL  Take 0.5 tablets by mouth Daily. Hold pulse less than 65 or SBP less than   100  What changed:    how much to take  additional instructions          Documentation assistance provided by vanessa Sanders for NIKI Ames.  Information recorded " by the scribe was done at my direction and has been verified and validated by me.  Electronically signed by Patricia Sanders on 1/13/2019 at time 8:17 PM             Patricia Sanders  01/13/19 2017       Meche Covarrubias, NIKI  01/13/19 2043

## (undated) DEVICE — SUT VIC 2/0 CP1 27IN J266H

## (undated) DEVICE — SKIN PREP TRAY W/CHG: Brand: MEDLINE INDUSTRIES, INC.

## (undated) DEVICE — DRSNG GZ CURAD XEROFORM NONADHS 5X9IN STRL

## (undated) DEVICE — GLV SURG BIOGEL LTX PF 8

## (undated) DEVICE — TP CLTH MEDIPORE SFT 1IN 10YD

## (undated) DEVICE — OCCLUSIVE GAUZE STRIP,3% BISMUTH TRIBROMOPHENATE IN PETROLATUM BLEND: Brand: XEROFORM

## (undated) DEVICE — HANDPIECE SET WITH COAXIAL HIGH FLOW TIP AND SUCTION TUBE: Brand: INTERPULSE

## (undated) DEVICE — GLV SURG TRIUMPH CLASSIC PF LTX 8 STRL

## (undated) DEVICE — ENCORE® LATEX ORTHO SIZE 8.5, STERILE LATEX POWDER-FREE SURGICAL GLOVE: Brand: ENCORE

## (undated) DEVICE — PK HIP TOTL 40

## (undated) DEVICE — SUT VIC 0 0S8 27IN J698H

## (undated) DEVICE — SPNG GZ WOVN 4X4IN 12PLY 10/BX STRL

## (undated) DEVICE — SUT ETHIB 0 CT1 CR8 18IN CX21D

## (undated) DEVICE — PAD,ABDOMINAL,8"X10",ST,LF: Brand: MEDLINE